# Patient Record
Sex: FEMALE | Race: BLACK OR AFRICAN AMERICAN | NOT HISPANIC OR LATINO | URBAN - METROPOLITAN AREA
[De-identification: names, ages, dates, MRNs, and addresses within clinical notes are randomized per-mention and may not be internally consistent; named-entity substitution may affect disease eponyms.]

---

## 2017-05-04 ENCOUNTER — INPATIENT (INPATIENT)
Facility: HOSPITAL | Age: 77
LOS: 1 days | Discharge: HOME | End: 2017-05-06
Attending: INTERNAL MEDICINE

## 2017-05-04 DIAGNOSIS — Z98.890 OTHER SPECIFIED POSTPROCEDURAL STATES: Chronic | ICD-10-CM

## 2017-06-28 DIAGNOSIS — E78.5 HYPERLIPIDEMIA, UNSPECIFIED: ICD-10-CM

## 2017-06-28 DIAGNOSIS — R07.89 OTHER CHEST PAIN: ICD-10-CM

## 2017-06-28 DIAGNOSIS — F32.9 MAJOR DEPRESSIVE DISORDER, SINGLE EPISODE, UNSPECIFIED: ICD-10-CM

## 2017-06-28 DIAGNOSIS — I10 ESSENTIAL (PRIMARY) HYPERTENSION: ICD-10-CM

## 2017-06-28 DIAGNOSIS — D64.9 ANEMIA, UNSPECIFIED: ICD-10-CM

## 2017-06-28 DIAGNOSIS — G47.00 INSOMNIA, UNSPECIFIED: ICD-10-CM

## 2017-06-28 DIAGNOSIS — I25.10 ATHEROSCLEROTIC HEART DISEASE OF NATIVE CORONARY ARTERY WITHOUT ANGINA PECTORIS: ICD-10-CM

## 2017-06-28 DIAGNOSIS — M17.0 BILATERAL PRIMARY OSTEOARTHRITIS OF KNEE: ICD-10-CM

## 2017-06-28 DIAGNOSIS — K21.9 GASTRO-ESOPHAGEAL REFLUX DISEASE WITHOUT ESOPHAGITIS: ICD-10-CM

## 2017-06-28 DIAGNOSIS — Z87.891 PERSONAL HISTORY OF NICOTINE DEPENDENCE: ICD-10-CM

## 2017-06-28 DIAGNOSIS — Z95.5 PRESENCE OF CORONARY ANGIOPLASTY IMPLANT AND GRAFT: ICD-10-CM

## 2017-06-28 DIAGNOSIS — R07.9 CHEST PAIN, UNSPECIFIED: ICD-10-CM

## 2017-06-28 DIAGNOSIS — M19.90 UNSPECIFIED OSTEOARTHRITIS, UNSPECIFIED SITE: ICD-10-CM

## 2018-08-27 PROBLEM — Z00.00 ENCOUNTER FOR PREVENTIVE HEALTH EXAMINATION: Status: ACTIVE | Noted: 2018-08-27

## 2018-10-19 ENCOUNTER — APPOINTMENT (OUTPATIENT)
Dept: PULMONOLOGY | Facility: CLINIC | Age: 78
End: 2018-10-19

## 2019-01-10 ENCOUNTER — RECORD ABSTRACTING (OUTPATIENT)
Age: 79
End: 2019-01-10

## 2019-01-10 DIAGNOSIS — M25.561 PAIN IN RIGHT KNEE: ICD-10-CM

## 2019-01-10 DIAGNOSIS — Z87.898 PERSONAL HISTORY OF OTHER SPECIFIED CONDITIONS: ICD-10-CM

## 2019-01-10 DIAGNOSIS — M25.476 EFFUSION, UNSPECIFIED ANKLE: ICD-10-CM

## 2019-01-10 DIAGNOSIS — Z82.61 FAMILY HISTORY OF ARTHRITIS: ICD-10-CM

## 2019-01-10 DIAGNOSIS — M25.562 PAIN IN RIGHT KNEE: ICD-10-CM

## 2019-01-10 DIAGNOSIS — I25.10 ATHEROSCLEROTIC HEART DISEASE OF NATIVE CORONARY ARTERY W/OUT ANGINA PECTORIS: ICD-10-CM

## 2019-01-10 DIAGNOSIS — Z82.49 FAMILY HISTORY OF ISCHEMIC HEART DISEASE AND OTHER DISEASES OF THE CIRCULATORY SYSTEM: ICD-10-CM

## 2019-01-10 DIAGNOSIS — F17.200 NICOTINE DEPENDENCE, UNSPECIFIED, UNCOMPLICATED: ICD-10-CM

## 2019-01-10 DIAGNOSIS — R20.2 ANESTHESIA OF SKIN: ICD-10-CM

## 2019-01-10 DIAGNOSIS — M17.10 UNILATERAL PRIMARY OSTEOARTHRITIS, UNSPECIFIED KNEE: ICD-10-CM

## 2019-01-10 DIAGNOSIS — M19.90 UNSPECIFIED OSTEOARTHRITIS, UNSPECIFIED SITE: ICD-10-CM

## 2019-01-10 DIAGNOSIS — I10 ESSENTIAL (PRIMARY) HYPERTENSION: ICD-10-CM

## 2019-01-10 DIAGNOSIS — R20.0 ANESTHESIA OF SKIN: ICD-10-CM

## 2019-01-10 DIAGNOSIS — M25.473 EFFUSION, UNSPECIFIED ANKLE: ICD-10-CM

## 2019-01-10 RX ORDER — PANTOPRAZOLE 40 MG/1
TABLET, DELAYED RELEASE ORAL
Refills: 0 | Status: ACTIVE | COMMUNITY

## 2019-01-10 RX ORDER — ATORVASTATIN CALCIUM 80 MG/1
TABLET, FILM COATED ORAL
Refills: 0 | Status: ACTIVE | COMMUNITY

## 2019-01-10 RX ORDER — ASPIRIN 81 MG
81 TABLET, DELAYED RELEASE (ENTERIC COATED) ORAL
Refills: 0 | Status: ACTIVE | COMMUNITY

## 2019-03-08 ENCOUNTER — APPOINTMENT (OUTPATIENT)
Dept: ORTHOPEDIC SURGERY | Facility: CLINIC | Age: 79
End: 2019-03-08

## 2019-08-22 ENCOUNTER — INPATIENT (INPATIENT)
Facility: HOSPITAL | Age: 79
LOS: 13 days | Discharge: HOME | End: 2019-09-05
Attending: INTERNAL MEDICINE | Admitting: INTERNAL MEDICINE
Payer: MEDICARE

## 2019-08-22 VITALS
TEMPERATURE: 98 F | SYSTOLIC BLOOD PRESSURE: 178 MMHG | DIASTOLIC BLOOD PRESSURE: 89 MMHG | RESPIRATION RATE: 18 BRPM | OXYGEN SATURATION: 97 % | HEART RATE: 65 BPM | WEIGHT: 188.94 LBS

## 2019-08-22 DIAGNOSIS — Z98.890 OTHER SPECIFIED POSTPROCEDURAL STATES: Chronic | ICD-10-CM

## 2019-08-22 LAB
ALBUMIN SERPL ELPH-MCNC: 4 G/DL — SIGNIFICANT CHANGE UP (ref 3.5–5.2)
ALP SERPL-CCNC: 124 U/L — HIGH (ref 30–115)
ALT FLD-CCNC: 10 U/L — SIGNIFICANT CHANGE UP (ref 0–41)
ANION GAP SERPL CALC-SCNC: 11 MMOL/L — SIGNIFICANT CHANGE UP (ref 7–14)
AST SERPL-CCNC: 17 U/L — SIGNIFICANT CHANGE UP (ref 0–41)
BASOPHILS # BLD AUTO: 0.02 K/UL — SIGNIFICANT CHANGE UP (ref 0–0.2)
BASOPHILS NFR BLD AUTO: 0.3 % — SIGNIFICANT CHANGE UP (ref 0–1)
BILIRUB SERPL-MCNC: 0.3 MG/DL — SIGNIFICANT CHANGE UP (ref 0.2–1.2)
BUN SERPL-MCNC: 9 MG/DL — LOW (ref 10–20)
CALCIUM SERPL-MCNC: 9.4 MG/DL — SIGNIFICANT CHANGE UP (ref 8.5–10.1)
CHLORIDE SERPL-SCNC: 104 MMOL/L — SIGNIFICANT CHANGE UP (ref 98–110)
CO2 SERPL-SCNC: 27 MMOL/L — SIGNIFICANT CHANGE UP (ref 17–32)
CREAT SERPL-MCNC: 0.7 MG/DL — SIGNIFICANT CHANGE UP (ref 0.7–1.5)
EOSINOPHIL # BLD AUTO: 0.18 K/UL — SIGNIFICANT CHANGE UP (ref 0–0.7)
EOSINOPHIL NFR BLD AUTO: 2.8 % — SIGNIFICANT CHANGE UP (ref 0–8)
GLUCOSE SERPL-MCNC: 95 MG/DL — SIGNIFICANT CHANGE UP (ref 70–99)
HCT VFR BLD CALC: 36.6 % — LOW (ref 37–47)
HGB BLD-MCNC: 12.4 G/DL — SIGNIFICANT CHANGE UP (ref 12–16)
IMM GRANULOCYTES NFR BLD AUTO: 0.2 % — SIGNIFICANT CHANGE UP (ref 0.1–0.3)
LYMPHOCYTES # BLD AUTO: 1.71 K/UL — SIGNIFICANT CHANGE UP (ref 1.2–3.4)
LYMPHOCYTES # BLD AUTO: 26.7 % — SIGNIFICANT CHANGE UP (ref 20.5–51.1)
MCHC RBC-ENTMCNC: 31.4 PG — HIGH (ref 27–31)
MCHC RBC-ENTMCNC: 33.9 G/DL — SIGNIFICANT CHANGE UP (ref 32–37)
MCV RBC AUTO: 92.7 FL — SIGNIFICANT CHANGE UP (ref 81–99)
MONOCYTES # BLD AUTO: 0.6 K/UL — SIGNIFICANT CHANGE UP (ref 0.1–0.6)
MONOCYTES NFR BLD AUTO: 9.4 % — HIGH (ref 1.7–9.3)
NEUTROPHILS # BLD AUTO: 3.89 K/UL — SIGNIFICANT CHANGE UP (ref 1.4–6.5)
NEUTROPHILS NFR BLD AUTO: 60.6 % — SIGNIFICANT CHANGE UP (ref 42.2–75.2)
NRBC # BLD: 0 /100 WBCS — SIGNIFICANT CHANGE UP (ref 0–0)
PLATELET # BLD AUTO: 276 K/UL — SIGNIFICANT CHANGE UP (ref 130–400)
POTASSIUM SERPL-MCNC: 4.3 MMOL/L — SIGNIFICANT CHANGE UP (ref 3.5–5)
POTASSIUM SERPL-SCNC: 4.3 MMOL/L — SIGNIFICANT CHANGE UP (ref 3.5–5)
PROT SERPL-MCNC: 7.5 G/DL — SIGNIFICANT CHANGE UP (ref 6–8)
RBC # BLD: 3.95 M/UL — LOW (ref 4.2–5.4)
RBC # FLD: 13.3 % — SIGNIFICANT CHANGE UP (ref 11.5–14.5)
SODIUM SERPL-SCNC: 142 MMOL/L — SIGNIFICANT CHANGE UP (ref 135–146)
TROPONIN T SERPL-MCNC: <0.01 NG/ML — SIGNIFICANT CHANGE UP
WBC # BLD: 6.41 K/UL — SIGNIFICANT CHANGE UP (ref 4.8–10.8)
WBC # FLD AUTO: 6.41 K/UL — SIGNIFICANT CHANGE UP (ref 4.8–10.8)

## 2019-08-22 PROCEDURE — 70450 CT HEAD/BRAIN W/O DYE: CPT | Mod: 26

## 2019-08-22 PROCEDURE — 99285 EMERGENCY DEPT VISIT HI MDM: CPT

## 2019-08-22 PROCEDURE — 93010 ELECTROCARDIOGRAM REPORT: CPT

## 2019-08-22 RX ORDER — MECLIZINE HCL 12.5 MG
25 TABLET ORAL ONCE
Refills: 0 | Status: COMPLETED | OUTPATIENT
Start: 2019-08-22 | End: 2019-08-22

## 2019-08-22 RX ORDER — SODIUM CHLORIDE 9 MG/ML
1000 INJECTION INTRAMUSCULAR; INTRAVENOUS; SUBCUTANEOUS
Refills: 0 | Status: DISCONTINUED | OUTPATIENT
Start: 2019-08-22 | End: 2019-08-23

## 2019-08-22 RX ADMIN — Medication 25 MILLIGRAM(S): at 20:02

## 2019-08-22 NOTE — ED ADULT NURSE NOTE - OBJECTIVE STATEMENT
Pt is a 77 y/o female complaining of dizziness, intermittent headaches, and feeling "nervousness and shaky" x2 days. Pt denies pain/discomfort at this time. Pt denies recent fall/head injury. Pt denies SOB/difficulty breathing. Pt denies n/v/d.

## 2019-08-22 NOTE — ED PROVIDER NOTE - PSH
H/O hernia repair    H/O ovarian cystectomy    History of cholecystectomy    History of surgery  cardiac stents x2  History of tonsillectomy

## 2019-08-22 NOTE — ED PROVIDER NOTE - CLINICAL SUMMARY MEDICAL DECISION MAKING FREE TEXT BOX
77 yo female with dizziness and ataxia, persistently symptomatic after meclizine, CT head negative, seen by neurology, admit for stroke work up.

## 2019-08-22 NOTE — ED PROVIDER NOTE - ATTENDING CONTRIBUTION TO CARE
79 yo female PMH CAD/stent, GERD, HTN, elevated cholesterol c/o intermittent dizziness for past week, severe since this morning upon waking up.  Worse with movement and ambulation, patient describe spinning sensation and feeling off balance, mild headache.  Denies visual disturbances, neck pain, tinnitus, Cp, SOB, palpitations, focal weakness or paresthesias; no recent illness or trauma.  Well-appearing, elderly female, NAD, PERRL, cannot assess well for nystagmus, patient not cooperating well, nml work of breathing, RRR, distal pulses intact, no gross motor deficits, no past pointing , +ataxia.  Will give trail of Meclizine, check labs, ECG, CT head, reassess.

## 2019-08-22 NOTE — ED ADULT NURSE NOTE - NSIMPLEMENTINTERV_GEN_ALL_ED
Implemented All Fall Risk Interventions:  [place] to call system. Call bell, personal items and telephone within reach. Instruct patient to call for assistance. Room bathroom lighting operational. Non-slip footwear when patient is off stretcher. Physically safe environment: no spills, clutter or unnecessary equipment. Stretcher in lowest position, wheels locked, appropriate side rails in place. Provide visual cue, wrist band, yellow gown, etc. Monitor gait and stability. Monitor for mental status changes and reorient to person, place, and time. Review medications for side effects contributing to fall risk. Reinforce activity limits and safety measures with patient and family.

## 2019-08-22 NOTE — ED PROVIDER NOTE - PROGRESS NOTE DETAILS
attempted to walk patient but patient still walk with a right sided gait. feeling dizzy on ambulation. will admit for further evaluation Neurology MARY vail of consultation and will evaluate patient.

## 2019-08-22 NOTE — ED PROVIDER NOTE - NS ED ROS FT
Constitutional: no fever, chills, no recent weight loss, change in appetite or malaise  Eyes: no redness/discharge/pain/vision changes  ENT: no rhinorrhea/ear pain/sore throat  Cardiac: No chest pain, SOB or edema.  Respiratory: No cough or respiratory distress  GI: No nausea, vomiting, diarrhea or abdominal pain.  : No dysuria, frequency, urgency or hematuria  MS: no pain to back or extremities, no loss of ROM, no weakness  Neuro: see HPI  Skin: No skin rash.  Endocrine: No history of thyroid disease or diabetes.

## 2019-08-22 NOTE — ED PROVIDER NOTE - PHYSICAL EXAMINATION
CONSTITUTIONAL: Well-appearing; well-nourished; in no apparent distress.   EYES: PERRL; EOM intact.   CARDIOVASCULAR: Normal S1, S2; no murmurs, rubs, or gallops.   RESPIRATORY: Normal chest excursion with respiration; breath sounds clear and equal bilaterally; no wheezes, rhonchi, or rales.  GI/: Normal bowel sounds; non-distended; non-tender; no palpable organomegaly.   MS: No evidence of trauma or deformity. Non-tender to palpation. No scoliosis. No CVA tenderness. Normal ROM in all four extremities; non-tender to palpation; distal pulses are normal.   SKIN: Normal for age and race; warm; dry; good turgor; no apparent lesions or exudate.   NEURO/PSYCH: A & O x 4; CN II-XII grossly unremarkable. no drifting. strength equal to b/l upper and lower extremities. speaking coherently. nml finger to nose. ambulate with mild ataxic gait ( right sided)

## 2019-08-22 NOTE — ED PROVIDER NOTE - FAMILY HISTORY
Mother  Still living? Unknown  FH: Alzheimers disease, Age at diagnosis: Age Unknown  FH: hypertension, Age at diagnosis: Age Unknown

## 2019-08-22 NOTE — ED PROVIDER NOTE - OBJECTIVE STATEMENT
77 yo female hx of HTN/ CAD s/p stent/ smoker present c/o dizziness off/on x 1 week. dizziness became more constant today so she came to ED evaluation. reported she was dizzy that she couldn't get out of her bed. Dizziness is described as spinning sensation. dizziness assoc with mild headache. denies head injury and fall. Denies facial numbness/slur speech/extremities numbness and weakness. Denies Fever/chill/recent illness/coughing/chest pain/sob/abd pain/n/v/d/extremities pain/urinary sxs.

## 2019-08-22 NOTE — ED ADULT NURSE NOTE - CHPI ED NUR SYMPTOMS NEG
no fever/no nausea/no numbness/no weakness/no blurred vision/no vomiting/no loss of consciousness/no confusion/no change in level of consciousness

## 2019-08-23 DIAGNOSIS — Z98.890 OTHER SPECIFIED POSTPROCEDURAL STATES: Chronic | ICD-10-CM

## 2019-08-23 DIAGNOSIS — Z90.49 ACQUIRED ABSENCE OF OTHER SPECIFIED PARTS OF DIGESTIVE TRACT: Chronic | ICD-10-CM

## 2019-08-23 DIAGNOSIS — Z90.89 ACQUIRED ABSENCE OF OTHER ORGANS: Chronic | ICD-10-CM

## 2019-08-23 LAB
ALBUMIN SERPL ELPH-MCNC: 3.4 G/DL — LOW (ref 3.5–5.2)
ALP SERPL-CCNC: 102 U/L — SIGNIFICANT CHANGE UP (ref 30–115)
ALT FLD-CCNC: 7 U/L — SIGNIFICANT CHANGE UP (ref 0–41)
ANION GAP SERPL CALC-SCNC: 8 MMOL/L — SIGNIFICANT CHANGE UP (ref 7–14)
AST SERPL-CCNC: 13 U/L — SIGNIFICANT CHANGE UP (ref 0–41)
BASOPHILS # BLD AUTO: 0.03 K/UL — SIGNIFICANT CHANGE UP (ref 0–0.2)
BASOPHILS NFR BLD AUTO: 0.4 % — SIGNIFICANT CHANGE UP (ref 0–1)
BILIRUB SERPL-MCNC: 0.3 MG/DL — SIGNIFICANT CHANGE UP (ref 0.2–1.2)
BUN SERPL-MCNC: 10 MG/DL — SIGNIFICANT CHANGE UP (ref 10–20)
CALCIUM SERPL-MCNC: 8.6 MG/DL — SIGNIFICANT CHANGE UP (ref 8.5–10.1)
CHLORIDE SERPL-SCNC: 108 MMOL/L — SIGNIFICANT CHANGE UP (ref 98–110)
CO2 SERPL-SCNC: 27 MMOL/L — SIGNIFICANT CHANGE UP (ref 17–32)
CREAT SERPL-MCNC: 0.7 MG/DL — SIGNIFICANT CHANGE UP (ref 0.7–1.5)
EOSINOPHIL # BLD AUTO: 0.29 K/UL — SIGNIFICANT CHANGE UP (ref 0–0.7)
EOSINOPHIL NFR BLD AUTO: 4.2 % — SIGNIFICANT CHANGE UP (ref 0–8)
FOLATE SERPL-MCNC: 4.6 NG/ML — LOW
GGT SERPL-CCNC: 13 U/L — SIGNIFICANT CHANGE UP (ref 1–40)
GLUCOSE SERPL-MCNC: 93 MG/DL — SIGNIFICANT CHANGE UP (ref 70–99)
HCT VFR BLD CALC: 32.5 % — LOW (ref 37–47)
HGB BLD-MCNC: 11 G/DL — LOW (ref 12–16)
IMM GRANULOCYTES NFR BLD AUTO: 0.3 % — SIGNIFICANT CHANGE UP (ref 0.1–0.3)
LYMPHOCYTES # BLD AUTO: 2.43 K/UL — SIGNIFICANT CHANGE UP (ref 1.2–3.4)
LYMPHOCYTES # BLD AUTO: 35.2 % — SIGNIFICANT CHANGE UP (ref 20.5–51.1)
MCHC RBC-ENTMCNC: 31.3 PG — HIGH (ref 27–31)
MCHC RBC-ENTMCNC: 33.8 G/DL — SIGNIFICANT CHANGE UP (ref 32–37)
MCV RBC AUTO: 92.6 FL — SIGNIFICANT CHANGE UP (ref 81–99)
MONOCYTES # BLD AUTO: 0.77 K/UL — HIGH (ref 0.1–0.6)
MONOCYTES NFR BLD AUTO: 11.1 % — HIGH (ref 1.7–9.3)
NEUTROPHILS # BLD AUTO: 3.37 K/UL — SIGNIFICANT CHANGE UP (ref 1.4–6.5)
NEUTROPHILS NFR BLD AUTO: 48.8 % — SIGNIFICANT CHANGE UP (ref 42.2–75.2)
NRBC # BLD: 0 /100 WBCS — SIGNIFICANT CHANGE UP (ref 0–0)
PLATELET # BLD AUTO: 230 K/UL — SIGNIFICANT CHANGE UP (ref 130–400)
POTASSIUM SERPL-MCNC: 4 MMOL/L — SIGNIFICANT CHANGE UP (ref 3.5–5)
POTASSIUM SERPL-SCNC: 4 MMOL/L — SIGNIFICANT CHANGE UP (ref 3.5–5)
PROT SERPL-MCNC: 6.3 G/DL — SIGNIFICANT CHANGE UP (ref 6–8)
RBC # BLD: 3.51 M/UL — LOW (ref 4.2–5.4)
RBC # FLD: 13.3 % — SIGNIFICANT CHANGE UP (ref 11.5–14.5)
SODIUM SERPL-SCNC: 143 MMOL/L — SIGNIFICANT CHANGE UP (ref 135–146)
TSH SERPL-MCNC: 0.01 UIU/ML — LOW (ref 0.27–4.2)
VIT B12 SERPL-MCNC: 429 PG/ML — SIGNIFICANT CHANGE UP (ref 232–1245)
WBC # BLD: 6.91 K/UL — SIGNIFICANT CHANGE UP (ref 4.8–10.8)
WBC # FLD AUTO: 6.91 K/UL — SIGNIFICANT CHANGE UP (ref 4.8–10.8)

## 2019-08-23 PROCEDURE — 70548 MR ANGIOGRAPHY NECK W/DYE: CPT | Mod: 26

## 2019-08-23 PROCEDURE — 70544 MR ANGIOGRAPHY HEAD W/O DYE: CPT | Mod: 26,59

## 2019-08-23 PROCEDURE — 70551 MRI BRAIN STEM W/O DYE: CPT | Mod: 26

## 2019-08-23 PROCEDURE — 99223 1ST HOSP IP/OBS HIGH 75: CPT | Mod: AI

## 2019-08-23 PROCEDURE — 99222 1ST HOSP IP/OBS MODERATE 55: CPT

## 2019-08-23 RX ORDER — ACETAMINOPHEN 500 MG
650 TABLET ORAL EVERY 6 HOURS
Refills: 0 | Status: DISCONTINUED | OUTPATIENT
Start: 2019-08-23 | End: 2019-08-29

## 2019-08-23 RX ORDER — MECLIZINE HCL 12.5 MG
25 TABLET ORAL EVERY 8 HOURS
Refills: 0 | Status: DISCONTINUED | OUTPATIENT
Start: 2019-08-23 | End: 2019-08-29

## 2019-08-23 RX ORDER — MECLIZINE HCL 12.5 MG
12.5 TABLET ORAL THREE TIMES A DAY
Refills: 0 | Status: DISCONTINUED | OUTPATIENT
Start: 2019-08-23 | End: 2019-08-23

## 2019-08-23 RX ORDER — LISINOPRIL 2.5 MG/1
20 TABLET ORAL DAILY
Refills: 0 | Status: DISCONTINUED | OUTPATIENT
Start: 2019-08-23 | End: 2019-08-29

## 2019-08-23 RX ORDER — METOPROLOL TARTRATE 50 MG
25 TABLET ORAL
Refills: 0 | Status: DISCONTINUED | OUTPATIENT
Start: 2019-08-23 | End: 2019-08-29

## 2019-08-23 RX ORDER — CLOPIDOGREL BISULFATE 75 MG/1
75 TABLET, FILM COATED ORAL DAILY
Refills: 0 | Status: DISCONTINUED | OUTPATIENT
Start: 2019-08-23 | End: 2019-08-29

## 2019-08-23 RX ORDER — ENOXAPARIN SODIUM 100 MG/ML
40 INJECTION SUBCUTANEOUS DAILY
Refills: 0 | Status: DISCONTINUED | OUTPATIENT
Start: 2019-08-23 | End: 2019-08-28

## 2019-08-23 RX ORDER — ASPIRIN/CALCIUM CARB/MAGNESIUM 324 MG
81 TABLET ORAL DAILY
Refills: 0 | Status: DISCONTINUED | OUTPATIENT
Start: 2019-08-23 | End: 2019-08-26

## 2019-08-23 RX ORDER — IBUPROFEN 200 MG
400 TABLET ORAL ONCE
Refills: 0 | Status: COMPLETED | OUTPATIENT
Start: 2019-08-23 | End: 2019-08-23

## 2019-08-23 RX ORDER — ATORVASTATIN CALCIUM 80 MG/1
20 TABLET, FILM COATED ORAL AT BEDTIME
Refills: 0 | Status: DISCONTINUED | OUTPATIENT
Start: 2019-08-23 | End: 2019-08-26

## 2019-08-23 RX ADMIN — Medication 325 MILLIGRAM(S): at 10:13

## 2019-08-23 RX ADMIN — Medication 12.5 MILLIGRAM(S): at 06:29

## 2019-08-23 RX ADMIN — Medication 81 MILLIGRAM(S): at 12:18

## 2019-08-23 RX ADMIN — Medication 25 MILLIGRAM(S): at 17:04

## 2019-08-23 RX ADMIN — SODIUM CHLORIDE 150 MILLILITER(S): 9 INJECTION INTRAMUSCULAR; INTRAVENOUS; SUBCUTANEOUS at 10:15

## 2019-08-23 RX ADMIN — Medication 400 MILLIGRAM(S): at 01:50

## 2019-08-23 RX ADMIN — Medication 25 MILLIGRAM(S): at 06:29

## 2019-08-23 RX ADMIN — Medication 400 MILLIGRAM(S): at 13:19

## 2019-08-23 RX ADMIN — Medication 650 MILLIGRAM(S): at 02:11

## 2019-08-23 RX ADMIN — ENOXAPARIN SODIUM 40 MILLIGRAM(S): 100 INJECTION SUBCUTANEOUS at 12:18

## 2019-08-23 RX ADMIN — Medication 25 MILLIGRAM(S): at 20:45

## 2019-08-23 RX ADMIN — LISINOPRIL 20 MILLIGRAM(S): 2.5 TABLET ORAL at 06:29

## 2019-08-23 RX ADMIN — Medication 650 MILLIGRAM(S): at 03:56

## 2019-08-23 RX ADMIN — Medication 650 MILLIGRAM(S): at 12:16

## 2019-08-23 RX ADMIN — CLOPIDOGREL BISULFATE 75 MILLIGRAM(S): 75 TABLET, FILM COATED ORAL at 12:18

## 2019-08-23 RX ADMIN — ATORVASTATIN CALCIUM 20 MILLIGRAM(S): 80 TABLET, FILM COATED ORAL at 20:46

## 2019-08-23 NOTE — H&P ADULT - NSHPSOCIALHISTORY_GEN_ALL_CORE
Lives with daughter, daughter was adopted, in good health.   passed away few years ago.  Pt herself was the only daughter, has no siblings.  Actively smokes cigarettes 7/day since age 17  does not use illicit drugs or consume Etoh

## 2019-08-23 NOTE — ED ADULT NURSE REASSESSMENT NOTE - NS ED NURSE REASSESS COMMENT FT1
MARY Finn notified unable to insert IV access, as per PA IV not necessary at this time and will dc order for IV insertion and IVF.
Pt A&Ox4, sitting up in bed with daughter at bedside. Pt denies dizziness/headache at this time. PO fluids offered. Plan of care explained. Pt admitted to tele for ataxia, waiting for bed.

## 2019-08-23 NOTE — CONSULT NOTE ADULT - ASSESSMENT
Assessment:  This is a 78y Female with h/o htn, CAD, HLD presents with 2days of now resolving dizziness and ataxic gait     Plan: Stroke work up         MRI of the Brain with MRA Head and Neck - please complete safety sheet to ensure theres no CI to procedure         if unable to obtain/tolerate MRI - please obtain CTA of head/neck         keep -160        Echo with bubble study        TSH,electrolyte psnel,hga1c, troponins       PT/OT/Speech  - Assessment:  This is a 78y Female with h/o htn, CAD, HLD presents with 2days of now resolving dizziness and ataxic gait     Plan:   - MRI of the Brain with MRA Head and Neck - please complete safety sheet to ensure theres no CI to procedure  - if unable to obtain/tolerate MRI - please obtain CTA of head/neck  - keep -160  - Echo with bubble study  - TSH,electrolyte psnel,hga1c, troponins  - PT/OT/Speech  - continue cardiac telemetry  - ibuprofen for HA  - if w/u (-), may d/c with outpt ENT followup in 2 - 4 wks  - meclizine 25 mg TID PRN  - continue secondary stroke prevention

## 2019-08-23 NOTE — H&P ADULT - NSICDXPASTSURGICALHX_GEN_ALL_CORE_FT
PAST SURGICAL HISTORY:  H/O hernia repair     H/O ovarian cystectomy     History of cholecystectomy     History of surgery cardiac stents x2    History of tonsillectomy

## 2019-08-23 NOTE — PATIENT PROFILE ADULT - PATIENT REPRESENTATIVE: ( YOU CAN CHOOSE ANY PERSON THAT CAN ASSIST YOU WITH YOUR HEALTH CARE PREFERENCES, DOES NOT HAVE TO BE A SPOUSE, IMMEDIATE FAMILY OR SIGNIFICANT OTHER/PARTNER)
Relevant Diagnosis: thyroid goiter  Teaching Topic: thyroidectomy    Person(s) involved in teaching:  Patient and friend     Teaching Concerns Addressed:  Pre op teaching included the need for an H&P, NPO status pre op, hospital routines, expected recovery, activity  restrictions, antimicrobial scrub, s/s of infection, pain control methods and the importance of follow up appointments.  The patient voiced an understanding of all instructions and will call with questions.     Motivation Level:  Asks Questions:   Yes  Eager to Learn:   Yes  Cooperative:   Yes  Receptive (willing/able to accept information):   Yes     Patient  demonstrates understanding of the following:  Reason for the appointment, diagnosis and treatment plan:   Yes  Knowledge of proper use of medications and conditions for which they are ordered (with special attention to potential side effects or drug interactions):   Yes  Which situations necessitate calling provider and whom to contact:   Yes        Proper use and care of  (medical equip, care aids, etc.):   NA  Nutritional needs and diet plan:   Yes  Pain management techniques:   Yes  Patient instructed on hand hygiene:  Yes  How and/when to access community resources:   NA     Infection Prevention:  Patient   demonstrates understanding of the following:  Surgical procedure site care taught   Signs and symptoms of infection taught Yes  Wound care taught Yes     Instructional Materials Used/Given: Pre op booklet.        declines

## 2019-08-23 NOTE — H&P ADULT - NSICDXPASTMEDICALHX_GEN_ALL_CORE_FT
PAST MEDICAL HISTORY:  CAD (coronary artery disease)     Cataract     HLD (hyperlipidemia)     HTN (hypertension)

## 2019-08-23 NOTE — OCCUPATIONAL THERAPY INITIAL EVALUATION ADULT - SPECIFY REASON(S)
attempted to see pt for OT eval however pt currently without OOB orders, contacted resident at [phone] to update orders will follow up when OOB orders updated

## 2019-08-23 NOTE — SWALLOW BEDSIDE ASSESSMENT ADULT - COMMENTS
+toleration w/o overt s/s of penetration/aspiration w/ puree +toleration w/o overt s/s of penetration/aspiration w/ soft and reg solids

## 2019-08-23 NOTE — CONSULT NOTE ADULT - SUBJECTIVE AND OBJECTIVE BOX
Neurology Consult    Patient is a 78y old  Female who presents with a chief complaint of dizziness     HPI: This is a 77 y/o F with hx of HTN, HLD, CAD s/p 2 cardiac stents currently on plavix presents to the ER with 2 days of dizziness described as the room spinning. Pt given IV fluids and meclizine-dizziness resolved however still has gait disturbance requiring assistance due to ataxic type gait. pt being admitted for ataxia. At baseline pt walks completely independant      PAST MEDICAL & SURGICAL HISTORY:  CAD (coronary artery disease)  HLD (hyperlipidemia)  HTN (hypertension)  History of surgery: cardiac stents x2      FAMILY HISTORY:      Social History: (-) x 3    Allergies    No Known Allergies    Intolerances        MEDICATIONS  (STANDING):  sodium chloride 0.9%. 1000 milliLiter(s) (150 mL/Hr) IV Continuous <Continuous>    MEDICATIONS  (PRN):      Review of systems:    Constitutional: No fever, weight loss or fatigue    Eyes: No eye pain or discharge  ENMT:  No difficulty hearing; No sinus or throat pain  Neck: No pain or stiffness  Respiratory: No cough, wheezing, chills or hemoptysis  Cardiovascular: No chest pain, palpitations, shortness of breath, dyspnea on exertion  Gastrointestinal: No abdominal pain, nausea, vomiting or hematemesis; No diarrhea or constipation.   Genitourinary: No dysuria, frequency, hematuria or incontinence  Neurological: As per HPI  Skin: No rashes or lesions   Endocrine: No heat or cold intolerance; No hair loss  Musculoskeletal: No joint pain or swelling  Psychiatric: No depression, anxiety, mood swings  Heme/Lymph: No easy bruising or bleeding gums    Vital Signs Last 24 Hrs  T(C): 36.6 (22 Aug 2019 23:15), Max: 36.8 (22 Aug 2019 18:42)  T(F): 97.8 (22 Aug 2019 23:15), Max: 98.3 (22 Aug 2019 18:42)  HR: 66 (22 Aug 2019 23:15) (65 - 66)  BP: 150/70 (22 Aug 2019 23:15) (150/70 - 178/89)  BP(mean): --  RR: 18 (22 Aug 2019 23:15) (18 - 18)  SpO2: 97% (22 Aug 2019 23:15) (97% - 97%)    Neurologic Examination:  General:  Appearance is consistent with chronologic age.  No abnormal facies.   General: The patient is oriented to person, place, time and date.    Cranial nerves: i  Motor examination:  Formal Muscle Strength Testing:   Sensory examination:   Intact to light touch and pinprick, pain, temperature and proprioception and vibration in all extremities.  Cerebellum:   FTN/HKS .  No dysmetria or dysdiadokinesia.  Gait      NIHSS  LOC:     QUES:     COMM:     VF:     GAZE:     RUE:     RLE:     LUE:     LLE:     SENS:     LANG:     SPEECH:     ATAXIA:     NEGLECT:     FACE:    NIHSS on admission:          NIHSS yesterday:          NIHSS today:      PT/OT/Speech/Rehab/other:    Current Functional status:          NIHSS:          m-RS:      Pertinent Medical History/Social History/Family History/other:     Social History: []  Drug Use: []   Alcohol Use: []   Tobacco Use:  [] Other:      Cerebrovascular Risk Factors:[] prior ischemic stroke  [] Afib  [x]CAD  [x]HTN  []xDLD  []DM []PVD      Stroke Workup:    Cardiac Rhythm(Tele/Holter) & Duration:                   Events:none    Cardiac Structure:(TTE/CHITRA +/-):      Stroke Topography:  CT Head No Cont:   EXAM:  CT BRAIN            PROCEDURE DATE:  2019            INTERPRETATION:  CLINICAL HISTORY/REASON FOR EXAM: Dizziness.    TECHNIQUE: Multiple contiguous axial CT images of the head were obtained   from the base of the skull to the vertex without administration of   intravenous contrast. Sagittal, coronal and axial reformatted images were   also obtained.    COMPARISON: None.    FINDINGS:    The ventricular, basal cisternal and sulcal patterns are appropriate for   patient's stated age    No evidence of acute intracranial hemorrhage, territorial infarct, or   significant space-occupying lesion.    Gray-white differentiation is maintained. Beam hardening artifact is   noted overlying the brain stem and posterior fossa which is inherent to   CT in this location.    There is confluent hypoattenuation in the cerebral hemispheric white   matter without mass effect consistent with chronic microvascular change.    No depressed calvarial fracture. The visualized portions of the paranasal   sinuses are unremarkable.      IMPRESSION:      No CT evidence of acute intracranial pathology.     Chronic microvascular ischemic changes.              EHSAN DONIS M.D., RESIDENT RADIOLOGIST  This document has been electronically signed.  ADAIR FERREIRA M.D., ATTENDING RADIOLOGIST  This document has been electronically signed. Aug 22 2019 10:03PM             (19 @ 21:39)        Home Medications:  aspirin 81 mg oral delayed release tablet: 1 tab(s) orally once a day (22 Aug 2019 21:37)  Lipitor:  (22 Aug 2019 21:37)  metoprolol:  (22 Aug 2019 21:37)  Plavix:  (22 Aug 2019 21:37)      MEDICATIONS  (STANDING):  sodium chloride 0.9%. 1000 milliLiter(s) (150 mL/Hr) IV Continuous <Continuous>      Last 24 hour events:none      Labs:   CBC Full  -  ( 22 Aug 2019 20:24 )  WBC Count : 6.41 K/uL  RBC Count : 3.95 M/uL  Hemoglobin : 12.4 g/dL  Hematocrit : 36.6 %  Platelet Count - Automated : 276 K/uL  Mean Cell Volume : 92.7 fL  Mean Cell Hemoglobin : 31.4 pg  Mean Cell Hemoglobin Concentration : 33.9 g/dL  Auto Neutrophil # : 3.89 K/uL  Auto Lymphocyte # : 1.71 K/uL  Auto Monocyte # : 0.60 K/uL  Auto Eosinophil # : 0.18 K/uL  Auto Basophil # : 0.02 K/uL  Auto Neutrophil % : 60.6 %  Auto Lymphocyte % : 26.7 %  Auto Monocyte % : 9.4 %  Auto Eosinophil % : 2.8 %  Auto Basophil % : 0.3 %        142  |  104  |  9<L>  ----------------------------<  95  4.3   |  27  |  0.7    Ca    9.4      22 Aug 2019 20:24    TPro  7.5  /  Alb  4.0  /  TBili  0.3  /  DBili  x   /  AST  17  /  ALT  10  /  AlkPhos  124<H>      LIVER FUNCTIONS - ( 22 Aug 2019 20:24 )  Alb: 4.0 g/dL / Pro: 7.5 g/dL / ALK PHOS: 124 U/L / ALT: 10 U/L / AST: 17 U/L / GGT: x                   CT Angiography/Perfusion:  MRI Brain NC:  MRA Head/Neck:  EE-23-19 @ 00:22 Neurology Consult    Patient is a 78y old  Female who presents with a chief complaint of dizziness     HPI: This is a 77 y/o F with hx of HTN, HLD, CAD s/p 2 cardiac stents currently on plavix presents to the ER with 2 days of dizziness described as the room spinning. Pt given IV fluids and meclizine-dizziness resolved however still has gait disturbance requiring assistance due to ataxic type gait. pt being admitted for ataxia. At baseline pt walks completely independant    PAST MEDICAL & SURGICAL HISTORY:  CAD (coronary artery disease)  HLD (hyperlipidemia)  HTN (hypertension)  History of surgery: cardiac stents x2      FAMILY HISTORY:      Social History: (-) x 3    Allergies    No Known Allergies    Intolerances        MEDICATIONS  (STANDING):  sodium chloride 0.9%. 1000 milliLiter(s) (150 mL/Hr) IV Continuous <Continuous>    MEDICATIONS  (PRN):      Review of systems:    Constitutional: No fever, weight loss or fatigue    Eyes: No eye pain or discharge  ENMT:  No difficulty hearing; No sinus or throat pain  Neck: No pain or stiffness  Respiratory: No cough, wheezing, chills or hemoptysis  Cardiovascular: No chest pain, palpitations, shortness of breath, dyspnea on exertion  Gastrointestinal: No abdominal pain, nausea, vomiting or hematemesis; No diarrhea or constipation.   Genitourinary: No dysuria, frequency, hematuria or incontinence  Neurological: As per HPI  Skin: No rashes or lesions   Endocrine: No heat or cold intolerance; No hair loss  Musculoskeletal: No joint pain or swelling  Psychiatric: No depression, anxiety, mood swings  Heme/Lymph: No easy bruising or bleeding gums    Vital Signs Last 24 Hrs  T(C): 36.6 (22 Aug 2019 23:15), Max: 36.8 (22 Aug 2019 18:42)  T(F): 97.8 (22 Aug 2019 23:15), Max: 98.3 (22 Aug 2019 18:42)  HR: 66 (22 Aug 2019 23:15) (65 - 66)  BP: 150/70 (22 Aug 2019 23:15) (150/70 - 178/89)  BP(mean): --  RR: 18 (22 Aug 2019 23:15) (18 - 18)  SpO2: 97% (22 Aug 2019 23:15) (97% - 97%)    Neurologic Examination:  General:  Appearance is consistent with chronologic age.  No abnormal facies.   General: The patient is oriented to person, place, time and date.    Cranial nerves: i  Motor examination:  Formal Muscle Strength Testing:   Sensory examination:   Intact to light touch and pinprick, pain, temperature and proprioception and vibration in all extremities.  Cerebellum:   FTN/HKS .  No dysmetria or dysdiadokinesia.  Gait      NIHSS  LOC:     QUES:     COMM:     VF:     GAZE:     RUE:     RLE:     LUE:     LLE:     SENS:     LANG:     SPEECH:     ATAXIA:     NEGLECT:     FACE:    NIHSS on admission:          NIHSS yesterday:          NIHSS today:      PT/OT/Speech/Rehab/other:    Current Functional status:          NIHSS:          m-RS:      Pertinent Medical History/Social History/Family History/other:     Social History: []  Drug Use: []   Alcohol Use: []   Tobacco Use:  [] Other:      Cerebrovascular Risk Factors:[] prior ischemic stroke  [] Afib  [x]CAD  [x]HTN  []xDLD  []DM []PVD      Stroke Workup:    Cardiac Rhythm(Tele/Holter) & Duration:                   Events:none    Cardiac Structure:(TTE/CHITRA +/-):      Stroke Topography:  CT Head No Cont:   EXAM:  CT BRAIN            PROCEDURE DATE:  2019            INTERPRETATION:  CLINICAL HISTORY/REASON FOR EXAM: Dizziness.    TECHNIQUE: Multiple contiguous axial CT images of the head were obtained   from the base of the skull to the vertex without administration of   intravenous contrast. Sagittal, coronal and axial reformatted images were   also obtained.    COMPARISON: None.    FINDINGS:    The ventricular, basal cisternal and sulcal patterns are appropriate for   patient's stated age    No evidence of acute intracranial hemorrhage, territorial infarct, or   significant space-occupying lesion.    Gray-white differentiation is maintained. Beam hardening artifact is   noted overlying the brain stem and posterior fossa which is inherent to   CT in this location.    There is confluent hypoattenuation in the cerebral hemispheric white   matter without mass effect consistent with chronic microvascular change.    No depressed calvarial fracture. The visualized portions of the paranasal   sinuses are unremarkable.      IMPRESSION:      No CT evidence of acute intracranial pathology.     Chronic microvascular ischemic changes.              EHSAN DONIS M.D., RESIDENT RADIOLOGIST  This document has been electronically signed.  DAAIR FERREIRA M.D., ATTENDING RADIOLOGIST  This document has been electronically signed. Aug 22 2019 10:03PM             (19 @ 21:39)        Home Medications:  aspirin 81 mg oral delayed release tablet: 1 tab(s) orally once a day (22 Aug 2019 21:37)  Lipitor:  (22 Aug 2019 21:37)  metoprolol:  (22 Aug 2019 21:37)  Plavix:  (22 Aug 2019 21:37)      MEDICATIONS  (STANDING):  sodium chloride 0.9%. 1000 milliLiter(s) (150 mL/Hr) IV Continuous <Continuous>      Last 24 hour events:none      Labs:   CBC Full  -  ( 22 Aug 2019 20:24 )  WBC Count : 6.41 K/uL  RBC Count : 3.95 M/uL  Hemoglobin : 12.4 g/dL  Hematocrit : 36.6 %  Platelet Count - Automated : 276 K/uL  Mean Cell Volume : 92.7 fL  Mean Cell Hemoglobin : 31.4 pg  Mean Cell Hemoglobin Concentration : 33.9 g/dL  Auto Neutrophil # : 3.89 K/uL  Auto Lymphocyte # : 1.71 K/uL  Auto Monocyte # : 0.60 K/uL  Auto Eosinophil # : 0.18 K/uL  Auto Basophil # : 0.02 K/uL  Auto Neutrophil % : 60.6 %  Auto Lymphocyte % : 26.7 %  Auto Monocyte % : 9.4 %  Auto Eosinophil % : 2.8 %  Auto Basophil % : 0.3 %        142  |  104  |  9<L>  ----------------------------<  95  4.3   |  27  |  0.7    Ca    9.4      22 Aug 2019 20:24    TPro  7.5  /  Alb  4.0  /  TBili  0.3  /  DBili  x   /  AST  17  /  ALT  10  /  AlkPhos  124<H>      LIVER FUNCTIONS - ( 22 Aug 2019 20:24 )  Alb: 4.0 g/dL / Pro: 7.5 g/dL / ALK PHOS: 124 U/L / ALT: 10 U/L / AST: 17 U/L / GGT: x                   CT Angiography/Perfusion:  MRI Brain NC:  MRA Head/Neck:  EE-23-19 @ 00:22

## 2019-08-23 NOTE — H&P ADULT - ATTENDING COMMENTS
Patient seen and examined independently. Agree with resident note/ history / physical exam and plan of care with following exceptions/additions/updates. Case discussed with house-staff, nursing and patient.   no new symptoms, her vertigo is better.   Vital Signs Last 24 Hrs  T(C): 36.1 (23 Aug 2019 09:49), Max: 36.9 (23 Aug 2019 06:30)  T(F): 96.9 (23 Aug 2019 09:49), Max: 98.5 (23 Aug 2019 06:30)  HR: 65 (23 Aug 2019 13:22) (62 - 70)  BP: 117/56 (23 Aug 2019 13:22) (100/54 - 178/89)  RR: 18 (23 Aug 2019 13:22) (18 - 18)  SpO2: 98% (23 Aug 2019 13:22) (97% - 99%)    Physical exam:   constitutional NAD, AAOX3, Respiratory  lungs CTA, CVS heart RRR, GI: abdomen Soft NT, ND, BS+, skin: intact  neuro exam non focal. no nystagmus.                           11.0   6.91  )-----------( 230      ( 23 Aug 2019 05:21 )             32.5   08-23    143  |  108  |  10  ----------------------------<  93  4.0   |  27  |  0.7    Ca    8.6      23 Aug 2019 05:21    TPro  6.3  /  Alb  3.4<L>  /  TBili  0.3  /  DBili  x   /  AST  13  /  ALT  7   /  AlkPhos  102  08-23    a/p  #vergio, poss BPV. improved. rehab eval .   PMhx: ( all stable, cont meds)  -Cataract  -CAD   -HLD   -HTN    #Progress Note Handoff  Pending (specify):  Consults_rehab_  Family discussion: anitha pt ,full code.   Disposition: Home

## 2019-08-23 NOTE — H&P ADULT - ASSESSMENT
78 years old female known to have HTN, DLD, CAD s/p 2 cardiac stents on Plavix and ASA, Cataract, s/p  cholecystectomy/tonsillectomy/hernia repair/ovarian cystectomy presented to ED for vertigo and ataxia x 1 day.    In the ED, Pt was given ivf and meclizine-dizziness improved however still has gait disturbance requiring assistance due to ataxic type gait. At baseline pt walks completely independent. Pt was sen by neurology, CTH did not show any acute pathology, MRI head/neck ordered      # Vertigo, ataxia  - No focal weakness, CTH did not show any acute pathology  - could be due to posterior circulation stroke, MRI head. MRA Head and neck ordered  - No nystagmus on examination  - Neuro fu  - Vitamin B12/folate ordered to rule out Vitamin B12 deficiency related neuropathy and ataxia  - Pt c/o tinnitus, could be BPPV vs Otolith vs Menier disease as sudden onset with headache  - cw meclizine 78 years old female known to have HTN, DLD, CAD s/p 2 cardiac stents on Plavix and ASA, Cataract, s/p  cholecystectomy/tonsillectomy/hernia repair/ovarian cystectomy presented to ED for vertigo and ataxia x 1 day.    In the ED, Pt was given ivf and meclizine-dizziness improved however still has gait disturbance requiring assistance due to ataxic type gait. At baseline pt walks completely independent. Pt was sen by neurology, CTH did not show any acute pathology, MRI head/neck ordered      # Vertigo, ataxia    - No focal weakness, CTH did not show any acute pathology  - could be due to posterior circulation stroke, MRI head. MRA Head and neck ordered  - No ear vesicles, bells palsy so [place] unlikely  - No recent URTI, labyrinthitis unlikely  - No hearing deficit, sudden vertigo with ataxia, acoustic neuroma unlikely but would follow MR head  - associated with Diffuse headache, could be migraine related, but no aura   - No nystagmus on examination, Pt actively having vertigo  - Neuro fu  - Vitamin B12/folate ordered to rule out Vitamin B12 deficiency related neuropathy and ataxia  - Pt c/o tinnitus, could be BPPV vs Otolith vs Menier disease as sudden onset with headache  - cw meclizine  - PT/OT eval  - fall precautions  - If CVA ruled out and no Vit B12 def, pt would likely need outpt ENT eval and vestibular rehab or REEG to rule out seizure  - Monitor on tele to rule out arrythmia      # HTN  - cw lisinopril, metoprolol (Pt did not remember the dose of metoprolol, started 25 BID, plz confirm with the pharmacy in the morning) Pt resting HR in 60s      # CAD s/p 2 stents  - cw ASA, statins, clopidogrel      # DLD  - cw statins      DVT PPx: Lovenox  GI PPx: not indicated  Diet: dash  Activity: fall precautions

## 2019-08-23 NOTE — SWALLOW BEDSIDE ASSESSMENT ADULT - SLP GENERAL OBSERVATIONS
Pt received in bed awake and alert on room air, speech is clear, denies any changes in speech or swallowing

## 2019-08-23 NOTE — H&P ADULT - HISTORY OF PRESENT ILLNESS
78 years old female known to have HTN, DLD, CAD s/p 2 cardiac stents on Plavix and ASA, Cataract, s/p  cholecystectomy/tonsillectomy/hernia repair/ovarian cystectomy presented to ED for vertigo and ataxia x 1 day.  As Per pt, yesterday morning when she tried getting out of the bed in the morning, she suddenly started experiencing sensation of everything spinning around. It did not resolve with rest. It lasted the whole day yesterday and pt could not ambulate due to continued vertigo. Also endorses diffuses headache of same duration, dull, constant. Pt states she has been having ringing sensation in her ears for past few years, not associated with gait instability or vertigo.  Denied Nausea, vomiting, blurring of vision, focal weakness, chest pain, palpitations, photophobia, aura, recent Abx use, recent URTI, syncopal episode, LOC etc.    In the ED, Pt was given ivf and meclizine-dizziness improved however still has gait disturbance requiring assistance due to ataxic type gait. At baseline pt walks completely independent. Pt was sen by neurology, CTH did not show any acute pathology, MRI head/neck ordered

## 2019-08-24 LAB
ANION GAP SERPL CALC-SCNC: 8 MMOL/L — SIGNIFICANT CHANGE UP (ref 7–14)
BUN SERPL-MCNC: 9 MG/DL — LOW (ref 10–20)
CALCIUM SERPL-MCNC: 8.8 MG/DL — SIGNIFICANT CHANGE UP (ref 8.5–10.1)
CHLORIDE SERPL-SCNC: 112 MMOL/L — HIGH (ref 98–110)
CHOLEST SERPL-MCNC: 126 MG/DL — SIGNIFICANT CHANGE UP (ref 100–200)
CK MB CFR SERPL CALC: <1 NG/ML — SIGNIFICANT CHANGE UP (ref 0.6–6.3)
CK SERPL-CCNC: 55 U/L — SIGNIFICANT CHANGE UP (ref 0–225)
CO2 SERPL-SCNC: 25 MMOL/L — SIGNIFICANT CHANGE UP (ref 17–32)
CREAT SERPL-MCNC: 0.7 MG/DL — SIGNIFICANT CHANGE UP (ref 0.7–1.5)
ESTIMATED AVERAGE GLUCOSE: 108 MG/DL — SIGNIFICANT CHANGE UP (ref 68–114)
GLUCOSE SERPL-MCNC: 100 MG/DL — HIGH (ref 70–99)
HBA1C BLD-MCNC: 5.4 % — SIGNIFICANT CHANGE UP (ref 4–5.6)
HDLC SERPL-MCNC: 46 MG/DL — LOW
LIPID PNL WITH DIRECT LDL SERPL: 74 MG/DL — SIGNIFICANT CHANGE UP (ref 4–129)
MAGNESIUM SERPL-MCNC: 2 MG/DL — SIGNIFICANT CHANGE UP (ref 1.8–2.4)
POTASSIUM SERPL-MCNC: 3.9 MMOL/L — SIGNIFICANT CHANGE UP (ref 3.5–5)
POTASSIUM SERPL-SCNC: 3.9 MMOL/L — SIGNIFICANT CHANGE UP (ref 3.5–5)
SODIUM SERPL-SCNC: 145 MMOL/L — SIGNIFICANT CHANGE UP (ref 135–146)
T4 AB SER-ACNC: 6.5 UG/DL — SIGNIFICANT CHANGE UP (ref 4.6–12)
TOTAL CHOLESTEROL/HDL RATIO MEASUREMENT: 2.7 RATIO — LOW (ref 4–5.5)
TRIGL SERPL-MCNC: 63 MG/DL — SIGNIFICANT CHANGE UP (ref 10–149)
TROPONIN T SERPL-MCNC: <0.01 NG/ML — SIGNIFICANT CHANGE UP

## 2019-08-24 PROCEDURE — 93306 TTE W/DOPPLER COMPLETE: CPT | Mod: 26

## 2019-08-24 RX ADMIN — Medication 25 MILLIGRAM(S): at 06:18

## 2019-08-24 RX ADMIN — ATORVASTATIN CALCIUM 20 MILLIGRAM(S): 80 TABLET, FILM COATED ORAL at 21:47

## 2019-08-24 RX ADMIN — Medication 25 MILLIGRAM(S): at 17:41

## 2019-08-24 RX ADMIN — CLOPIDOGREL BISULFATE 75 MILLIGRAM(S): 75 TABLET, FILM COATED ORAL at 15:08

## 2019-08-24 RX ADMIN — LISINOPRIL 20 MILLIGRAM(S): 2.5 TABLET ORAL at 06:18

## 2019-08-24 RX ADMIN — Medication 81 MILLIGRAM(S): at 15:08

## 2019-08-24 RX ADMIN — ENOXAPARIN SODIUM 40 MILLIGRAM(S): 100 INJECTION SUBCUTANEOUS at 15:07

## 2019-08-24 NOTE — PROGRESS NOTE ADULT - ASSESSMENT
78 years old female known to have HTN, DLD, CAD s/p 2 cardiac stents on Plavix and ASA, presents to ED for vertigo and ataxia x 1 day.  As Per pt, yesterday morning when she tried getting out of the bed in the morning, she suddenly started experiencing sensation of everything spinning around.    In the ED, Pt was given ivf and meclizine, but was still experiencing gait disturbance - which is new. CTH did not show any acute pathology, EKG NSR and she was seen by neurology and admitted for vertigo and ataxia.    # Vertigo, ataxia  -Pending MRI brain and MRA head/neck   - No focal weakness, CTH did not show any acute pathology  - No ear vesicles, bells palsy so [place] hunt unlikely  - No recent URTI, labyrinthitis unlikely  - No hearing deficit, sudden vertigo with ataxia, acoustic neuroma unlikely but would follow MR head  - associated with Diffuse headache, could be migraine related, but no aura   - No nystagmus on examination, Pt actively having vertigo  - Neuro following  - Vitamin B12/folate wnl (ordered to rule out Vitamin B12 deficiency related neuropathy and ataxia)  -TSH low   - Pt c/o tinnitus, could be BPPV vs Otolith vs Menier disease as sudden onset with headache  - cw meclizine  - PT/OT eval  - fall precautions  - If CVA ruled out and no Vit B12 def, pt would likely need outpt ENT eval and vestibular rehab or REEG to rule out seizure  - Monitor on tele to rule out arrythmia      # HTN  - cw lisinopril, metoprolol (Pt did not remember the dose of metoprolol, started 25 BID, plz confirm with the pharmacy in the morning) Pt resting HR in 60s      # CAD s/p 2 stents  - cw ASA, statins, clopidogrel      # DLD  - cw statins      DVT PPx: Lovenox  GI PPx: not indicated  Diet: dash  Activity: fall precautions 78 years old female known to have HTN, DLD, CAD s/p 2 cardiac stents on Plavix and ASA, presents to ED for vertigo and ataxia x 1 day.  As Per pt, yesterday morning when she tried getting out of the bed in the morning, she suddenly started experiencing sensation of everything spinning around.    In the ED, Pt was given ivf and meclizine, but was still experiencing gait disturbance - which is new. CTH did not show any acute pathology, EKG NSR and she was seen by neurology and admitted for vertigo and ataxia.    # Vertigo, ataxia  - cw meclizine  -MRI brain: no acute infarcts or intracranial hemorrhage  - Vitamin B12/folate wnl  - CTH neg for acute pathology, no focal weakness  -TSH low  -Pending T4,T3 lab results   - Pending MRA head/neck reading    -Pending Echo w bubble study reading  - Neuro following  - PT/OT eval  - Fall precautions  - No evidence of CVA and Vit B12 def, pt would likely need outpt ENT eval and vestibular rehab or REEG to rule out seizure  - Monitor on tele to rule out arrythmia    # HTN  - cw lisinopril, metoprolol (Pt did not remember the dose of metoprolol, started 25 BID, plz confirm with the pharmacy in the morning) Pt resting HR in 60s    # CAD s/p 2 stents  - cw ASA, statins, clopidogrel    # DLD  - cw statins    DVT PPx: Lovenox  GI PPx: not indicated  Diet: dash  Activity: fall precautions 78 years old female known to have HTN, DLD, CAD s/p 2 cardiac stents on Plavix and ASA, presents to ED for vertigo and ataxia x 1 day.  As Per pt, yesterday morning when she tried getting out of the bed in the morning, she suddenly started experiencing sensation of everything spinning around.    In the ED, Pt was given ivf and meclizine, but was still experiencing gait disturbance - which is new. CTH did not show any acute pathology, EKG NSR and she was seen by neurology and admitted for vertigo and ataxia.    # Vertigo, ataxia  - cw meclizine  -MRI brain: no acute infarcts or intracranial hemorrhage  - Vitamin B12/folate wnl  - CTH neg for acute pathology, no focal weakness  -TSH low  -Pending T4,T3 lab results - will likely need endocrine c/s  - Pending MRA head/neck reading    -Pending Echo w bubble study reading  - Neuro following  - PT/OT eval  - Fall precautions  - No evidence of CVA and Vit B12 def, pt would likely need outpt ENT eval and vestibular rehab or REEG to rule out seizure  - Monitor on tele to rule out arrythmia    # HTN  - cw lisinopril, metoprolol (Pt did not remember the dose of metoprolol, started 25 BID, plz confirm with the pharmacy in the morning) Pt resting HR in 60s    # CAD s/p 2 stents  - cw ASA, statins, clopidogrel    # DLD  - cw statins    DVT PPx: Lovenox  GI PPx: not indicated  Diet: dash  Activity: fall precautions

## 2019-08-24 NOTE — PROGRESS NOTE ADULT - SUBJECTIVE AND OBJECTIVE BOX
Patient was seen and examined. Spoke with RN. Chart reviewed.  No events overnight.  Vital Signs Last 24 Hrs  T(F): 97.5 (24 Aug 2019 05:26), Max: 98 (23 Aug 2019 16:40)  HR: 663 (24 Aug 2019 05:26) (65 - 663)  BP: 156/69 (24 Aug 2019 05:26) (117/56 - 156/69)  SpO2: 98% (23 Aug 2019 16:40) (98% - 98%)  MEDICATIONS  (STANDING):  aspirin enteric coated 81 milliGRAM(s) Oral daily  atorvastatin Oral Tab/Cap - Peds 20 milliGRAM(s) Oral at bedtime  clopidogrel Tablet 75 milliGRAM(s) Oral daily  enoxaparin Injectable 40 milliGRAM(s) SubCutaneous daily  lisinopril 20 milliGRAM(s) Oral daily  metoprolol tartrate 25 milliGRAM(s) Oral two times a day    MEDICATIONS  (PRN):  acetaminophen   Tablet .. 650 milliGRAM(s) Oral every 6 hours PRN Temp greater or equal to 38C (100.4F), Mild Pain (1 - 3)  meclizine 25 milliGRAM(s) Oral every 8 hours PRN Dizziness    Labs:                        11.0   6.91  )-----------( 230      ( 23 Aug 2019 05:21 )             32.5                         12.4   6.41  )-----------( 276      ( 22 Aug 2019 20:24 )             36.6     24 Aug 2019 06:07    145    |  112    |  9      ----------------------------<  100    3.9     |  25     |  0.7    23 Aug 2019 05:21    143    |  108    |  10     ----------------------------<  93     4.0     |  27     |  0.7      Ca    8.8        24 Aug 2019 06:07  Ca    8.6        23 Aug 2019 05:21  Mg     2.0       24 Aug 2019 06:07    TPro  6.3    /  Alb  3.4    /  TBili  0.3    /  DBili  x      /  AST  13     /  ALT  7      /  AlkPhos  102    23 Aug 2019 05:21  TPro  7.5    /  Alb  4.0    /  TBili  0.3    /  DBili  x      /  AST  17     /  ALT  10     /  AlkPhos  124    22 Aug 2019 20:24          General: comfortable, NAD  Neurology: A&Ox3, nonfocal  Head:  Normocephalic, atraumatic  ENT:  Mucosa moist, no ulcerations  Neck:  Supple, no JVD,   Skin: no breakdowns (as per RN)  Resp: CTA B/L  CV: RRR, S1S2,   GI: Soft, NT, bowel sounds  MS: No edema, + peripheral pulses, FROM all 4 extremity      A/P:  - vertigo, ataxia   - CAD   - HTN  - HLD    - MRI   - Neuro follow up  - continue meclizine   - PT   - monitor BP -160   - echo w/bubble study   - tele   - DVT prophylaxis  - Decubitus prevention- all measures as per RN protocol  - Please call or text me with any questions or updates Patient was seen and examined. Spoke with the house staff. Chart reviewed. Still reports dizziness and headache   No events overnight.  Vital Signs Last 24 Hrs  T(F): 97.5 (24 Aug 2019 05:26), Max: 98 (23 Aug 2019 16:40)  HR: 663 (24 Aug 2019 05:26) (65 - 663)  BP: 156/69 (24 Aug 2019 05:26) (117/56 - 156/69)  SpO2: 98% (23 Aug 2019 16:40) (98% - 98%)  MEDICATIONS  (STANDING):  aspirin enteric coated 81 milliGRAM(s) Oral daily  atorvastatin Oral Tab/Cap - Peds 20 milliGRAM(s) Oral at bedtime  clopidogrel Tablet 75 milliGRAM(s) Oral daily  enoxaparin Injectable 40 milliGRAM(s) SubCutaneous daily  lisinopril 20 milliGRAM(s) Oral daily  metoprolol tartrate 25 milliGRAM(s) Oral two times a day    MEDICATIONS  (PRN):  acetaminophen   Tablet .. 650 milliGRAM(s) Oral every 6 hours PRN Temp greater or equal to 38C (100.4F), Mild Pain (1 - 3)  meclizine 25 milliGRAM(s) Oral every 8 hours PRN Dizziness    Labs:                        11.0   6.91  )-----------( 230      ( 23 Aug 2019 05:21 )             32.5                         12.4   6.41  )-----------( 276      ( 22 Aug 2019 20:24 )             36.6     24 Aug 2019 06:07    145    |  112    |  9      ----------------------------<  100    3.9     |  25     |  0.7    23 Aug 2019 05:21    143    |  108    |  10     ----------------------------<  93     4.0     |  27     |  0.7      Ca    8.8        24 Aug 2019 06:07  Ca    8.6        23 Aug 2019 05:21  Mg     2.0       24 Aug 2019 06:07    TPro  6.3    /  Alb  3.4    /  TBili  0.3    /  DBili  x      /  AST  13     /  ALT  7      /  AlkPhos  102    23 Aug 2019 05:21  TPro  7.5    /  Alb  4.0    /  TBili  0.3    /  DBili  x      /  AST  17     /  ALT  10     /  AlkPhos  124    22 Aug 2019 20:24          General: comfortable, NAD  Neurology: A&Ox3, nonfocal  Head:  Normocephalic, atraumatic  ENT:  Mucosa moist, no ulcerations  Neck:  Supple, no JVD,   Skin: no breakdowns (as per RN)  Resp: CTA B/L  CV: RRR, S1S2,   GI: Soft, NT, bowel sounds  MS: No edema, + peripheral pulses, FROM all 4 extremity      A/P:  - vertigo, ataxia   - CAD   - HTN  - HLD  - hyperthyroidism     - MRI  - Neuro follow up  - endocrine eval suppressed TSH, obtain T4,T3  - continue meclizine   - PT   - monitor BP -160   - echo w/bubble study   - tele   - DVT prophylaxis  - Decubitus prevention- all measures as per RN protocol  - Please call or text me with any questions or updates  - d/w housestaff

## 2019-08-24 NOTE — PROGRESS NOTE ADULT - SUBJECTIVE AND OBJECTIVE BOX
Hospital Day:  1d    Subjective:    Patient is a 78y old  Female who presents with a chief complaint of vertigo, ataxia. No acute events overnight and in  no distress this am .       Past Medical Hx:   Cataract  CAD (coronary artery disease)  HLD (hyperlipidemia)  HTN (hypertension)    Past Sx:  H/O ovarian cystectomy  H/O hernia repair  History of tonsillectomy  History of cholecystectomy  History of surgery    Allergies:  No Known Allergies    Current Meds:   Standng Meds:  aspirin enteric coated 81 milliGRAM(s) Oral daily  atorvastatin Oral Tab/Cap - Peds 20 milliGRAM(s) Oral at bedtime  clopidogrel Tablet 75 milliGRAM(s) Oral daily  enoxaparin Injectable 40 milliGRAM(s) SubCutaneous daily  lisinopril 20 milliGRAM(s) Oral daily  metoprolol tartrate 25 milliGRAM(s) Oral two times a day    PRN Meds:  acetaminophen   Tablet .. 650 milliGRAM(s) Oral every 6 hours PRN Temp greater or equal to 38C (100.4F), Mild Pain (1 - 3)  meclizine 25 milliGRAM(s) Oral every 8 hours PRN Dizziness    HOME MEDICATIONS:  aspirin 81 mg oral delayed release tablet: 1 tab(s) orally once a day  Lipitor:   lisinopril 20 mg oral tablet: 1 tab(s) orally once a day  metoprolol:   Plavix:       Vital Signs:   T(F): 97.5 (08-24-19 @ 05:26), Max: 98 (08-23-19 @ 16:40)  HR: 663 (08-24-19 @ 05:26) (62 - 663)  BP: 156/69 (08-24-19 @ 05:26) (100/54 - 156/69)  RR: 18 (08-24-19 @ 05:26) (18 - 18)  SpO2: 98% (08-23-19 @ 16:40) (98% - 99%)        Physical Exam:   GENERAL: NAD  HEENT: NCAT  CHEST/LUNG: CTAB  HEART: Regular rate and rhythm; s1 s2 appreciated, No murmurs, rubs, or gallops  ABDOMEN: Soft, Nontender, Nondistended; Bowel sounds present  EXTREMITIES: No LE edema b/l  NERVOUS SYSTEM:  Alert & Oriented X3        Labs:                         11.0   6.91  )-----------( 230      ( 23 Aug 2019 05:21 )             32.5       24 Aug 2019 06:07    145    |  112    |  9      ----------------------------<  100    3.9     |  25     |  0.7      Ca    8.8        24 Aug 2019 06:07  Mg     2.0       24 Aug 2019 06:07    TPro  6.3    /  Alb  3.4    /  TBili  0.3    /  DBili  x      /  AST  13     /  ALT  7      /  AlkPhos  102    23 Aug 2019 05:21      Chol 126, LDL 74, HDL 46, VLDL --, TRG 63 08-24-19 @ 06:07          Troponin <0.01, CKMB <1.0, CK 55 08-24-19 @ 06:07  Troponin <0.01, CKMB --, CK -- 08-22-19 @ 20:24

## 2019-08-25 LAB
ANION GAP SERPL CALC-SCNC: 11 MMOL/L — SIGNIFICANT CHANGE UP (ref 7–14)
BUN SERPL-MCNC: 10 MG/DL — SIGNIFICANT CHANGE UP (ref 10–20)
CALCIUM SERPL-MCNC: 8.9 MG/DL — SIGNIFICANT CHANGE UP (ref 8.5–10.1)
CHLORIDE SERPL-SCNC: 109 MMOL/L — SIGNIFICANT CHANGE UP (ref 98–110)
CO2 SERPL-SCNC: 25 MMOL/L — SIGNIFICANT CHANGE UP (ref 17–32)
CREAT SERPL-MCNC: 0.7 MG/DL — SIGNIFICANT CHANGE UP (ref 0.7–1.5)
GLUCOSE SERPL-MCNC: 99 MG/DL — SIGNIFICANT CHANGE UP (ref 70–99)
HCT VFR BLD CALC: 32.5 % — LOW (ref 37–47)
HGB BLD-MCNC: 10.9 G/DL — LOW (ref 12–16)
MAGNESIUM SERPL-MCNC: 1.9 MG/DL — SIGNIFICANT CHANGE UP (ref 1.8–2.4)
MCHC RBC-ENTMCNC: 30.8 PG — SIGNIFICANT CHANGE UP (ref 27–31)
MCHC RBC-ENTMCNC: 33.5 G/DL — SIGNIFICANT CHANGE UP (ref 32–37)
MCV RBC AUTO: 91.8 FL — SIGNIFICANT CHANGE UP (ref 81–99)
NRBC # BLD: 0 /100 WBCS — SIGNIFICANT CHANGE UP (ref 0–0)
PLATELET # BLD AUTO: 235 K/UL — SIGNIFICANT CHANGE UP (ref 130–400)
POTASSIUM SERPL-MCNC: 3.8 MMOL/L — SIGNIFICANT CHANGE UP (ref 3.5–5)
POTASSIUM SERPL-SCNC: 3.8 MMOL/L — SIGNIFICANT CHANGE UP (ref 3.5–5)
RBC # BLD: 3.54 M/UL — LOW (ref 4.2–5.4)
RBC # FLD: 13.1 % — SIGNIFICANT CHANGE UP (ref 11.5–14.5)
SODIUM SERPL-SCNC: 145 MMOL/L — SIGNIFICANT CHANGE UP (ref 135–146)
WBC # BLD: 7 K/UL — SIGNIFICANT CHANGE UP (ref 4.8–10.8)
WBC # FLD AUTO: 7 K/UL — SIGNIFICANT CHANGE UP (ref 4.8–10.8)

## 2019-08-25 PROCEDURE — 99233 SBSQ HOSP IP/OBS HIGH 50: CPT

## 2019-08-25 RX ORDER — INDOMETHACIN 50 MG
25 CAPSULE ORAL
Refills: 0 | Status: DISCONTINUED | OUTPATIENT
Start: 2019-08-25 | End: 2019-08-26

## 2019-08-25 RX ADMIN — Medication 25 MILLIGRAM(S): at 19:04

## 2019-08-25 RX ADMIN — ENOXAPARIN SODIUM 40 MILLIGRAM(S): 100 INJECTION SUBCUTANEOUS at 12:43

## 2019-08-25 RX ADMIN — Medication 81 MILLIGRAM(S): at 12:43

## 2019-08-25 RX ADMIN — Medication 650 MILLIGRAM(S): at 13:02

## 2019-08-25 RX ADMIN — Medication 25 MILLIGRAM(S): at 05:26

## 2019-08-25 RX ADMIN — Medication 650 MILLIGRAM(S): at 12:59

## 2019-08-25 RX ADMIN — Medication 25 MILLIGRAM(S): at 19:03

## 2019-08-25 RX ADMIN — CLOPIDOGREL BISULFATE 75 MILLIGRAM(S): 75 TABLET, FILM COATED ORAL at 12:43

## 2019-08-25 RX ADMIN — ATORVASTATIN CALCIUM 20 MILLIGRAM(S): 80 TABLET, FILM COATED ORAL at 22:06

## 2019-08-25 RX ADMIN — Medication 25 MILLIGRAM(S): at 19:47

## 2019-08-25 RX ADMIN — LISINOPRIL 20 MILLIGRAM(S): 2.5 TABLET ORAL at 05:26

## 2019-08-25 NOTE — PROGRESS NOTE ADULT - ASSESSMENT
Assessment:  This is a 78y Female with h/o htn, CAD, HLD presents with 2days of now resolving dizziness and ataxic gait     Plan:   - no further inpt neurologic w/u  - may d/c with outpt ENT followup in 2 - 4 wks  - meclizine 25 mg TID PRN  - continue secondary stroke prevention Assessment:  This is a 78y Female with h/o htn, CAD, HLD presents w/ dizziness and headache found to have incidental L ICAS moderate stenosis with chronic lacunar infarcts in all vascular distributions.  No acute infarcts at this time.      Plan:   - meclizine 25 mg TID PRN  - continue secondary stroke prevention with plavix and lipitor 40 mg  - neurovascular evaluation for L ICAS ? symptomatic  - continue telemetry for now, will need event monitoring as outpt  - smoking cessation

## 2019-08-25 NOTE — PHYSICAL THERAPY INITIAL EVALUATION ADULT - SPECIFY REASON(S)
Pt encountered supine in bed. Informed she is being evaluated for PT services. Pt states she does not need PT services. States :"I am walking everywhere I don't need therapy". Pt informed of the

## 2019-08-25 NOTE — PROGRESS NOTE ADULT - SUBJECTIVE AND OBJECTIVE BOX
Neurology Progress Note    Interval History:      HPI:  78 years old female known to have HTN, DLD, CAD s/p 2 cardiac stents on Plavix and ASA, Cataract, s/p  cholecystectomy/tonsillectomy/hernia repair/ovarian cystectomy presented to ED for vertigo and ataxia x 1 day.  As Per pt, yesterday morning when she tried getting out of the bed in the morning, she suddenly started experiencing sensation of everything spinning around. It did not resolve with rest. It lasted the whole day yesterday and pt could not ambulate due to continued vertigo. Also endorses diffuses headache of same duration, dull, constant. Pt states she has been having ringing sensation in her ears for past few years, not associated with gait instability or vertigo.  Denied Nausea, vomiting, blurring of vision, focal weakness, chest pain, palpitations, photophobia, aura, recent Abx use, recent URTI, syncopal episode, LOC etc.    In the ED, Pt was given ivf and meclizine-dizziness improved however still has gait disturbance requiring assistance due to ataxic type gait. At baseline pt walks completely independent. Pt was sen by neurology, CTH did not show any acute pathology, MRI head/neck ordered (23 Aug 2019 03:58)      PAST MEDICAL & SURGICAL HISTORY:  Cataract  CAD (coronary artery disease)  HLD (hyperlipidemia)  HTN (hypertension)  H/O ovarian cystectomy  H/O hernia repair  History of tonsillectomy  History of cholecystectomy  History of surgery: cardiac stents x2          Medications:  acetaminophen   Tablet .. 650 milliGRAM(s) Oral every 6 hours PRN  aspirin enteric coated 81 milliGRAM(s) Oral daily  atorvastatin Oral Tab/Cap - Peds 20 milliGRAM(s) Oral at bedtime  clopidogrel Tablet 75 milliGRAM(s) Oral daily  enoxaparin Injectable 40 milliGRAM(s) SubCutaneous daily  lisinopril 20 milliGRAM(s) Oral daily  meclizine 25 milliGRAM(s) Oral every 8 hours PRN  metoprolol tartrate 25 milliGRAM(s) Oral two times a day      Vital Signs Last 24 Hrs  T(C): 35.6 (25 Aug 2019 06:15), Max: 37.2 (24 Aug 2019 21:02)  T(F): 96.1 (25 Aug 2019 06:15), Max: 98.9 (24 Aug 2019 21:02)  HR: 65 (25 Aug 2019 06:15) (65 - 70)  BP: 130/66 (25 Aug 2019 06:15) (130/66 - 155/72)  BP(mean): --  RR: 18 (25 Aug 2019 06:15) (17 - 18)  SpO2: 99% (24 Aug 2019 17:47) (99% - 99%)    Neurological Exam:   Mental status: Awake, alert and oriented x3.  Recent and remote memory intact.  Naming, repetition and comprehension intact.  Attention/concentration intact.  No dysarthria, no aphasia.  Fund of knowledge appropriate.    Cranial nerves: Pupils equally round and reactive to light, visual fields full, no nystagmus, extraocular muscles intact, V1 through V3 intact bilaterally and symmetric, face symmetric, hearing intact to finger rub, palate elevation symmetric, tongue was midline.  Motor:  MRC grading 5/5 b/l UE/LE.   strength 5/5.  Normal tone and bulk.  No abnormal movements.    Sensation: Intact to light touch, proprioception, and pinprick.   Coordination: No dysmetria on finger-to-nose and heel-to-shin.  No dysdiadokinesia.  Reflexes: 2+ in bilateral UE/LE, downgoing toes bilaterally. (-) Fuentes.  Gait: Narrow and steady. No ataxia.  Romberg negative    Labs:  CBC Full  -  ( 25 Aug 2019 06:17 )  WBC Count : 7.00 K/uL  RBC Count : 3.54 M/uL  Hemoglobin : 10.9 g/dL  Hematocrit : 32.5 %  Platelet Count - Automated : 235 K/uL  Mean Cell Volume : 91.8 fL  Mean Cell Hemoglobin : 30.8 pg  Mean Cell Hemoglobin Concentration : 33.5 g/dL    08-25    145  |  109  |  10  ----------------------------<  99  3.8   |  25  |  0.7    Ca    8.9      25 Aug 2019 06:17  Mg     1.9     08-25    < from: MR Head No Cont (08.23.19 @ 16:39) >  IMPRESSION:    1.  Periventricular and subcortical white matter chronic small vessel   ischemic changes and multiple old lacunar infarcts as described above.    2.  No acute infarcts or intracranial hemorrhage.    ANALILIA BERNSTEIN M.D., ATTENDING RADIOLOGIST  This document has been electronically signed. Aug 24 2019 10:34AM    < end of copied text >    < from: MR Angio Neck w/ IV Cont (08.23.19 @ 16:40) >  IMPRESSION:     1.  Short 0.5 cm segment of moderate (60-69%) stenosis of the proximal   left internal carotid artery.    2.  Calcified atheromatous plaque proximal right ICA with no significant   stenosis.    3.  Dominant left vertebral artery.    ANALILIA BERNSTEIN M.D., ATTENDING RADIOLOGIST  This document has been electronically signed. Aug 24 2019 11:08AM    < end of copied text >    < from: MR Angio Head No Cont (08.23.19 @ 16:39) >  IMPRESSION:     Unremarkable MRA of the brain without contrast.    ANALILIA BERNSTEIN M.D., ATTENDING RADIOLOGIST  This document has been electronically signed. Aug 24 2019 11:11AM    < end of copied text >    < from: Transthoracic Echocardiogram (08.24.19 @ 13:20) >  Summary:   1. Left ventricular ejection fraction, by visual estimation, is 60 to   65%.   2. Normal global left ventricular systolic function.   3. Sclerotic aortic valve with normal opening.   4. No evidence of patent foramen ovale.    < end of copied text > Neurology Progress Note    Interval History:  Pt stable but continues to have HA with dizziness.  No focal deficits.  smoked cigarettes since  age 17.  Denies any focal symptoms in the past.    PAST MEDICAL & SURGICAL HISTORY:  Cataract  CAD (coronary artery disease)  HLD (hyperlipidemia)  HTN (hypertension)  H/O ovarian cystectomy  H/O hernia repair  History of tonsillectomy  History of cholecystectomy  History of surgery: cardiac stents x2    Medications:  acetaminophen   Tablet .. 650 milliGRAM(s) Oral every 6 hours PRN  aspirin enteric coated 81 milliGRAM(s) Oral daily  atorvastatin Oral Tab/Cap - Peds 20 milliGRAM(s) Oral at bedtime  clopidogrel Tablet 75 milliGRAM(s) Oral daily  enoxaparin Injectable 40 milliGRAM(s) SubCutaneous daily  lisinopril 20 milliGRAM(s) Oral daily  meclizine 25 milliGRAM(s) Oral every 8 hours PRN  metoprolol tartrate 25 milliGRAM(s) Oral two times a day    Vital Signs Last 24 Hrs  T(C): 35.6 (25 Aug 2019 06:15), Max: 37.2 (24 Aug 2019 21:02)  T(F): 96.1 (25 Aug 2019 06:15), Max: 98.9 (24 Aug 2019 21:02)  HR: 65 (25 Aug 2019 06:15) (65 - 70)  BP: 130/66 (25 Aug 2019 06:15) (130/66 - 155/72)  BP(mean): --  RR: 18 (25 Aug 2019 06:15) (17 - 18)  SpO2: 99% (24 Aug 2019 17:47) (99% - 99%)    Neurological Exam:   Mental status: Awake, alert and oriented x3.  Recent and remote memory intact.  Naming, repetition and comprehension intact.  Attention/concentration intact.  No dysarthria, no aphasia.  Fund of knowledge appropriate.    Cranial nerves: Pupils equally round and reactive to light, visual fields full, no nystagmus, extraocular muscles intact, V1 through V3 intact bilaterally and symmetric, face symmetric, hearing intact to finger rub, palate elevation symmetric, tongue was midline.  Motor:  MRC grading 5/5 b/l UE/LE.   strength 5/5.  Normal tone and bulk.  No abnormal movements.    Sensation: Intact to light touch, proprioception, and pinprick.   Coordination: No dysmetria on finger-to-nose and heel-to-shin.  No dysdiadokinesia.  Reflexes: 2+ in bilateral UE/LE, downgoing toes bilaterally. (-) Fuentes.  Gait: Narrow and steady. No ataxia.  Romberg negative    Labs:  CBC Full  -  ( 25 Aug 2019 06:17 )  WBC Count : 7.00 K/uL  RBC Count : 3.54 M/uL  Hemoglobin : 10.9 g/dL  Hematocrit : 32.5 %  Platelet Count - Automated : 235 K/uL  Mean Cell Volume : 91.8 fL  Mean Cell Hemoglobin : 30.8 pg  Mean Cell Hemoglobin Concentration : 33.5 g/dL    08-25    145  |  109  |  10  ----------------------------<  99  3.8   |  25  |  0.7    Ca    8.9      25 Aug 2019 06:17  Mg     1.9     08-25    < from: MR Head No Cont (08.23.19 @ 16:39) >  IMPRESSION:    1.  Periventricular and subcortical white matter chronic small vessel   ischemic changes and multiple old lacunar infarcts as described above.    2.  No acute infarcts or intracranial hemorrhage.    ANALILIA BERNSTEIN M.D., ATTENDING RADIOLOGIST  This document has been electronically signed. Aug 24 2019 10:34AM    < end of copied text >    < from: MR Angio Neck w/ IV Cont (08.23.19 @ 16:40) >  IMPRESSION:     1.  Short 0.5 cm segment of moderate (60-69%) stenosis of the proximal   left internal carotid artery.    2.  Calcified atheromatous plaque proximal right ICA with no significant   stenosis.    3.  Dominant left vertebral artery.    ANALILIA BERNSTEIN M.D., ATTENDING RADIOLOGIST  This document has been electronically signed. Aug 24 2019 11:08AM    < end of copied text >    < from: MR Angio Head No Cont (08.23.19 @ 16:39) >  IMPRESSION:     Unremarkable MRA of the brain without contrast.    ANALILIA BERNSTEIN M.D., ATTENDING RADIOLOGIST  This document has been electronically signed. Aug 24 2019 11:11AM    < end of copied text >    < from: Transthoracic Echocardiogram (08.24.19 @ 13:20) >  Summary:   1. Left ventricular ejection fraction, by visual estimation, is 60 to   65%.   2. Normal global left ventricular systolic function.   3. Sclerotic aortic valve with normal opening.   4. No evidence of patent foramen ovale.    < end of copied text >

## 2019-08-25 NOTE — PROGRESS NOTE ADULT - SUBJECTIVE AND OBJECTIVE BOX
Patient was seen and examined. Spoke with RN. Chart reviewed.  No events overnight.  Vital Signs Last 24 Hrs  T(F): 96.1 (25 Aug 2019 06:15), Max: 98.9 (24 Aug 2019 21:02)  HR: 65 (25 Aug 2019 06:15) (65 - 70)  BP: 130/66 (25 Aug 2019 06:15) (130/66 - 155/72)  SpO2: 99% (24 Aug 2019 17:47) (99% - 99%)  MEDICATIONS  (STANDING):  aspirin enteric coated 81 milliGRAM(s) Oral daily  atorvastatin Oral Tab/Cap - Peds 20 milliGRAM(s) Oral at bedtime  clopidogrel Tablet 75 milliGRAM(s) Oral daily  enoxaparin Injectable 40 milliGRAM(s) SubCutaneous daily  lisinopril 20 milliGRAM(s) Oral daily  metoprolol tartrate 25 milliGRAM(s) Oral two times a day    MEDICATIONS  (PRN):  acetaminophen   Tablet .. 650 milliGRAM(s) Oral every 6 hours PRN Temp greater or equal to 38C (100.4F), Mild Pain (1 - 3)  meclizine 25 milliGRAM(s) Oral every 8 hours PRN Dizziness    Labs:                        10.9   7.00  )-----------( 235      ( 25 Aug 2019 06:17 )             32.5     25 Aug 2019 06:17    145    |  109    |  10     ----------------------------<  99     3.8     |  25     |  0.7    24 Aug 2019 06:07    145    |  112    |  9      ----------------------------<  100    3.9     |  25     |  0.7      Ca    8.9        25 Aug 2019 06:17  Ca    8.8        24 Aug 2019 06:07  Mg     1.9       25 Aug 2019 06:17  Mg     2.0       24 Aug 2019 06:07            General: comfortable, NAD  Neurology: A&Ox3, nonfocal  Head:  Normocephalic, atraumatic  ENT:  Mucosa moist, no ulcerations  Neck:  Supple, no JVD,   Skin: no breakdowns (as per RN)  Resp: CTA B/L  CV: RRR, S1S2,   GI: Soft, NT, bowel sounds  MS: No edema, + peripheral pulses, FROM all 4 extremity      A/P:  A/P:  - vertigo, ataxia   - CAD   - HTN  - HLD  - hyperthyroidism     - MRI  - Neuro follow up  - endocrine eval, suppressed TSH, obtain T4,T3  - continue meclizine   - PT   - monitor BP -160   - echo w/bubble study   - tele   - DVT prophylaxis  - Decubitus prevention- all measures as per RN protocol  - Please call or text me with any questions or updates  - d/w housestaff Patient was seen and examined. Spoke with RN. Chart reviewed. Patient still reports dizziness.  No events overnight.  Vital Signs Last 24 Hrs  T(F): 96.1 (25 Aug 2019 06:15), Max: 98.9 (24 Aug 2019 21:02)  HR: 65 (25 Aug 2019 06:15) (65 - 70)  BP: 130/66 (25 Aug 2019 06:15) (130/66 - 155/72)  SpO2: 99% (24 Aug 2019 17:47) (99% - 99%)  MEDICATIONS  (STANDING):  aspirin enteric coated 81 milliGRAM(s) Oral daily  atorvastatin Oral Tab/Cap - Peds 20 milliGRAM(s) Oral at bedtime  clopidogrel Tablet 75 milliGRAM(s) Oral daily  enoxaparin Injectable 40 milliGRAM(s) SubCutaneous daily  lisinopril 20 milliGRAM(s) Oral daily  metoprolol tartrate 25 milliGRAM(s) Oral two times a day    MEDICATIONS  (PRN):  acetaminophen   Tablet .. 650 milliGRAM(s) Oral every 6 hours PRN Temp greater or equal to 38C (100.4F), Mild Pain (1 - 3)  meclizine 25 milliGRAM(s) Oral every 8 hours PRN Dizziness    Labs:                        10.9   7.00  )-----------( 235      ( 25 Aug 2019 06:17 )             32.5     25 Aug 2019 06:17    145    |  109    |  10     ----------------------------<  99     3.8     |  25     |  0.7    24 Aug 2019 06:07    145    |  112    |  9      ----------------------------<  100    3.9     |  25     |  0.7      Ca    8.9        25 Aug 2019 06:17  Ca    8.8        24 Aug 2019 06:07  Mg     1.9       25 Aug 2019 06:17  Mg     2.0       24 Aug 2019 06:07            General: comfortable, NAD  Neurology: A&Ox3, nonfocal  Head:  Normocephalic, atraumatic  ENT:  Mucosa moist, no ulcerations  Neck:  Supple, no JVD,   Skin: no breakdowns (as per RN)  Resp: CTA B/L  CV: RRR, S1S2,   GI: Soft, NT, bowel sounds  MS: No edema, + peripheral pulses, FROM all 4 extremity      A/P:  A/P:  - vertigo, ataxia   - CAD   - HTN  - HLD  - hyperthyroidism   - left moderate ICA     - Neuro follow up  - endocrine eval, suppressed TSH, obtain T4,T3  - continue meclizine   - declined to take NRT, observed walking off unit to smoke   - patient requests to see Dr. Carmona her cardiologist  - PT   - monitor BP -160    - tele   - DVT prophylaxis  - Decubitus prevention- all measures as per RN protocol  - Please call or text me with any questions or updates  - d/w housestaff

## 2019-08-26 LAB
ANION GAP SERPL CALC-SCNC: 11 MMOL/L — SIGNIFICANT CHANGE UP (ref 7–14)
BASOPHILS # BLD AUTO: 0.02 K/UL — SIGNIFICANT CHANGE UP (ref 0–0.2)
BASOPHILS NFR BLD AUTO: 0.3 % — SIGNIFICANT CHANGE UP (ref 0–1)
BUN SERPL-MCNC: 10 MG/DL — SIGNIFICANT CHANGE UP (ref 10–20)
CALCIUM SERPL-MCNC: 9 MG/DL — SIGNIFICANT CHANGE UP (ref 8.5–10.1)
CHLORIDE SERPL-SCNC: 107 MMOL/L — SIGNIFICANT CHANGE UP (ref 98–110)
CO2 SERPL-SCNC: 24 MMOL/L — SIGNIFICANT CHANGE UP (ref 17–32)
CREAT SERPL-MCNC: 0.7 MG/DL — SIGNIFICANT CHANGE UP (ref 0.7–1.5)
EOSINOPHIL # BLD AUTO: 0.26 K/UL — SIGNIFICANT CHANGE UP (ref 0–0.7)
EOSINOPHIL NFR BLD AUTO: 4.3 % — SIGNIFICANT CHANGE UP (ref 0–8)
GLUCOSE SERPL-MCNC: 100 MG/DL — HIGH (ref 70–99)
HCT VFR BLD CALC: 32.8 % — LOW (ref 37–47)
HGB BLD-MCNC: 11.1 G/DL — LOW (ref 12–16)
IMM GRANULOCYTES NFR BLD AUTO: 0.2 % — SIGNIFICANT CHANGE UP (ref 0.1–0.3)
LYMPHOCYTES # BLD AUTO: 2.1 K/UL — SIGNIFICANT CHANGE UP (ref 1.2–3.4)
LYMPHOCYTES # BLD AUTO: 34.9 % — SIGNIFICANT CHANGE UP (ref 20.5–51.1)
MAGNESIUM SERPL-MCNC: 1.9 MG/DL — SIGNIFICANT CHANGE UP (ref 1.8–2.4)
MCHC RBC-ENTMCNC: 31.1 PG — HIGH (ref 27–31)
MCHC RBC-ENTMCNC: 33.8 G/DL — SIGNIFICANT CHANGE UP (ref 32–37)
MCV RBC AUTO: 91.9 FL — SIGNIFICANT CHANGE UP (ref 81–99)
MONOCYTES # BLD AUTO: 0.7 K/UL — HIGH (ref 0.1–0.6)
MONOCYTES NFR BLD AUTO: 11.6 % — HIGH (ref 1.7–9.3)
NEUTROPHILS # BLD AUTO: 2.92 K/UL — SIGNIFICANT CHANGE UP (ref 1.4–6.5)
NEUTROPHILS NFR BLD AUTO: 48.7 % — SIGNIFICANT CHANGE UP (ref 42.2–75.2)
NRBC # BLD: 0 /100 WBCS — SIGNIFICANT CHANGE UP (ref 0–0)
PLATELET # BLD AUTO: 236 K/UL — SIGNIFICANT CHANGE UP (ref 130–400)
POTASSIUM SERPL-MCNC: 3.8 MMOL/L — SIGNIFICANT CHANGE UP (ref 3.5–5)
POTASSIUM SERPL-SCNC: 3.8 MMOL/L — SIGNIFICANT CHANGE UP (ref 3.5–5)
RBC # BLD: 3.57 M/UL — LOW (ref 4.2–5.4)
RBC # FLD: 13 % — SIGNIFICANT CHANGE UP (ref 11.5–14.5)
SODIUM SERPL-SCNC: 142 MMOL/L — SIGNIFICANT CHANGE UP (ref 135–146)
WBC # BLD: 6.01 K/UL — SIGNIFICANT CHANGE UP (ref 4.8–10.8)
WBC # FLD AUTO: 6.01 K/UL — SIGNIFICANT CHANGE UP (ref 4.8–10.8)

## 2019-08-26 PROCEDURE — 93880 EXTRACRANIAL BILAT STUDY: CPT | Mod: 26

## 2019-08-26 PROCEDURE — 99222 1ST HOSP IP/OBS MODERATE 55: CPT

## 2019-08-26 RX ORDER — ASPIRIN/CALCIUM CARB/MAGNESIUM 324 MG
162 TABLET ORAL DAILY
Refills: 0 | Status: DISCONTINUED | OUTPATIENT
Start: 2019-08-26 | End: 2019-08-29

## 2019-08-26 RX ORDER — ATORVASTATIN CALCIUM 80 MG/1
40 TABLET, FILM COATED ORAL AT BEDTIME
Refills: 0 | Status: DISCONTINUED | OUTPATIENT
Start: 2019-08-26 | End: 2019-08-29

## 2019-08-26 RX ADMIN — Medication 162 MILLIGRAM(S): at 15:29

## 2019-08-26 RX ADMIN — Medication 25 MILLIGRAM(S): at 06:30

## 2019-08-26 RX ADMIN — LISINOPRIL 20 MILLIGRAM(S): 2.5 TABLET ORAL at 05:09

## 2019-08-26 RX ADMIN — ATORVASTATIN CALCIUM 40 MILLIGRAM(S): 80 TABLET, FILM COATED ORAL at 21:48

## 2019-08-26 RX ADMIN — Medication 25 MILLIGRAM(S): at 05:10

## 2019-08-26 RX ADMIN — Medication 25 MILLIGRAM(S): at 15:29

## 2019-08-26 RX ADMIN — CLOPIDOGREL BISULFATE 75 MILLIGRAM(S): 75 TABLET, FILM COATED ORAL at 12:35

## 2019-08-26 RX ADMIN — Medication 25 MILLIGRAM(S): at 05:09

## 2019-08-26 RX ADMIN — ENOXAPARIN SODIUM 40 MILLIGRAM(S): 100 INJECTION SUBCUTANEOUS at 12:35

## 2019-08-26 RX ADMIN — Medication 25 MILLIGRAM(S): at 17:53

## 2019-08-26 NOTE — PROGRESS NOTE ADULT - ASSESSMENT
78 years old female known to have HTN, DLD, CAD s/p 2 cardiac stents on Plavix and ASA, presents to ED for vertigo and ataxia x 1 day.  As Per pt, yesterday morning when she tried getting out of the bed in the morning, she suddenly started experiencing sensation of everything spinning around.    CTH did not show any acute pathology, EKG NSR and she was seen by neurology and admitted for vertigo and ataxia.    # Vertigo, ataxia  - May need ENT f/u outpatient  - Neuro consult appreciated. Continuing with meclizine 25mg TID   - Vascular consult appreciated. No intervention recommended  - Continuing secondary stroke prevention with  mg QD and Plavix   - MRI brain: no acute infarcts or intracranial hemorrhage  - Vitamin B12/folate wnl  - CTH neg for acute pathology, no focal weakness  - TSH low (0.01), T4 normal (6.5). Outpatient Endo?  - MRA neck significant for moderate stenosis (60-69%) of proximal LICA + calcified atheromatous plaque in HANANE w/o significant stenosis.  - Echo unremarkable, EF 60-65%  - Refused PT  - Fall precautions  - No evidence of CVA and Vit B12 def, pt would likely need outpt ENT eval and vestibular rehab or REEG to rule out seizure  - Discontinuing telemetry    # HTN  - c/w lisinopril 20mg QD  - c/w metoprolol 25mg BID    # CAD s/p 2 stents  - c/w Aspirin 162mg QD  - c/w Metoprolol 25mg BID  - c/w Plavix 75mg QD  - c/w Atorvastatin 20mg QD     # DLD  - c/w Atorvastatin 20mg QD    DVT PPx: Lovenox  GI PPx: not indicated  Diet: dash  Activity: fall precautions 78 years old female known to have HTN, DLD, CAD s/p 2 cardiac stents on Plavix and ASA, presents to ED for vertigo and ataxia x 1 day.  As Per pt, yesterday morning when she tried getting out of the bed in the morning, she suddenly started experiencing sensation of everything spinning around.    CTH did not show any acute pathology, EKG NSR and she was seen by neurology and admitted for vertigo and ataxia.    # Vertigo, ataxia  - May need ENT f/u outpatient  - Neuro consult appreciated. Continuing with meclizine 25mg TID and increasing Atorvastatin to 40mg QD  - Vascular consult appreciated. No intervention recommended  - Continuing secondary stroke prevention with  mg QD and Plavix   - MRI brain: no acute infarcts or intracranial hemorrhage  - Vitamin B12/folate wnl  - CTH neg for acute pathology, no focal weakness  - TSH low (0.01), T4 normal (6.5). Outpatient Endo?  - MRA neck significant for moderate stenosis (60-69%) of proximal LICA + calcified atheromatous plaque in HANA[place] w/o significant stenosis.  - Echo unremarkable, EF 60-65%  - Refused PT  - Fall precautions  - No evidence of CVA and Vit B12 def, pt would likely need outpt ENT eval and vestibular rehab or REEG to rule out seizure  - Discontinuing telemetry    # HTN  - c/w lisinopril 20mg QD  - c/w metoprolol 25mg BID    # CAD s/p 2 stents  - c/w Aspirin 162mg QD  - c/w Metoprolol 25mg BID  - c/w Plavix 75mg QD  - c/w Atorvastatin 40mg QD     # DLD  - c/w Atorvastatin 40mg QD    DVT PPx: Lovenox  GI PPx: not indicated  Diet: dash  Activity: fall precautions

## 2019-08-26 NOTE — CONSULT NOTE ADULT - SUBJECTIVE AND OBJECTIVE BOX
MARCO A LAGUNA [id number]  78y Female  3d    HPI:  78 years old female known to have HTN, DLD, CAD s/p 2 cardiac stents on Plavix and ASA, Cataract, s/p  cholecystectomy/tonsillectomy/hernia repair/ovarian cystectomy presented to ED for vertigo and ataxia x 1 day.  As Per pt, yesterday morning when she tried getting out of the bed in the morning, she suddenly started experiencing sensation of everything spinning around. It did not resolve with rest. It lasted the whole day yesterday and pt could not ambulate due to continued vertigo. Also endorses diffuses headache of same duration, dull, constant. Pt states she has been having ringing sensation in her ears for past few years, not associated with gait instability or vertigo.  Denied Nausea, vomiting, blurring of vision, focal weakness, chest pain, palpitations, photophobia, aura, recent Abx use, recent URTI, syncopal episode, LOC etc.    In the ED, Pt was given ivf and meclizine-dizziness improved however still has gait disturbance requiring assistance due to ataxic type gait. At baseline pt walks completely independent. Pt was sen by neurology, CTH did not show any acute pathology, MRI head/neck ordered (23 Aug 2019 03:58)      PAST MEDICAL & SURGICAL HISTORY:  Cataract  CAD (coronary artery disease)  HLD (hyperlipidemia)  HTN (hypertension)  H/O ovarian cystectomy  H/O hernia repair  History of tonsillectomy  History of cholecystectomy  History of surgery: cardiac stents x2        MEDICATIONS  (STANDING):  aspirin  chewable 162 milliGRAM(s) Oral daily  atorvastatin 40 milliGRAM(s) Oral at bedtime  clopidogrel Tablet 75 milliGRAM(s) Oral daily  enoxaparin Injectable 40 milliGRAM(s) SubCutaneous daily  lisinopril 20 milliGRAM(s) Oral daily  metoprolol tartrate 25 milliGRAM(s) Oral two times a day    MEDICATIONS  (PRN):  acetaminophen   Tablet .. 650 milliGRAM(s) Oral every 6 hours PRN Temp greater or equal to 38C (100.4F), Mild Pain (1 - 3)  meclizine 25 milliGRAM(s) Oral every 8 hours PRN Dizziness      Allergies    No Known Allergies    Intolerances        REVIEW OF SYSTEMS    [x] A ten-point review of systems was otherwise negative except as noted.  [ ] Due to altered mental status/intubation, subjective information were not able to be obtained from the patient. History was obtained, to the extent possible, from review of the chart and collateral sources of information.      Vital Signs Last 24 Hrs  T(C): 36.5 (26 Aug 2019 13:37), Max: 36.5 (26 Aug 2019 13:37)  T(F): 97.7 (26 Aug 2019 13:37), Max: 97.7 (26 Aug 2019 13:37)  HR: 64 (26 Aug 2019 13:37) (61 - 69)  BP: 109/54 (26 Aug 2019 13:37) (109/54 - 142/65)  BP(mean): --  RR: 18 (26 Aug 2019 13:37) (18 - 18)  SpO2: --    PHYSICAL EXAM:  GENERAL: NAD, well-appearing  CHEST/LUNG: Clear to auscultation bilaterally  HEART: Regular rate and rhythm  ABDOMEN: Soft, Nontender, Nondistended;   EXTREMITIES:  No clubbing, cyanosis, or edema  Gait: No ataxia.  Cranial nerves: CN 2-12 intact    LABS:  Labs:  CAPILLARY BLOOD GLUCOSE                              11.1   6.01  )-----------( 236      ( 26 Aug 2019 05:41 )             32.8       Auto Neutrophil %: 48.7 % (08-26-19 @ 05:41)  Auto Immature Granulocyte %: 0.2 % (08-26-19 @ 05:41)    08-26    142  |  107  |  10  ----------------------------<  100<H>  3.8   |  24  |  0.7      Calcium, Total Serum: 9.0 mg/dL (08-26-19 @ 05:41)      RADIOLOGY & ADDITIONAL STUDIES:    1.  Periventricular and subcortical white matter chronic small vessel   ischemic changes and multiple old lacunar infarcts as described above.    2.  No acute infarcts or intracranial hemorrhage.    ANALILIA BERNSTEIN M.D., ATTENDING RADIOLOGIST  This document has been electronically signed. Aug 24 2019 10:34AM    < end of copied text >    < from: MR Angio Neck w/ IV Cont (08.23.19 @ 16:40) >  IMPRESSION:     1.  Short 0.5 cm segment of moderate (60-69%) stenosis of the proximal   left internal carotid artery.    2.  Calcified atheromatous plaque proximal right ICA with no significant   stenosis.    3.  Dominant left vertebral artery.    ANALILIA BERNSTEIN M.D., ATTENDING RADIOLOGIST  This document has been electronically signed. Aug 24 2019 11:08AM    < end of copied text >    < from: MR Angio Head No Cont (08.23.19 @ 16:39) >  IMPRESSION:     Unremarkable MRA of the brain without contrast.    ANALILIA BERNSTEIN M.D., ATTENDING RADIOLOGIST  This document has been electronically signed. Aug 24 2019 11:11AM    < end of copied text >    < from: Transthoracic Echocardiogram (08.24.19 @ 13:20) >  Summary:   1. Left ventricular ejection fraction, by visual estimation, is 60 to   65%.   2. Normal global left ventricular systolic function.   3. Sclerotic aortic valve with normal opening.   4. No evidence of patent foramen ovale.    < end of copied text >

## 2019-08-26 NOTE — CONSULT NOTE ADULT - ASSESSMENT
78 years old female known to have HTN, DLD, CAD s/p 2 cardiac stents on Plavix and ASA, with vertigo and ataxia x 1 day. MR angio of neck shows 60% stenosis of the left ICA. MR head shows chronic lacunar infarcts with no acute pathologies.    Plan:  -continue ASA and Plavix  -Neurology on board, appreciate recommendations  -recommend cardiology workup to r/o cardiac etiologies 78 years old female known to have HTN, DLD, CAD s/p 2 cardiac stents on Plavix and ASA, present with symptomatic L ICA stenosis with vertigo and ataxia x 1 day. MR angio of neck shows 60% stenosis of the left ICA. MR head shows chronic lacunar infarcts with no acute pathologies. EKG shows normal sinus rhythm. Echocardiogram shows no patent foramen ovale.    Plan:  -No acute surgical intervention at the moment  -continue ASA and Plavix  -Neurology on board, appreciate recommendations  -recommend cardiology workup to r/o cardiac etiologies 78 years old female known to have HTN, DLD, CAD s/p 2 cardiac stents on Plavix and ASA, present with symptomatic L ICA stenosis with vertigo and ataxia x 1 day. MR angio of neck shows 60% stenosis of the left ICA. MR head shows chronic lacunar infarcts with no acute pathologies. EKG shows normal sinus rhythm. Echocardiogram shows no patent foramen ovale.    Plan:  -No acute surgical intervention at the moment  -continue ASA and Plavix  -Neurology on board, appreciate recommendations  -recommend cardiology workup to r/o cardiac etiologies         8/26/19  Cardiology Consultant Note:    Pt is seen history reviewed, spoke to the pt and her Daughter, I agree  above history    pt denies cp sob, heart palpitations,   past h/o cad stents noted    Cardiac exam revealed normal heart sounds no mur mur, lt carotid bruit heard, lungs clear, no motor deficit.    EKG Nsr normal ekg,     Assesment: vertigo, severe dizziness   HTN   Lt Carotid Bruit  HTN   CAD with stents     Recommendation: stable cardiac wise, cont present medical therapy, asa/plavix and her home meds

## 2019-08-26 NOTE — PROGRESS NOTE ADULT - SUBJECTIVE AND OBJECTIVE BOX
HPI  Patient is a 78y old Female who presents with a chief complaint of vertigo, ataxia (26 Aug 2019 09:42)    Currently admitted to medicine with the primary diagnosis of Ataxia     Today is hospital day 3d.     INTERVAL HPI / OVERNIGHT EVENTS:  Patient was examined and seen at bedside. This morning she is resting comfortably in bed and reports no new issues or overnight events. Still complaining of some dizziness and a headache. The patient otherwise slept well and has normal urinary/bowel habits. Patient refused PT.    ROS: Otherwise unremarkable     PAST MEDICAL & SURGICAL HISTORY  Cataract  CAD (coronary artery disease)  HLD (hyperlipidemia)  HTN (hypertension)  H/O ovarian cystectomy  H/O hernia repair  History of tonsillectomy  History of cholecystectomy  History of surgery: cardiac stents x2    ALLERGIES  No Known Allergies    MEDICATIONS  STANDING MEDICATIONS  aspirin enteric coated 81 milliGRAM(s) Oral daily  atorvastatin Oral Tab/Cap - Peds 20 milliGRAM(s) Oral at bedtime  clopidogrel Tablet 75 milliGRAM(s) Oral daily  enoxaparin Injectable 40 milliGRAM(s) SubCutaneous daily  indomethacin 25 milliGRAM(s) Oral two times a day  lisinopril 20 milliGRAM(s) Oral daily  metoprolol tartrate 25 milliGRAM(s) Oral two times a day    PRN MEDICATIONS  acetaminophen   Tablet .. 650 milliGRAM(s) Oral every 6 hours PRN  meclizine 25 milliGRAM(s) Oral every 8 hours PRN    VITALS:  T(F): 97.4  HR: 61  BP: 131/62  RR: 18  SpO2: --    PHYSICAL EXAM  GEN: NAD, Resting comfortably in bed  PULM: Clear to auscultation bilaterally, No wheezes  CVS: Regular rate and rhythm, S1-S2, no murmurs  ABD: Soft, non-tender, non-distended, no guarding  EXT: Mild LE swelling  NEURO: No focal deficits    LABS                        11.1   6.01  )-----------( 236      ( 26 Aug 2019 05:41 )             32.8     08-26    142  |  107  |  10  ----------------------------<  100<H>  3.8   |  24  |  0.7    Ca    9.0      26 Aug 2019 05:41  Mg     1.9     08-26    Troponin T, Serum (08.24.19 @ 06:07)    Troponin T, Serum: <0.01 ng/mL    Troponin T, Serum (08.22.19 @ 20:24)    Troponin T, Serum: <0.01: Hemolyzed. Interpret with caution ng/mL    Thyroid Stimulating Hormone, Serum: 0.01 uIU/mL (08.23.19 @ 05:21)    T4, Serum: 6.5 ug/dL (08.24.19 @ 06:07)        RADIOLOGY    < from: MR Angio Neck w/ IV Cont (08.23.19 @ 16:40) >  IMPRESSION:     1.  Short 0.5 cm segment of moderate (60-69%) stenosis of the proximal   left internal carotid artery.    2.  Calcified atheromatous plaque proximal right ICA with no significant   stenosis.    3.  Dominant left vertebral artery.    < end of copied text >    < from: MR Angio Head No Cont (08.23.19 @ 16:39) >  IMPRESSION:     Unremarkable MRA of the brain without contrast.    < end of copied text >    < from: Transthoracic Echocardiogram (08.24.19 @ 13:20) >  Summary:   1. Left ventricular ejection fraction, by visual estimation, is 60 to   65%.   2. Normal global left ventricular systolic function.   3. Sclerotic aortic valve with normal opening.   4. No evidence of patent foramen ovale.    < end of copied text >

## 2019-08-26 NOTE — CONSULT NOTE ADULT - SUBJECTIVE AND OBJECTIVE BOX
NEUROENDOVASCULAR CONSULT NOTE    Patient is a 78y old female who presents with a chief complaint of dizziness and headache    Consulted by Dr. Cm for neuroendovascular' s evaluation of left proximal ICA moderate stenosis     HPI: This is a 78 years old right handed  female with past medical history of HTN, DLD, CAD s/p cardiac stents x 2, active smoker who presents to ED for complaints of dizziness and headache. She reports of suddenly experiencing sensation of everything spinning around her when trying to get out of bed in the morning. The dizziness didn't resolve with rest and also had dull constant headache. Both symptoms lasted the whole day, patient was unable to ambulate. Pt states she has been having ringing sensation in her ears for past few years, but not on this admission. CT head negative for any acute intracranial pathology. But on her MR brain and MRA head/neck she is found to have incident left proximal ICA moderate stenosis and with chronic lacunar infarct. Neuroendovascular is consulted by Medicine team Dr. Cm for evaluation if her LICA stenosis is contributing to her symptoms.     PAST MEDICAL & SURGICAL HISTORY:  Cataract  CAD (coronary artery disease)  HLD (hyperlipidemia)  HTN (hypertension)  H/O ovarian cystectomy  H/O hernia repair  History of tonsillectomy  History of cholecystectomy  History of surgery: cardiac stents x2    FAMILY HISTORY:  FH: hypertension (Mother): mother  FH: Alzheimers disease (Mother): mother    SOCIAL HISTORY:  Smoking/ETOH/Drugs: + smoking; denies drugs and etoh    Allergies  No Known Allergies  Intolerances    REVIEW OF SYSTEMS  Constitutional: No fever, weight loss or fatigue  Eyes: No eye pain, visual disturbances, or discharge  ENMT:  No difficulty hearing, tinnitus, vertigo; No sinus or throat pain  Neck: No pain or stiffness  Respiratory: No cough, wheezing, chills or hemoptysis  Cardiovascular: No chest pain, palpitations, shortness of breath, dizziness or leg swelling  Gastrointestinal: No abdominal pain. No nausea, vomiting or hematemesis; No diarrhea or constipation  Genitourinary: No dysuria, frequency, hematuria or incontinence  Neurological: As per HPI  Skin: No itching, burning, rashes or lesions   Endocrine: No heat or cold intolerance; No hair loss  Musculoskeletal: No joint pain or swelling; No muscle, back or extremity pain  Psychiatric: No depression, anxiety, mood swings or difficulty sleeping  Heme/Lymph: No easy bruising or bleeding gums    PHYSICAL EXAM:    Vital Signs Last 24 Hrs  T(C): 36.3 (26 Aug 2019 05:19), Max: 36.6 (25 Aug 2019 14:47)  T(F): 97.4 (26 Aug 2019 05:19), Max: 97.8 (25 Aug 2019 14:47)  HR: 61 (26 Aug 2019 05:19) (61 - 69)  BP: 131/62 (26 Aug 2019 05:19) (131/62 - 142/65)  BP(mean): --  RR: 18 (26 Aug 2019 05:19) (18 - 18)  SpO2: --    Neurologic Exam:  Mental status: Awake, alert and oriented x3.  Recent and remote memory intact.  Naming, repetition and comprehension intact.  Attention/concentration intact.  No dysarthria, no aphasia.  Fund of knowledge appropriate.    Cranial nerves: Pupils equally round and reactive to light, visual fields full, no nystagmus, extraocular muscles intact, V1 through V3 intact bilaterally and symmetric, face symmetric, hearing intact to finger rub, palate elevation symmetric, tongue was midline, sternocleidomastoid/shoulder shrug strength bilaterally 5/5.    Motor:  Normal bulk and tone, strength 5/5 in bilateral upper and lower extremities.   strength 5/5.  Sensation: Intact to light touch.  No neglect.   Coordination: No dysmetria on finger-to-nose and heel-to-shin.  Reflexes: 2+ in upper and lower extremities, downgoing toes bilaterally  Gait: Deferred    Cardio: +S1S2 No M/R/G, No JVD  Pulm: CTA B/L no W/R/R  Skin: Warm, Dry, Capillary refill <2 seconds, good skin turgor  Abd: Soft, NTND  Ext: no c/c/e    NIH STROKE SCALE  Item	                                                        Score  1 a.	Level of Consciousness	               	0  1 b. LOC Questions	                                    0  1 c.	LOC Commands	                               	0  2.	Best Gaze	                                    0  3.	Visual	                                                0  4.	Facial Palsy	                                    0  5 a.	Motor Arm - Left	                        0  5 b.	Motor Arm - Right	                        0  6 a.	Motor Leg - Left	                         0  6 b.	Motor Leg - Right	                        0  7.	Limb Ataxia	                                    0  8.	Sensory	                                                0  9.	Language	                                    0  10.	Dysarthria	                                    0  11.	Extinction and Inattention  	        0  ______________________________________  TOTAL	                                                        0    mRS: 0 No symptoms at all    MEDICATIONS  (STANDING):  aspirin  chewable 162 milliGRAM(s) Oral daily  atorvastatin 40 milliGRAM(s) Oral at bedtime  clopidogrel Tablet 75 milliGRAM(s) Oral daily  enoxaparin Injectable 40 milliGRAM(s) SubCutaneous daily  lisinopril 20 milliGRAM(s) Oral daily  metoprolol tartrate 25 milliGRAM(s) Oral two times a day    LABS                          11.1   6.01  )-----------( 236      ( 26 Aug 2019 05:41 )             32.8     08-26    142  |  107  |  10  ----------------------------<  100<H>  3.8   |  24  |  0.7    Ca    9.0      26 Aug 2019 05:41  Mg     1.9     08-26    RADIOLOGY/EKG:    < from: CT Head No Cont (08.22.19 @ 21:39) >  IMPRESSION:  No CT evidence of acute intracranial pathology.   Chronic microvascular ischemic changes.    < from: MR Head No Cont (08.23.19 @ 16:39) >  IMPRESSION:    1.  Periventricular and subcortical white matter chronic small vessel ischemic changes and multiple old lacunar infarcts as described above.  2.  No acute infarcts or intracranial hemorrhage.    < from: MR Angio Neck w/ IV Cont (08.23.19 @ 16:40) >  IMPRESSION:   1.  Short 0.5 cm segment of moderate (60-69%) stenosis of the proximal left internal carotid artery.  2.  Calcified atheromatous plaque proximal right ICA with no significant stenosis.  3.  Dominant left vertebral artery.    < from: MR Angio Head No Cont (08.23.19 @ 16:39) >    IMPRESSION:   Unremarkable MRA of the brain without contrast.    QRS axis to [] ° and NSR at a rate of [] BPM. There was no atrial enlargement. There was no ventricular hypertrophy. There were no ST-T changes and all intervals were normal. NEUROENDOVASCULAR CONSULT NOTE    Patient is a 78y old female who presents with a chief complaint of dizziness and headache    Consulted by Dr. Cm for neuroendovascular' s evaluation of left proximal ICA moderate stenosis     HPI: This is a 78 years old right handed  woman with past medical history of HTN, DLD, CAD s/p cardiac stents x 2, active smoker who presents to ED for complaints of dizziness and headache. She reports of suddenly experiencing sensation of everything spinning around her when trying to get out of bed in the morning. The dizziness didn't resolve with rest and also had dull constant headache. Both symptoms lasted the whole day, patient was unable to ambulate. Pt states she has been having ringing sensation in her ears for past few years, but not on this admission. CT head negative for any acute intracranial pathology. But on her MR brain and MRA head/neck she is found to have incident left proximal ICA moderate stenosis and with chronic lacunar infarct. Neuroendovascular is consulted by Medicine team Dr. Cm for evaluation if her LICA stenosis is contributing to her symptoms.     PAST MEDICAL & SURGICAL HISTORY:  Cataract  CAD (coronary artery disease)  HLD (hyperlipidemia)  HTN (hypertension)  H/O ovarian cystectomy  H/O hernia repair  History of tonsillectomy  History of cholecystectomy  History of surgery: cardiac stents x2    FAMILY HISTORY:  FH: hypertension (Mother): mother  FH: Alzheimers disease (Mother): mother    SOCIAL HISTORY:  Smoking/ETOH/Drugs: + smoking; denies drugs and etoh    Allergies  No Known Allergies  Intolerances    REVIEW OF SYSTEMS  Constitutional: No fever, weight loss or fatigue  Eyes: No eye pain, visual disturbances, or discharge  ENMT:  No difficulty hearing, tinnitus, vertigo; No sinus or throat pain  Neck: No pain or stiffness  Respiratory: No cough, wheezing, chills or hemoptysis  Cardiovascular: No chest pain, palpitations, shortness of breath, dizziness or leg swelling  Gastrointestinal: No abdominal pain. No nausea, vomiting or hematemesis; No diarrhea or constipation  Genitourinary: No dysuria, frequency, hematuria or incontinence  Neurological: As per HPI  Skin: No itching, burning, rashes or lesions   Endocrine: No heat or cold intolerance; No hair loss  Musculoskeletal: No joint pain or swelling; No muscle, back or extremity pain  Psychiatric: No depression, anxiety, mood swings or difficulty sleeping  Heme/Lymph: No easy bruising or bleeding gums    PHYSICAL EXAM:    Vital Signs Last 24 Hrs  T(C): 36.3 (26 Aug 2019 05:19), Max: 36.6 (25 Aug 2019 14:47)  T(F): 97.4 (26 Aug 2019 05:19), Max: 97.8 (25 Aug 2019 14:47)  HR: 61 (26 Aug 2019 05:19) (61 - 69)  BP: 131/62 (26 Aug 2019 05:19) (131/62 - 142/65)  BP(mean): --  RR: 18 (26 Aug 2019 05:19) (18 - 18)  SpO2: --    Neurologic Exam:  Mental status: Awake, alert and oriented x3.  Recent and remote memory intact.  Naming, repetition and comprehension intact.  Attention/concentration intact.  No dysarthria, no aphasia.  Fund of knowledge appropriate.    Cranial nerves: Pupils equally round and reactive to light, visual fields full, no nystagmus, extraocular muscles intact, V1 through V3 intact bilaterally and symmetric, face symmetric, hearing intact to finger rub, palate elevation symmetric, tongue was midline, sternocleidomastoid/shoulder shrug strength bilaterally 5/5.    Motor:  Normal bulk and tone, strength 5/5 in bilateral upper and lower extremities.   strength 5/5.  Sensation: Intact to light touch.  No neglect.   Coordination: No dysmetria on finger-to-nose and heel-to-shin.  Reflexes: 2+ in upper and lower extremities, downgoing toes bilaterally  Gait: Deferred    Cardio: +S1S2 No M/R/G, No JVD  Pulm: CTA B/L no W/R/R  Skin: Warm, Dry, Capillary refill <2 seconds, good skin turgor  Abd: Soft, NTND  Ext: no c/c/e    NIH STROKE SCALE  Item	                                                        Score  1 a.	Level of Consciousness	               	0  1 b. LOC Questions	                                    0  1 c.	LOC Commands	                               	0  2.	Best Gaze	                                    0  3.	Visual	                                                0  4.	Facial Palsy	                                    0  5 a.	Motor Arm - Left	                        0  5 b.	Motor Arm - Right	                        0  6 a.	Motor Leg - Left	                         0  6 b.	Motor Leg - Right	                        0  7.	Limb Ataxia	                                    0  8.	Sensory	                                                0  9.	Language	                                    0  10.	Dysarthria	                                    0  11.	Extinction and Inattention  	        0  ______________________________________  TOTAL	                                                        0    mRS: 0 No symptoms at all    MEDICATIONS  (STANDING):  aspirin  chewable 162 milliGRAM(s) Oral daily  atorvastatin 40 milliGRAM(s) Oral at bedtime  clopidogrel Tablet 75 milliGRAM(s) Oral daily  enoxaparin Injectable 40 milliGRAM(s) SubCutaneous daily  lisinopril 20 milliGRAM(s) Oral daily  metoprolol tartrate 25 milliGRAM(s) Oral two times a day    LABS                          11.1   6.01  )-----------( 236      ( 26 Aug 2019 05:41 )             32.8     08-26    142  |  107  |  10  ----------------------------<  100<H>  3.8   |  24  |  0.7    Ca    9.0      26 Aug 2019 05:41  Mg     1.9     08-26    RADIOLOGY/EKG:    < from: CT Head No Cont (08.22.19 @ 21:39) >  IMPRESSION:  No CT evidence of acute intracranial pathology.   Chronic microvascular ischemic changes.    < from: MR Head No Cont (08.23.19 @ 16:39) >  IMPRESSION:    1.  Periventricular and subcortical white matter chronic small vessel ischemic changes and multiple old lacunar infarcts as described above.  2.  No acute infarcts or intracranial hemorrhage.    < from: MR Angio Neck w/ IV Cont (08.23.19 @ 16:40) >  IMPRESSION:   1.  Short 0.5 cm segment of moderate (60-69%) stenosis of the proximal left internal carotid artery.  2.  Calcified atheromatous plaque proximal right ICA with no significant stenosis.  3.  Dominant left vertebral artery.    < from: MR Angio Head No Cont (08.23.19 @ 16:39) >    IMPRESSION:   Unremarkable MRA of the brain without contrast.    QRS axis to [] ° and NSR at a rate of [] BPM. There was no atrial enlargement. There was no ventricular hypertrophy. There were no ST-T changes and all intervals were normal.

## 2019-08-26 NOTE — OCCUPATIONAL THERAPY INITIAL EVALUATION ADULT - GROSSLY INTACT, SENSORY
Grossly Intact/Pt states she experiences numbness in digits of b/l hands "most of the time" >1 year,  no known cause

## 2019-08-26 NOTE — OCCUPATIONAL THERAPY INITIAL EVALUATION ADULT - PERTINENT HX OF CURRENT PROBLEM, REHAB EVAL
Pt admitted 8/22/19 secondary to c/o dizziness at home. Pt states she was unable to walk and felt "the room spinning" around her.

## 2019-08-26 NOTE — CONSULT NOTE ADULT - ASSESSMENT
A 78 years old right handed  female presents with dizziness and headache. CT head negative for any acute intracranial pathology. But on her MR brain and MRA head/neck she is found to have incident left proximal ICA moderate stenosis and with chronic lacunar infarct. Neuroendovascular is consulted by Medicine team Dr. Cm for evaluation if her LICA stenosis is contributing to her symptoms. She is with cardiovascular risk factors including, HTN, DLD, CAD s/p cardiac stents x 2, active smoker.     Suggestions:  - Obtain b/l carotid duplex and recommend vascular team consult for now. Once results obtained, vascular team and neuroendovascular team to have a discussion in regards to plan of care  - Continue current management per primary team     Thank you for consult, will continue to follow.   Case discussed with attending physician.   Signed out to PGY-3 Dr. Cinthya Turner, NP  [phone] A 78 years old right handed  woman presents with dizziness and headache. CT head negative for any acute intracranial pathology. But on her MR brain and MRA head/neck she is found to have incident left proximal ICA moderate stenosis and with chronic lacunar infarct. Neuroendovascular is consulted by Medicine team Dr. Cm for evaluation if her LICA stenosis is contributing to her symptoms. She is with cardiovascular risk factors including, HTN, DLD, CAD s/p cardiac stents x 2, active smoker.     Suggestions:  - Obtain b/l carotid duplex and recommend vascular team consult for now. Once results obtained, vascular team and neuroendovascular team to have a discussion in regards to plan of care  - Continue current management per primary team     Thank you for consult, will continue to follow.   Case discussed with attending physician.   Signed out to PGY-3 Dr. Cinthya Turner, NP  [phone]

## 2019-08-26 NOTE — PROGRESS NOTE ADULT - SUBJECTIVE AND OBJECTIVE BOX
SUBJECTIVE:    Patient is a 78y old Female who presents with a chief complaint of vertigo, ataxia (26 Aug 2019 11:15)    Currently admitted to medicine with the primary diagnosis of Ataxia     Today is hospital day 3d.  feeling well    PAST MEDICAL & SURGICAL HISTORY  Cataract  CAD (coronary artery disease)  HLD (hyperlipidemia)  HTN (hypertension)  H/O ovarian cystectomy  H/O hernia repair  History of tonsillectomy  History of cholecystectomy  History of surgery: cardiac stents x2    SOCIAL HISTORY:  Negative for smoking/alcohol/drug use.     ALLERGIES:  No Known Allergies    MEDICATIONS:  STANDING MEDICATIONS  aspirin  chewable 162 milliGRAM(s) Oral daily  atorvastatin 40 milliGRAM(s) Oral at bedtime  clopidogrel Tablet 75 milliGRAM(s) Oral daily  enoxaparin Injectable 40 milliGRAM(s) SubCutaneous daily  lisinopril 20 milliGRAM(s) Oral daily  metoprolol tartrate 25 milliGRAM(s) Oral two times a day    PRN MEDICATIONS  acetaminophen   Tablet .. 650 milliGRAM(s) Oral every 6 hours PRN  meclizine 25 milliGRAM(s) Oral every 8 hours PRN    VITALS:   T(F): 97.4  HR: 61  BP: 131/62  RR: 18  SpO2: --    LABS:                        11.1   6.01  )-----------( 236      ( 26 Aug 2019 05:41 )             32.8     08-26    142  |  107  |  10  ----------------------------<  100<H>  3.8   |  24  |  0.7    Ca    9.0      26 Aug 2019 05:41  Mg     1.9     08-26                    RADIOLOGY:    PHYSICAL EXAM:  GEN: No acute distress  LUNGS: Clear to auscultation bilaterally   HEART: Regular  ABD: Soft, non-tender, non-distended.  EXT: NC/NC/NE/2+PP/PEREZ/Skin Intact.   NEURO: AAOX3    Intravenous access:   NG tube:   Gaines Catheter:

## 2019-08-26 NOTE — PROGRESS NOTE ADULT - ASSESSMENT
>>> MRA neck significant for moderate stenosis (60-69%) of proximal LICA + calcified atheromatous plaque in HANANE w/o significant stenosis.      no intervention  discharge today   d/w daughter   anticoagulant  d/w resident

## 2019-08-26 NOTE — CONSULT NOTE ADULT - SUBJECTIVE AND OBJECTIVE BOX
MARCO A LAGUNA [id number]  78y Female  3d    HPI:  78 years old female known to have HTN, DLD, CAD s/p 2 cardiac stents on Plavix and ASA, Cataract, s/p  cholecystectomy/tonsillectomy/hernia repair/ovarian cystectomy presented to ED for vertigo and ataxia x 1 day.  As Per pt, yesterday morning when she tried getting out of the bed in the morning, she suddenly started experiencing sensation of everything spinning around. It did not resolve with rest. It lasted the whole day yesterday and pt could not ambulate due to continued vertigo. Also endorses diffuses headache of same duration, dull, constant. Pt states she has been having ringing sensation in her ears for past few years, not associated with gait instability or vertigo.  Denied Nausea, vomiting, blurring of vision, focal weakness, chest pain, palpitations, photophobia, aura, recent Abx use, recent URTI, syncopal episode, LOC.    In the ED, Pt was given ivf and meclizine-dizziness improved however still has gait disturbance requiring assistance due to ataxic type gait. At baseline pt walks completely independent. Pt was sen by neurology, CTH did not show any acute pathology, MRI head/neck ordered (23 Aug 2019 03:58)    Patient was seen and examined bed site. Currently patient has no acute pathologies. Denies any dizziness currenlt      PAST MEDICAL & SURGICAL HISTORY:  Cataract  CAD (coronary artery disease)  HLD (hyperlipidemia)  HTN (hypertension)  H/O ovarian cystectomy  H/O hernia repair  History of tonsillectomy  History of cholecystectomy  History of surgery: cardiac stents x2        MEDICATIONS  (STANDING):  aspirin enteric coated 81 milliGRAM(s) Oral daily  atorvastatin Oral Tab/Cap - Peds 20 milliGRAM(s) Oral at bedtime  clopidogrel Tablet 75 milliGRAM(s) Oral daily  enoxaparin Injectable 40 milliGRAM(s) SubCutaneous daily  indomethacin 25 milliGRAM(s) Oral two times a day  lisinopril 20 milliGRAM(s) Oral daily  metoprolol tartrate 25 milliGRAM(s) Oral two times a day    MEDICATIONS  (PRN):  acetaminophen   Tablet .. 650 milliGRAM(s) Oral every 6 hours PRN Temp greater or equal to 38C (100.4F), Mild Pain (1 - 3)  meclizine 25 milliGRAM(s) Oral every 8 hours PRN Dizziness      Allergies    No Known Allergies    Intolerances        REVIEW OF SYSTEMS    [x] A ten-point review of systems was otherwise negative except as noted.  [ ] Due to altered mental status/intubation, subjective information were not able to be obtained from the patient. History was obtained, to the extent possible, from review of the chart and collateral sources of information.      Vital Signs Last 24 Hrs  T(C): 36.3 (26 Aug 2019 05:19), Max: 36.6 (25 Aug 2019 14:47)  T(F): 97.4 (26 Aug 2019 05:19), Max: 97.8 (25 Aug 2019 14:47)  HR: 61 (26 Aug 2019 05:19) (61 - 69)  BP: 131/62 (26 Aug 2019 05:19) (131/62 - 142/65)  BP(mean): --  RR: 18 (26 Aug 2019 05:19) (18 - 18)  SpO2: --    PHYSICAL EXAM:  GENERAL: NAD, well-appearing  CHEST/LUNG: Clear to auscultation bilaterally  HEART: Regular rate and rhythm  ABDOMEN: Soft, Nontender, Nondistended;   EXTREMITIES:  No clubbing, cyanosis, or edema  GAIT:  No ataxia.   EXTREMITIES:  2+ Peripheral Pulses, brisk capillary refill. No clubbing, cyanosis, or edema  NERVOUS SYSTEM:  Alert & Oriented X3, speech clear. No deficits. Cranial Nerves Exam I-VI intact  MSK: FROM all 4 extremities, full and equal strength      LABS:  Labs:  CAPILLARY BLOOD GLUCOSE                              11.1   6.01  )-----------( 236      ( 26 Aug 2019 05:41 )             32.8       Auto Neutrophil %: 48.7 % (08-26-19 @ 05:41)  Auto Immature Granulocyte %: 0.2 % (08-26-19 @ 05:41)    08-26    142  |  107  |  10  ----------------------------<  100<H>  3.8   |  24  |  0.7      Calcium, Total Serum: 9.0 mg/dL (08-26-19 @ 05:41)    RADIOLOGY & ADDITIONAL STUDIES:  < from: MR Head No Cont (08.23.19 @ 16:39) >  IMPRESSION:    1.  Periventricular and subcortical white matter chronic small vessel   ischemic changes and multiple old lacunar infarcts as described above.    2.  No acute infarcts or intracranial hemorrhage.    ANALILIA BERNSTEIN M.D., ATTENDING RADIOLOGIST  This document has been electronically signed. Aug 24 2019 10:34AM    < end of copied text >    < from: MR Angio Neck w/ IV Cont (08.23.19 @ 16:40) >  IMPRESSION:     1.  Short 0.5 cm segment of moderate (60-69%) stenosis of the proximal   left internal carotid artery.    2.  Calcified atheromatous plaque proximal right ICA with no significant   stenosis.    3.  Dominant left vertebral artery.    ANALILIA BERNSTEIN M.D., ATTENDING RADIOLOGIST  This document has been electronically signed. Aug 24 2019 11:08AM    < end of copied text >    < from: MR Angio Head No Cont (08.23.19 @ 16:39) >  IMPRESSION:     Unremarkable MRA of the brain without contrast.    ANALILIA BERNSTEIN M.D., ATTENDING RADIOLOGIST  This document has been electronically signed. Aug 24 2019 11:11AM    < end of copied text >    < from: Transthoracic Echocardiogram (08.24.19 @ 13:20) >  Summary:   1. Left ventricular ejection fraction, by visual estimation, is 60 to   65%.   2. Normal global left ventricular systolic function.   3. Sclerotic aortic valve with normal opening.   4. No evidence of patent foramen ovale.    < end of copied text > MARCO A LAGUNA [id number]  78y Female  3d    HPI:  78 years old female known to have HTN, DLD, CAD s/p 2 cardiac stents on Plavix and ASA, Cataract, s/p  cholecystectomy/tonsillectomy/hernia repair/ovarian cystectomy presented to ED for vertigo and ataxia x 1 day.  As Per pt, yesterday morning when she tried getting out of the bed in the morning, she suddenly started experiencing sensation of everything spinning around. It did not resolve with rest. It lasted the whole day yesterday and pt could not ambulate due to continued vertigo. Also endorses diffuses headache of same duration, dull, constant. Pt states she has been having ringing sensation in her ears for past few years, not associated with gait instability or vertigo.  Denied Nausea, vomiting, blurring of vision, focal weakness, chest pain, palpitations, photophobia, aura, recent Abx use, recent URTI, syncopal episode, LOC.    In the ED, Pt was given ivf and meclizine-dizziness improved however still has gait disturbance requiring assistance due to ataxic type gait. At baseline pt walks completely independent. Pt was sen by neurology, CTH did not show any acute pathology, MRI head/neck ordered (23 Aug 2019 03:58)    Patient was seen and examined bed site. Currently patient has no acute pathologies. Denies any dizziness or headache currently. No acute complaints.      PAST MEDICAL & SURGICAL HISTORY:  Cataract  CAD (coronary artery disease)  HLD (hyperlipidemia)  HTN (hypertension)  H/O ovarian cystectomy  H/O hernia repair  History of tonsillectomy  History of cholecystectomy  History of surgery: cardiac stents x2        MEDICATIONS  (STANDING):  aspirin enteric coated 81 milliGRAM(s) Oral daily  atorvastatin Oral Tab/Cap - Peds 20 milliGRAM(s) Oral at bedtime  clopidogrel Tablet 75 milliGRAM(s) Oral daily  enoxaparin Injectable 40 milliGRAM(s) SubCutaneous daily  indomethacin 25 milliGRAM(s) Oral two times a day  lisinopril 20 milliGRAM(s) Oral daily  metoprolol tartrate 25 milliGRAM(s) Oral two times a day    MEDICATIONS  (PRN):  acetaminophen   Tablet .. 650 milliGRAM(s) Oral every 6 hours PRN Temp greater or equal to 38C (100.4F), Mild Pain (1 - 3)  meclizine 25 milliGRAM(s) Oral every 8 hours PRN Dizziness      Allergies    No Known Allergies    Intolerances        REVIEW OF SYSTEMS    [x] A ten-point review of systems was otherwise negative except as noted.  [ ] Due to altered mental status/intubation, subjective information were not able to be obtained from the patient. History was obtained, to the extent possible, from review of the chart and collateral sources of information.      Vital Signs Last 24 Hrs  T(C): 36.3 (26 Aug 2019 05:19), Max: 36.6 (25 Aug 2019 14:47)  T(F): 97.4 (26 Aug 2019 05:19), Max: 97.8 (25 Aug 2019 14:47)  HR: 61 (26 Aug 2019 05:19) (61 - 69)  BP: 131/62 (26 Aug 2019 05:19) (131/62 - 142/65)  BP(mean): --  RR: 18 (26 Aug 2019 05:19) (18 - 18)  SpO2: --    PHYSICAL EXAM:  GENERAL: NAD, well-appearing  CHEST/LUNG: Clear to auscultation bilaterally  HEART: Regular rate and rhythm  ABDOMEN: Soft, Nontender, Nondistended;   EXTREMITIES:  No clubbing, cyanosis, or edema  GAIT:  No ataxia.   EXTREMITIES:  2+ Peripheral Pulses, brisk capillary refill. No clubbing, cyanosis, or edema  NERVOUS SYSTEM:  Alert & Oriented X3, speech clear. No deficits. Cranial Nerves Exam I-VI intact  MSK: FROM all 4 extremities, full and equal strength      LABS:  Labs:  CAPILLARY BLOOD GLUCOSE                              11.1   6.01  )-----------( 236      ( 26 Aug 2019 05:41 )             32.8       Auto Neutrophil %: 48.7 % (08-26-19 @ 05:41)  Auto Immature Granulocyte %: 0.2 % (08-26-19 @ 05:41)    08-26    142  |  107  |  10  ----------------------------<  100<H>  3.8   |  24  |  0.7      Calcium, Total Serum: 9.0 mg/dL (08-26-19 @ 05:41)    RADIOLOGY & ADDITIONAL STUDIES:  < from: MR Head No Cont (08.23.19 @ 16:39) >  IMPRESSION:    1.  Periventricular and subcortical white matter chronic small vessel   ischemic changes and multiple old lacunar infarcts as described above.    2.  No acute infarcts or intracranial hemorrhage.    ANALILIA BERNSTEIN M.D., ATTENDING RADIOLOGIST  This document has been electronically signed. Aug 24 2019 10:34AM    < end of copied text >    < from: MR Angio Neck w/ IV Cont (08.23.19 @ 16:40) >  IMPRESSION:     1.  Short 0.5 cm segment of moderate (60-69%) stenosis of the proximal   left internal carotid artery.    2.  Calcified atheromatous plaque proximal right ICA with no significant   stenosis.    3.  Dominant left vertebral artery.    ANALILIA BERNSTEIN M.D., ATTENDING RADIOLOGIST  This document has been electronically signed. Aug 24 2019 11:08AM    < end of copied text >    < from: MR Angio Head No Cont (08.23.19 @ 16:39) >  IMPRESSION:     Unremarkable MRA of the brain without contrast.    ANALILIA BERNSTEIN M.D., ATTENDING RADIOLOGIST  This document has been electronically signed. Aug 24 2019 11:11AM    < end of copied text >    < from: Transthoracic Echocardiogram (08.24.19 @ 13:20) >  Summary:   1. Left ventricular ejection fraction, by visual estimation, is 60 to   65%.   2. Normal global left ventricular systolic function.   3. Sclerotic aortic valve with normal opening.   4. No evidence of patent foramen ovale.    < end of copied text >

## 2019-08-26 NOTE — CONSULT NOTE ADULT - ASSESSMENT
This is a 78y Female with h/o htn, CAD, HLD presents w/ dizziness and ataxia found to have L ICA moderate stenosis (60-69%) with chronic lacunar infarcts on MRI    Plan  -Continue ASA and plavix  -Neurology consult This is a 78y Female with h/o htn, CAD, HLD presents w/ dizziness and ataxia found to have L ICA moderate stenosis (60-69%) with chronic lacunar infarcts on MRI    Plan  -Continue ASA and plavix  -Neurology Pre Op risk stratification   - Cardiology Pre Op risk stratification   -f/u duplex  - possible OR planning

## 2019-08-26 NOTE — OCCUPATIONAL THERAPY INITIAL EVALUATION ADULT - PATIENT PROFILE REVIEW, REHAB EVAL
yes/Pt's chart thoroughly reviewed. Pt seen for OT IE 10:50-11am, 11:10-11:30. OT IE interrupted 2* cardiologist visit.

## 2019-08-27 LAB
ANION GAP SERPL CALC-SCNC: 11 MMOL/L — SIGNIFICANT CHANGE UP (ref 7–14)
BLD GP AB SCN SERPL QL: SIGNIFICANT CHANGE UP
BUN SERPL-MCNC: 10 MG/DL — SIGNIFICANT CHANGE UP (ref 10–20)
CALCIUM SERPL-MCNC: 8.8 MG/DL — SIGNIFICANT CHANGE UP (ref 8.5–10.1)
CHLORIDE SERPL-SCNC: 108 MMOL/L — SIGNIFICANT CHANGE UP (ref 98–110)
CO2 SERPL-SCNC: 25 MMOL/L — SIGNIFICANT CHANGE UP (ref 17–32)
CREAT SERPL-MCNC: 0.7 MG/DL — SIGNIFICANT CHANGE UP (ref 0.7–1.5)
GLUCOSE SERPL-MCNC: 101 MG/DL — HIGH (ref 70–99)
HCT VFR BLD CALC: 33.6 % — LOW (ref 37–47)
HGB BLD-MCNC: 11.2 G/DL — LOW (ref 12–16)
MAGNESIUM SERPL-MCNC: 2 MG/DL — SIGNIFICANT CHANGE UP (ref 1.8–2.4)
MCHC RBC-ENTMCNC: 30.6 PG — SIGNIFICANT CHANGE UP (ref 27–31)
MCHC RBC-ENTMCNC: 33.3 G/DL — SIGNIFICANT CHANGE UP (ref 32–37)
MCV RBC AUTO: 91.8 FL — SIGNIFICANT CHANGE UP (ref 81–99)
NRBC # BLD: 0 /100 WBCS — SIGNIFICANT CHANGE UP (ref 0–0)
PLATELET # BLD AUTO: 221 K/UL — SIGNIFICANT CHANGE UP (ref 130–400)
POTASSIUM SERPL-MCNC: 4 MMOL/L — SIGNIFICANT CHANGE UP (ref 3.5–5)
POTASSIUM SERPL-SCNC: 4 MMOL/L — SIGNIFICANT CHANGE UP (ref 3.5–5)
RBC # BLD: 3.66 M/UL — LOW (ref 4.2–5.4)
RBC # FLD: 13.2 % — SIGNIFICANT CHANGE UP (ref 11.5–14.5)
SODIUM SERPL-SCNC: 144 MMOL/L — SIGNIFICANT CHANGE UP (ref 135–146)
WBC # BLD: 6.99 K/UL — SIGNIFICANT CHANGE UP (ref 4.8–10.8)
WBC # FLD AUTO: 6.99 K/UL — SIGNIFICANT CHANGE UP (ref 4.8–10.8)

## 2019-08-27 PROCEDURE — 99233 SBSQ HOSP IP/OBS HIGH 50: CPT

## 2019-08-27 RX ADMIN — ENOXAPARIN SODIUM 40 MILLIGRAM(S): 100 INJECTION SUBCUTANEOUS at 11:39

## 2019-08-27 RX ADMIN — Medication 162 MILLIGRAM(S): at 11:39

## 2019-08-27 RX ADMIN — ATORVASTATIN CALCIUM 40 MILLIGRAM(S): 80 TABLET, FILM COATED ORAL at 21:41

## 2019-08-27 RX ADMIN — LISINOPRIL 20 MILLIGRAM(S): 2.5 TABLET ORAL at 05:55

## 2019-08-27 RX ADMIN — Medication 25 MILLIGRAM(S): at 05:55

## 2019-08-27 RX ADMIN — CLOPIDOGREL BISULFATE 75 MILLIGRAM(S): 75 TABLET, FILM COATED ORAL at 11:39

## 2019-08-27 RX ADMIN — Medication 25 MILLIGRAM(S): at 12:47

## 2019-08-27 RX ADMIN — Medication 25 MILLIGRAM(S): at 18:10

## 2019-08-27 NOTE — PROGRESS NOTE ADULT - ASSESSMENT
A 78 years old right handed  woman presents with dizziness and headache. CT head negative for any acute intracranial pathology. But on her MR brain and MRA head/neck she is found to have incident left proximal ICA moderate stenosis and with chronic lacunar infarct. Neuroendovascular is consulted by Medicine team Dr. Cm for evaluation if her LICA stenosis is contributing to her symptoms. She is with cardiovascular risk factors including, HTN, DLD, CAD s/p cardiac stents x 2, active smoker. She obtained her b/l carotid duplex scan yesterday. Discussed her case with vascular team, Dr. Olivarez and vascular to plan for CEA.     Suggestions:  - Continue current management per primary team and vascular team     Will sign off. Case to be discussed with attending physician.   Signed out to PGY-3 Dr. Cinthya Truner, NP  [phone]

## 2019-08-27 NOTE — PROGRESS NOTE ADULT - SUBJECTIVE AND OBJECTIVE BOX
GENERAL SURGERY PROGRESS NOTE     MARCO A LAGUNA  [address] [place] [address] day :8d  POD:  Procedure: Left carotid endarterectomy    Surgical Attending: Erasmo Olivarez  Overnight events: POD 1 CEA, complaining of head ache and sore throat, patient states she regularly gets headaches, neurologicaly intact.     T(F): 97.1 (08-31-19 @ 12:45), Max: 98.5 (08-31-19 @ 05:27)  HR: 91 (08-31-19 @ 12:45) (68 - 91)  BP: 121/62 (08-31-19 @ 12:45) (115/59 - 146/67)  ABP: --  ABP(mean): --  RR: 18 (08-31-19 @ 12:45) (18 - 18)  SpO2: 93% (08-31-19 @ 07:45) (93% - 94%)      08-30-19 @ 07:01  -  08-31-19 @ 07:00  --------------------------------------------------------  IN:    Lactated Ringers IV Bolus: 250 mL    Oral Fluid: 720 mL    sodium chloride 0.9%: 200 mL    sodium chloride 0.9%: 100 mL  Total IN: 1270 mL    OUT:    Voided: 350 mL  Total OUT: 350 mL    Total NET: 920 mL      08-31-19 @ 07:01  -  08-31-19 @ 19:58  --------------------------------------------------------  IN:    Oral Fluid: 120 mL  Total IN: 120 mL    OUT:  Total OUT: 0 mL    Total NET: 120 mL      GI proph:  pantoprazole    Tablet 40 milliGRAM(s) Oral before breakfast    AC/ proph: aspirin enteric coated 81 milliGRAM(s) Oral daily  heparin  Injectable 5000 Unit(s) SubCutaneous every 8 hours    ABx:     PHYSICAL EXAM:  GENERAL: NAD, well-appearing  HEENT: Neck dressing CDI, mild swelling of the Left incision areaa, no signs of hematoma formation  CHEST/LUNG: Clear to auscultation bilaterally  HEART: Regular rate and rhythm  ABDOMEN: Soft, Nontender, Nondistended;   EXTREMITIES:  No clubbing, cyanosis, or edema      LABS  Labs:  CAPILLARY BLOOD GLUCOSE                              10.4   8.17  )-----------( 209      ( 31 Aug 2019 08:50 )             31.5         08-31    141  |  106  |  8<L>  ----------------------------<  110<H>  3.8   |  26  |  0.6<L>      Calcium, Total Serum: 8.4 mg/dL (08-31-19 @ 08:50)      LFTs:         Coags:     13.10  ----< 1.14    ( 30 Aug 2019 04:45 )     27.2        CARDIAC MARKERS ( 31 Aug 2019 08:50 )  x     / <0.01 ng/mL / 116 U/L / x     / 2.1 ng/mL  CARDIAC MARKERS ( 30 Aug 2019 04:45 )  x     / <0.01 ng/mL / 83 U/L / x     / 1.4 ng/mL  CARDIAC MARKERS ( 29 Aug 2019 23:30 )  x     / <0.01 ng/mL / 82 U/L / x     / 1.2 ng/mL

## 2019-08-27 NOTE — PROGRESS NOTE ADULT - ASSESSMENT
A/P:  MARCO A LAGUNA is a 78yFemale HD/POD1 from Left carotid endarterectomy  Plan   Care as per SICU   Poss DG today   regular diet   OOB   Ambulating

## 2019-08-27 NOTE — PROGRESS NOTE ADULT - SUBJECTIVE AND OBJECTIVE BOX
NEUROENDOVASCULAR PROGRESS NOTE    HPI: This is a 78 years old right handed  woman with past medical history of HTN, DLD, CAD s/p cardiac stents x 2, active smoker who presents to ED for complaints of dizziness and headache. She reports of suddenly experiencing sensation of everything spinning around her when trying to get out of bed in the morning. The dizziness didn't resolve with rest and also had dull constant headache. Both symptoms lasted the whole day, patient was unable to ambulate. Pt states she has been having ringing sensation in her ears for past few years, but not on this admission. CT head negative for any acute intracranial pathology. But on her MR brain and MRA head/neck she is found to have incident left proximal ICA moderate stenosis and with chronic lacunar infarct. Neuroendovascular is consulted by Medicine team Dr. Cm for evaluation if her LICA stenosis is contributing to her symptoms.     Interval History:   8/27/19: Patient seen at bedside, still complains of "aching headache all around my head," intermittently. She had her b/l carotid duplex scan yesterday.     PAST MEDICAL & SURGICAL HISTORY:  Cataract  CAD (coronary artery disease)  HLD (hyperlipidemia)  HTN (hypertension)  H/O ovarian cystectomy  H/O hernia repair  History of tonsillectomy  History of cholecystectomy  History of surgery: cardiac stents x2    FAMILY HISTORY:  FH: hypertension (Mother): mother  FH: Alzheimers disease (Mother): mother    SOCIAL HISTORY:  Smoking/ETOH/Drugs: + smoking; denies drugs and etoh    Allergies  No Known Allergies  Intolerances    REVIEW OF SYSTEMS  Constitutional: No fever, weight loss or fatigue  Eyes: No eye pain, visual disturbances, or discharge  ENMT:  No difficulty hearing, tinnitus, vertigo; No sinus or throat pain  Neck: No pain or stiffness  Respiratory: No cough, wheezing, chills or hemoptysis  Cardiovascular: No chest pain, palpitations, shortness of breath, dizziness or leg swelling  Gastrointestinal: No abdominal pain. No nausea, vomiting or hematemesis; No diarrhea or constipation  Genitourinary: No dysuria, frequency, hematuria or incontinence  Neurological: As per HPI  Skin: No itching, burning, rashes or lesions   Endocrine: No heat or cold intolerance; No hair loss  Musculoskeletal: No joint pain or swelling; No muscle, back or extremity pain  Psychiatric: No depression, anxiety, mood swings or difficulty sleeping  Heme/Lymph: No easy bruising or bleeding gums    PHYSICAL EXAM:    Vital Signs Last 24 Hrs  T(C): 36 (08-27-19 @ 05:50), Max: 36.5 (08-26-19 @ 13:37)  T(F): 96.8 (08-27-19 @ 05:50), Max: 97.7 (08-26-19 @ 13:37)  HR: 63 (08-27-19 @ 05:50) (63 - 66)  BP: 107/48 (08-27-19 @ 05:50) (107/48 - 139/58)  BP(mean): --  RR: 18 (08-27-19 @ 05:50) (18 - 18)  SpO2: --    Neurologic Exam:  Mental status: Awake, alert and oriented x3.  Recent and remote memory intact.  Naming, repetition and comprehension intact.  Attention/concentration intact.  No dysarthria, no aphasia.  Fund of knowledge appropriate.    Cranial nerves: Pupils equally round and reactive to light, visual fields full, no nystagmus, extraocular muscles intact, V1 through V3 intact bilaterally and symmetric, face symmetric, hearing intact to finger rub, palate elevation symmetric, tongue was midline, sternocleidomastoid/shoulder shrug strength bilaterally 5/5.    Motor:  Normal bulk and tone, strength 5/5 in bilateral upper and lower extremities.   strength 5/5.  Sensation: Intact to light touch.  No neglect.   Coordination: No dysmetria on finger-to-nose and heel-to-shin.  Reflexes: 2+ in upper and lower extremities, downgoing toes bilaterally  Gait: Deferred    Cardio: +S1S2 No M/R/G, No JVD  Pulm: CTA B/L no W/R/R  Skin: Warm, Dry, Capillary refill <2 seconds, good skin turgor  Abd: Soft, NTND  Ext: no c/c/e    NIH STROKE SCALE  Item	                                                        Score  1 a.	Level of Consciousness	               	0  1 b. LOC Questions	                                    0  1 c.	LOC Commands	                               	0  2.	Best Gaze	                                    0  3.	Visual	                                                0  4.	Facial Palsy	                                    0  5 a.	Motor Arm - Left	                        0  5 b.	Motor Arm - Right	                        0  6 a.	Motor Leg - Left	                         0  6 b.	Motor Leg - Right	                        0  7.	Limb Ataxia	                                    0  8.	Sensory	                                                0  9.	Language	                                    0  10.	Dysarthria	                                    0  11.	Extinction and Inattention  	        0  ______________________________________  TOTAL	                                                        0    mRS: 0 No symptoms at all    MEDICATIONS  (STANDING):  aspirin  chewable 162 milliGRAM(s) Oral daily  atorvastatin 40 milliGRAM(s) Oral at bedtime  clopidogrel Tablet 75 milliGRAM(s) Oral daily  enoxaparin Injectable 40 milliGRAM(s) SubCutaneous daily  lisinopril 20 milliGRAM(s) Oral daily  metoprolol tartrate 25 milliGRAM(s) Oral two times a day      LABS                          11.2   6.99  )-----------( 221      ( 27 Aug 2019 06:01 )             33.6       08-27    144  |  108  |  10  ----------------------------<  101<H>  4.0   |  25  |  0.7    Ca    8.8      27 Aug 2019 06:01  Mg     2.0     08-27                        11.1   6.01  )-----------( 236      ( 26 Aug 2019 05:41 )             32.8     08-26    142  |  107  |  10  ----------------------------<  100<H>  3.8   |  24  |  0.7    Ca    9.0      26 Aug 2019 05:41  Mg     1.9     08-26    RADIOLOGY/EKG:    < from: CT Head No Cont (08.22.19 @ 21:39) >  IMPRESSION:  No CT evidence of acute intracranial pathology.   Chronic microvascular ischemic changes.    < from: MR Head No Cont (08.23.19 @ 16:39) >  IMPRESSION:    1.  Periventricular and subcortical white matter chronic small vessel ischemic changes and multiple old lacunar infarcts as described above.  2.  No acute infarcts or intracranial hemorrhage.    < from: MR Angio Neck w/ IV Cont (08.23.19 @ 16:40) >  IMPRESSION:   1.  Short 0.5 cm segment of moderate (60-69%) stenosis of the proximal left internal carotid artery.  2.  Calcified atheromatous plaque proximal right ICA with no significant stenosis.  3.  Dominant left vertebral artery.    < from: MR Angio Head No Cont (08.23.19 @ 16:39) >    IMPRESSION:   Unremarkable MRA of the brain without contrast.    QRS axis to [] ° and NSR at a rate of [] BPM. There was no atrial enlargement. There was no ventricular hypertrophy. There were no ST-T changes and all intervals were normal.

## 2019-08-27 NOTE — PROGRESS NOTE ADULT - SUBJECTIVE AND OBJECTIVE BOX
HPI  Patient is a 78y old Female who presents with a chief complaint of vertigo, ataxia (27 Aug 2019 13:05)    Currently admitted to medicine with the primary diagnosis of Ataxia     Today is hospital day 4d.     INTERVAL HPI / OVERNIGHT EVENTS:  Patient was examined and seen at bedside. This morning she is resting comfortably in bed and reports no new issues or overnight events. Still complaining of dizziness except when is laying in bed. No changes in bowel or urinary habits. Denies CP and SOB.      ROS: Otherwise unremarkable     PAST MEDICAL & SURGICAL HISTORY  Cataract  CAD (coronary artery disease)  HLD (hyperlipidemia)  HTN (hypertension)  H/O ovarian cystectomy  H/O hernia repair  History of tonsillectomy  History of cholecystectomy  History of surgery: cardiac stents x2    ALLERGIES  No Known Allergies    MEDICATIONS  STANDING MEDICATIONS  aspirin  chewable 162 milliGRAM(s) Oral daily  atorvastatin 40 milliGRAM(s) Oral at bedtime  clopidogrel Tablet 75 milliGRAM(s) Oral daily  enoxaparin Injectable 40 milliGRAM(s) SubCutaneous daily  lisinopril 20 milliGRAM(s) Oral daily  metoprolol tartrate 25 milliGRAM(s) Oral two times a day    PRN MEDICATIONS  acetaminophen   Tablet .. 650 milliGRAM(s) Oral every 6 hours PRN  meclizine 25 milliGRAM(s) Oral every 8 hours PRN    VITALS:  T(F): 98  HR: 75  BP: 115/61  RR: 18  SpO2: 96%    PHYSICAL EXAM  GEN: NAD, Resting comfortably in bed  PULM: Clear to auscultation bilaterally, No wheezes  CVS: Regular rate and rhythm, S1-S2, no murmurs  ABD: Soft, non-tender, non-distended, no guarding  EXT: No edema  NEURO: AAOx3, no focal deficits    LABS                        11.2   6.99  )-----------( 221      ( 27 Aug 2019 06:01 )             33.6     08-27    144  |  108  |  10  ----------------------------<  101<H>  4.0   |  25  |  0.7    Ca    8.8      27 Aug 2019 06:01  Mg     2.0     08-27    RADIOLOGY HPI  Patient is a 78y old Female who presents with a chief complaint of vertigo, ataxia (27 Aug 2019 13:05)    Currently admitted to medicine with the primary diagnosis of Ataxia     Today is hospital day 4d.     INTERVAL HPI / OVERNIGHT EVENTS:  Patient was examined and seen at bedside. This morning she is resting comfortably in bed and reports no new issues or overnight events. Still complaining of dizziness except when is laying in bed. No changes in bowel or urinary habits. Denies CP and SOB.      ROS: Otherwise unremarkable     PAST MEDICAL & SURGICAL HISTORY  Cataract  CAD (coronary artery disease)  HLD (hyperlipidemia)  HTN (hypertension)  H/O ovarian cystectomy  H/O hernia repair  History of tonsillectomy  History of cholecystectomy  History of surgery: cardiac stents x2    ALLERGIES  No Known Allergies    MEDICATIONS  STANDING MEDICATIONS  aspirin  chewable 162 milliGRAM(s) Oral daily  atorvastatin 40 milliGRAM(s) Oral at bedtime  clopidogrel Tablet 75 milliGRAM(s) Oral daily  enoxaparin Injectable 40 milliGRAM(s) SubCutaneous daily  lisinopril 20 milliGRAM(s) Oral daily  metoprolol tartrate 25 milliGRAM(s) Oral two times a day    PRN MEDICATIONS  acetaminophen   Tablet .. 650 milliGRAM(s) Oral every 6 hours PRN  meclizine 25 milliGRAM(s) Oral every 8 hours PRN    VITALS:  T(F): 98  HR: 75  BP: 115/61  RR: 18  SpO2: 96%    PHYSICAL EXAM  GEN: NAD, Resting comfortably in bed  PULM: Clear to auscultation bilaterally, No wheezes  CVS: Regular rate and rhythm, S1-S2, no murmurs  ABD: Soft, non-tender, non-distended, no guarding  EXT: No edema  NEURO: AAOx3, no focal deficits    LABS                        11.2   6.99  )-----------( 221      ( 27 Aug 2019 06:01 )             33.6     08-27    144  |  108  |  10  ----------------------------<  101<H>  4.0   |  25  |  0.7    Ca    8.8      27 Aug 2019 06:01  Mg     2.0     08-27    RADIOLOGY    < from: [facility] Duplex Carotid, Bilat (08.26.19 @ 16:52) >    Impression:    20-39% stenosis of the right internal carotid artery.  >80% stenosis of the left internal carotid artery.    ICD-10: R42    < end of copied text >    < from: MR Angio Neck w/ IV Cont (08.23.19 @ 16:40) >  IMPRESSION:     1.  Short 0.5 cm segment of moderate (60-69%) stenosis of the proximal   left internal carotid artery.    2.  Calcified atheromatous plaque proximal right ICA with no significant   stenosis.    3.  Dominant left vertebral artery.    < end of copied text >

## 2019-08-27 NOTE — PROGRESS NOTE ADULT - SUBJECTIVE AND OBJECTIVE BOX
Patient was seen and examined. Spoke with RN. Chart reviewed.  awaiting cea    No events overnight.  Vital Signs Last 24 Hrs  T(F): 96.8 (27 Aug 2019 05:50), Max: 97.7 (26 Aug 2019 13:37)  HR: 63 (27 Aug 2019 05:50) (63 - 66)  BP: 107/48 (27 Aug 2019 05:50) (107/48 - 139/58)  SpO2: 96% (27 Aug 2019 10:06) (96% - 96%)  MEDICATIONS  (STANDING):  aspirin  chewable 162 milliGRAM(s) Oral daily  atorvastatin 40 milliGRAM(s) Oral at bedtime  clopidogrel Tablet 75 milliGRAM(s) Oral daily  enoxaparin Injectable 40 milliGRAM(s) SubCutaneous daily  lisinopril 20 milliGRAM(s) Oral daily  metoprolol tartrate 25 milliGRAM(s) Oral two times a day    MEDICATIONS  (PRN):  acetaminophen   Tablet .. 650 milliGRAM(s) Oral every 6 hours PRN Temp greater or equal to 38C (100.4F), Mild Pain (1 - 3)  meclizine 25 milliGRAM(s) Oral every 8 hours PRN Dizziness    Labs:                        11.2   6.99  )-----------( 221      ( 27 Aug 2019 06:01 )             33.6                         11.1   6.01  )-----------( 236      ( 26 Aug 2019 05:41 )             32.8     27 Aug 2019 06:01    144    |  108    |  10     ----------------------------<  101    4.0     |  25     |  0.7    26 Aug 2019 05:41    142    |  107    |  10     ----------------------------<  100    3.8     |  24     |  0.7      Ca    8.8        27 Aug 2019 06:01  Ca    9.0        26 Aug 2019 05:41  Mg     2.0       27 Aug 2019 06:01  Mg     1.9       26 Aug 2019 05:41              Radiology:    General: comfortable, NAD  Neurology: A&Ox3, nonfocal  Head:  Normocephalic, atraumatic  ENT:  Mucosa moist, no ulcerations  Neck:  Supple, no JVD,   Skin: no breakdowns (as per RN)  Resp: CTA B/L  CV: RRR, S1S2,   GI: Soft, NT, bowel sounds  MS: No edema, + peripheral pulses, FROM all 4 extremity

## 2019-08-28 LAB
ALBUMIN SERPL ELPH-MCNC: 3.5 G/DL — SIGNIFICANT CHANGE UP (ref 3.5–5.2)
ALP SERPL-CCNC: 100 U/L — SIGNIFICANT CHANGE UP (ref 30–115)
ALT FLD-CCNC: 23 U/L — SIGNIFICANT CHANGE UP (ref 0–41)
ANION GAP SERPL CALC-SCNC: 10 MMOL/L — SIGNIFICANT CHANGE UP (ref 7–14)
APTT BLD: 29.8 SEC — SIGNIFICANT CHANGE UP (ref 27–39.2)
AST SERPL-CCNC: 34 U/L — SIGNIFICANT CHANGE UP (ref 0–41)
BILIRUB SERPL-MCNC: 0.2 MG/DL — SIGNIFICANT CHANGE UP (ref 0.2–1.2)
BUN SERPL-MCNC: 9 MG/DL — LOW (ref 10–20)
CALCIUM SERPL-MCNC: 8.8 MG/DL — SIGNIFICANT CHANGE UP (ref 8.5–10.1)
CHLORIDE SERPL-SCNC: 109 MMOL/L — SIGNIFICANT CHANGE UP (ref 98–110)
CO2 SERPL-SCNC: 24 MMOL/L — SIGNIFICANT CHANGE UP (ref 17–32)
CREAT SERPL-MCNC: 0.7 MG/DL — SIGNIFICANT CHANGE UP (ref 0.7–1.5)
GLUCOSE SERPL-MCNC: 100 MG/DL — HIGH (ref 70–99)
HCT VFR BLD CALC: 33.9 % — LOW (ref 37–47)
HGB BLD-MCNC: 11.4 G/DL — LOW (ref 12–16)
INR BLD: 1.04 RATIO — SIGNIFICANT CHANGE UP (ref 0.65–1.3)
MAGNESIUM SERPL-MCNC: 2.1 MG/DL — SIGNIFICANT CHANGE UP (ref 1.8–2.4)
MCHC RBC-ENTMCNC: 31.1 PG — HIGH (ref 27–31)
MCHC RBC-ENTMCNC: 33.6 G/DL — SIGNIFICANT CHANGE UP (ref 32–37)
MCV RBC AUTO: 92.6 FL — SIGNIFICANT CHANGE UP (ref 81–99)
NRBC # BLD: 0 /100 WBCS — SIGNIFICANT CHANGE UP (ref 0–0)
PHOSPHATE SERPL-MCNC: 3.4 MG/DL — SIGNIFICANT CHANGE UP (ref 2.1–4.9)
PLATELET # BLD AUTO: 226 K/UL — SIGNIFICANT CHANGE UP (ref 130–400)
POTASSIUM SERPL-MCNC: 3.9 MMOL/L — SIGNIFICANT CHANGE UP (ref 3.5–5)
POTASSIUM SERPL-SCNC: 3.9 MMOL/L — SIGNIFICANT CHANGE UP (ref 3.5–5)
PROT SERPL-MCNC: 6.4 G/DL — SIGNIFICANT CHANGE UP (ref 6–8)
PROTHROM AB SERPL-ACNC: 12 SEC — SIGNIFICANT CHANGE UP (ref 9.95–12.87)
RBC # BLD: 3.66 M/UL — LOW (ref 4.2–5.4)
RBC # FLD: 13.4 % — SIGNIFICANT CHANGE UP (ref 11.5–14.5)
SODIUM SERPL-SCNC: 143 MMOL/L — SIGNIFICANT CHANGE UP (ref 135–146)
WBC # BLD: 6.76 K/UL — SIGNIFICANT CHANGE UP (ref 4.8–10.8)
WBC # FLD AUTO: 6.76 K/UL — SIGNIFICANT CHANGE UP (ref 4.8–10.8)

## 2019-08-28 PROCEDURE — 78452 HT MUSCLE IMAGE SPECT MULT: CPT | Mod: 26

## 2019-08-28 PROCEDURE — 93016 CV STRESS TEST SUPVJ ONLY: CPT

## 2019-08-28 PROCEDURE — 71045 X-RAY EXAM CHEST 1 VIEW: CPT | Mod: 26

## 2019-08-28 PROCEDURE — 93018 CV STRESS TEST I&R ONLY: CPT

## 2019-08-28 RX ORDER — ADENOSINE 3 MG/ML
60 INJECTION INTRAVENOUS ONCE
Refills: 0 | Status: COMPLETED | OUTPATIENT
Start: 2019-08-28 | End: 2019-08-28

## 2019-08-28 RX ORDER — SODIUM CHLORIDE 9 MG/ML
1000 INJECTION, SOLUTION INTRAVENOUS
Refills: 0 | Status: DISCONTINUED | OUTPATIENT
Start: 2019-08-28 | End: 2019-08-29

## 2019-08-28 RX ADMIN — SODIUM CHLORIDE 50 MILLILITER(S): 9 INJECTION, SOLUTION INTRAVENOUS at 20:12

## 2019-08-28 RX ADMIN — ATORVASTATIN CALCIUM 40 MILLIGRAM(S): 80 TABLET, FILM COATED ORAL at 21:49

## 2019-08-28 RX ADMIN — Medication 25 MILLIGRAM(S): at 05:53

## 2019-08-28 RX ADMIN — CLOPIDOGREL BISULFATE 75 MILLIGRAM(S): 75 TABLET, FILM COATED ORAL at 12:32

## 2019-08-28 RX ADMIN — Medication 162 MILLIGRAM(S): at 12:32

## 2019-08-28 RX ADMIN — LISINOPRIL 20 MILLIGRAM(S): 2.5 TABLET ORAL at 05:53

## 2019-08-28 RX ADMIN — SODIUM CHLORIDE 50 MILLILITER(S): 9 INJECTION, SOLUTION INTRAVENOUS at 19:00

## 2019-08-28 RX ADMIN — ADENOSINE 600 MILLIGRAM(S): 3 INJECTION INTRAVENOUS at 16:01

## 2019-08-28 RX ADMIN — Medication 25 MILLIGRAM(S): at 17:57

## 2019-08-28 NOTE — PROGRESS NOTE ADULT - ASSESSMENT
Left Carotid Stenosis,  htn  dizziness Virtigo      pt is stable for planned lt carotid surgery    Recommend   PERSANTINE NUC stress test to evaluate cad   cont other meds as listed, except ok to stop plavix and asa pre op;

## 2019-08-28 NOTE — PROGRESS NOTE ADULT - SUBJECTIVE AND OBJECTIVE BOX
Cardiology:  pt is seen and examined, remains asymptamatic, no cp no sob       Vital Signs Last 24 Hrs  T(C): 36.7 (28 Aug 2019 05:12), Max: 36.8 (27 Aug 2019 20:30)  T(F): 98.1 (28 Aug 2019 05:12), Max: 98.2 (27 Aug 2019 20:30)  HR: 67 (28 Aug 2019 05:12) (67 - 79)  BP: 134/63 (28 Aug 2019 05:12) (115/61 - 134/63)  BP(mean): --  RR: 18 (28 Aug 2019 05:12) (18 - 18)  SpO2: 96% (27 Aug 2019 10:06) (96% - 96%)    PHYSICAL EXAM  HEAD:  Atraumatic, Normocephalic  NECK: Supple, no thyromegaly.    CV: Nml S1/S2, no S3. JVP < 7  cm. No carotid bruits.   PULMONARY:   ABDOMEN: Soft nontender and positive bowel sounds   EXTREMITIES:    NEUROLOGICAL: no focal deficits   PSYCH:    MEDICATIONS  (STANDING):  aspirin  chewable 162 milliGRAM(s) Oral daily  atorvastatin 40 milliGRAM(s) Oral at bedtime  clopidogrel Tablet 75 milliGRAM(s) Oral daily  enoxaparin Injectable 40 milliGRAM(s) SubCutaneous daily  lisinopril 20 milliGRAM(s) Oral daily  metoprolol tartrate 25 milliGRAM(s) Oral two times a day    MEDICATIONS  (PRN):  acetaminophen   Tablet .. 650 milliGRAM(s) Oral every 6 hours PRN Temp greater or equal to 38C (100.4F), Mild Pain (1 - 3)  meclizine 25 milliGRAM(s) Oral every 8 hours PRN Dizziness      LABS                        11.4   6.76  )-----------( 226      ( 28 Aug 2019 06:25 )             33.9     08-28    143  |  109  |  9<L>  ----------------------------<  100<H>  3.9   |  24  |  0.7    Ca    8.8      28 Aug 2019 06:25  Phos  3.4     08-28  Mg     2.1     08-28    TPro  6.4  /  Alb  3.5  /  TBili  0.2  /  DBili  x   /  AST  34  /  ALT  23  /  AlkPhos  100  08-28    LIVER FUNCTIONS - ( 28 Aug 2019 06:25 )  Alb: 3.5 g/dL / Pro: 6.4 g/dL / ALK PHOS: 100 U/L / ALT: 23 U/L / AST: 34 U/L / GGT: x                 PT/INR - ( 28 Aug 2019 06:25 )   PT: 12.00 sec;   INR: 1.04 ratio         PTT - ( 28 Aug 2019 06:25 )  PTT:29.8 sec    TELEMETRY  nsr normal ekg  ECHOCARDIOGRAM  echo lvef 65% no a/stenosis

## 2019-08-28 NOTE — PROGRESS NOTE ADULT - SUBJECTIVE AND OBJECTIVE BOX
Patient was seen and examined. Spoke with RN. Chart reviewed.  awaiting adenosine nuclear stress testing  before cea,    No events overnight.  Vital Signs Last 24 Hrs  T(F): 98.1 (28 Aug 2019 05:12), Max: 98.2 (27 Aug 2019 20:30)  HR: 67 (28 Aug 2019 05:12) (67 - 79)  BP: 134/63 (28 Aug 2019 05:12) (115/61 - 134/63)  SpO2: 97% (28 Aug 2019 11:38) (97% - 97%)  MEDICATIONS  (STANDING):  adenosine Injectable (ADENOSCAN) 60 milliGRAM(s) IV Bolus once  aspirin  chewable 162 milliGRAM(s) Oral daily  atorvastatin 40 milliGRAM(s) Oral at bedtime  clopidogrel Tablet 75 milliGRAM(s) Oral daily  enoxaparin Injectable 40 milliGRAM(s) SubCutaneous daily  lisinopril 20 milliGRAM(s) Oral daily  metoprolol tartrate 25 milliGRAM(s) Oral two times a day    MEDICATIONS  (PRN):  acetaminophen   Tablet .. 650 milliGRAM(s) Oral every 6 hours PRN Temp greater or equal to 38C (100.4F), Mild Pain (1 - 3)  meclizine 25 milliGRAM(s) Oral every 8 hours PRN Dizziness    Labs:                        11.4   6.76  )-----------( 226      ( 28 Aug 2019 06:25 )             33.9                         11.2   6.99  )-----------( 221      ( 27 Aug 2019 06:01 )             33.6     28 Aug 2019 06:25    143    |  109    |  9      ----------------------------<  100    3.9     |  24     |  0.7    27 Aug 2019 06:01    144    |  108    |  10     ----------------------------<  101    4.0     |  25     |  0.7      Ca    8.8        28 Aug 2019 06:25  Ca    8.8        27 Aug 2019 06:01  Phos  3.4       28 Aug 2019 06:25  Mg     2.1       28 Aug 2019 06:25  Mg     2.0       27 Aug 2019 06:01    TPro  6.4    /  Alb  3.5    /  TBili  0.2    /  DBili  x      /  AST  34     /  ALT  23     /  AlkPhos  100    28 Aug 2019 06:25    PT/INR - ( 28 Aug 2019 06:25 )   PT: 12.00 sec;   INR: 1.04 ratio         PTT - ( 28 Aug 2019 06:25 )  PTT:29.8 sec        Radiology:    General: comfortable, NAD  Neurology: A&Ox3, nonfocal  Head:  Normocephalic, atraumatic  ENT:  Mucosa moist, no ulcerations  Neck:  Supple, no JVD,   Skin: no breakdowns (as per RN)  Resp: CTA B/L  CV: RRR, S1S2,   GI: Soft, NT, bowel sounds  MS: No edema, + peripheral pulses, FROM all 4 extremity

## 2019-08-28 NOTE — PROGRESS NOTE ADULT - SUBJECTIVE AND OBJECTIVE BOX
HPI  Patient is a 78y old Female who presents with a chief complaint of vertigo, ataxia  pre op clearance (28 Aug 2019 08:54)    Currently admitted to medicine with the primary diagnosis of Ataxia     Today is hospital day 5d.     INTERVAL HPI / OVERNIGHT EVENTS:  Patient was examined and seen at bedside. This morning she was walking around on room air and reports no new issues or overnight events. Still complaining of dizziness except when is laying in bed. Not sure when it comes on. The patient is aware of her scheduled procedure. No changes in bowel or urinary habits. Denies CP and SOB.     ROS: Otherwise unremarkable     PAST MEDICAL & SURGICAL HISTORY  Cataract  CAD (coronary artery disease)  HLD (hyperlipidemia)  HTN (hypertension)  H/O ovarian cystectomy  H/O hernia repair  History of tonsillectomy  History of cholecystectomy  History of surgery: cardiac stents x2    ALLERGIES  No Known Allergies    MEDICATIONS  STANDING MEDICATIONS  adenosine Injectable (ADENOSCAN) 60 milliGRAM(s) IV Bolus once  aspirin  chewable 162 milliGRAM(s) Oral daily  atorvastatin 40 milliGRAM(s) Oral at bedtime  clopidogrel Tablet 75 milliGRAM(s) Oral daily  enoxaparin Injectable 40 milliGRAM(s) SubCutaneous daily  lisinopril 20 milliGRAM(s) Oral daily  metoprolol tartrate 25 milliGRAM(s) Oral two times a day    PRN MEDICATIONS  acetaminophen   Tablet .. 650 milliGRAM(s) Oral every 6 hours PRN  meclizine 25 milliGRAM(s) Oral every 8 hours PRN    VITALS:  T(F): 98.1  HR: 67  BP: 134/63  RR: 18  SpO2: --    PHYSICAL EXAM  GEN: NAD, Resting comfortably in bed  PULM: Clear to auscultation bilaterally, No wheezes  CVS: Regular rate and rhythm, S1-S2, no murmurs  ABD: Soft, non-tender, non-distended, no guarding  EXT: No edema  NEURO: AAOx3, no focal deficits      LABS                        11.4   6.76  )-----------( 226      ( 28 Aug 2019 06:25 )             33.9     08-28    143  |  109  |  9<L>  ----------------------------<  100<H>  3.9   |  24  |  0.7    Ca    8.8      28 Aug 2019 06:25  Phos  3.4     08-28  Mg     2.1     08-28    TPro  6.4  /  Alb  3.5  /  TBili  0.2  /  DBili  x   /  AST  34  /  ALT  23  /  AlkPhos  100  08-28    PT/INR - ( 28 Aug 2019 06:25 )   PT: 12.00 sec;   INR: 1.04 ratio         PTT - ( 28 Aug 2019 06:25 )  PTT:29.8 sec    < from: [facility] Duplex Carotid, Bilat (08.26.19 @ 16:52) >    Impression:    20-39% stenosis of the right internal carotid artery.  >80% stenosis of the left internal carotid artery.    ICD-10: R42    < end of copied text >    < from: MR Angio Neck w/ IV Cont (08.23.19 @ 16:40) >  IMPRESSION:     1.  Short 0.5 cm segment of moderate (60-69%) stenosis of the proximal   left internal carotid artery.    2.  Calcified atheromatous plaque proximal right ICA with no significant   stenosis.    3.  Dominant left vertebral artery.

## 2019-08-28 NOTE — PROGRESS NOTE ADULT - ASSESSMENT
78 years old female known to have HTN, DLD, CAD s/p 2 cardiac stents on Plavix and ASA, presents to ED for vertigo and ataxia x 1 day.  As Per pt, yesterday morning when she tried getting out of the bed in the morning, she suddenly started experiencing sensation of everything spinning around.  CTH did not show any acute pathology, EKG NSR and she was seen by neurology and admitted for vertigo and ataxia.    # Vertigo, ataxia  - Cardiology consult appreciated - patient is scheduled for a nuclear stress test. Cannot do adenosine today as patient had caffeine, so will do part of it tomorrow.   - Will F/U w/ vascular as to when procedure will happen.  - May need ENT f/u outpatient  - Neuro consult appreciated. Continuing with meclizine 25mg TID and increasing Atorvastatin to 40mg QD. Cleared.  - Carotid duplex showed >80% stenosis in LICA and 20-39% stenosis in HANANE  - Vascular consult appreciated. Planning OR later this week.  - Continuing secondary stroke prevention with  mg QD and Plavix   - MRI brain: no acute infarcts or intracranial hemorrhage  - Vitamin B12/folate wnl  - CTH neg for acute pathology, no focal weakness  - TSH low (0.01), T4 normal (6.5). Outpatient Endo?  - MRA neck significant for moderate stenosis (60-69%) of proximal LICA + calcified atheromatous plaque in HANANE w/o significant stenosis.  - Echo unremarkable, EF 60-65%  - Refused PT  - Fall precautions  - No evidence of CVA and Vit B12 def, pt would likely need outpt ENT eval and vestibular rehab or REEG to rule out seizure  - Discontinuing telemetry    # HTN  - c/w lisinopril 20mg QD  - c/w metoprolol 25mg BID    # CAD s/p 2 stents  - c/w Aspirin 162mg QD - can hold before procedure   - c/w Metoprolol 25mg BID  - c/w Plavix 75mg QD - can hold before procedure   - c/w Atorvastatin 40mg QD     # DLD  - c/w Atorvastatin 40mg QD    DVT PPx: Lovenox  GI PPx: not indicated  Diet: dash  Activity: fall precautions

## 2019-08-29 ENCOUNTER — RESULT REVIEW (OUTPATIENT)
Age: 79
End: 2019-08-29

## 2019-08-29 LAB
ALBUMIN SERPL ELPH-MCNC: 3.7 G/DL — SIGNIFICANT CHANGE UP (ref 3.5–5.2)
ALP SERPL-CCNC: 110 U/L — SIGNIFICANT CHANGE UP (ref 30–115)
ALT FLD-CCNC: 31 U/L — SIGNIFICANT CHANGE UP (ref 0–41)
ANION GAP SERPL CALC-SCNC: 10 MMOL/L — SIGNIFICANT CHANGE UP (ref 7–14)
ANION GAP SERPL CALC-SCNC: 13 MMOL/L — SIGNIFICANT CHANGE UP (ref 7–14)
APTT BLD: 29.4 SEC — SIGNIFICANT CHANGE UP (ref 27–39.2)
AST SERPL-CCNC: 38 U/L — SIGNIFICANT CHANGE UP (ref 0–41)
BILIRUB SERPL-MCNC: <0.2 MG/DL — SIGNIFICANT CHANGE UP (ref 0.2–1.2)
BLD GP AB SCN SERPL QL: SIGNIFICANT CHANGE UP
BUN SERPL-MCNC: 11 MG/DL — SIGNIFICANT CHANGE UP (ref 10–20)
BUN SERPL-MCNC: 8 MG/DL — LOW (ref 10–20)
CALCIUM SERPL-MCNC: 8.5 MG/DL — SIGNIFICANT CHANGE UP (ref 8.5–10.1)
CALCIUM SERPL-MCNC: 8.9 MG/DL — SIGNIFICANT CHANGE UP (ref 8.5–10.1)
CHLORIDE SERPL-SCNC: 105 MMOL/L — SIGNIFICANT CHANGE UP (ref 98–110)
CHLORIDE SERPL-SCNC: 109 MMOL/L — SIGNIFICANT CHANGE UP (ref 98–110)
CK MB CFR SERPL CALC: <1 NG/ML — SIGNIFICANT CHANGE UP (ref 0.6–6.3)
CK SERPL-CCNC: 65 U/L — SIGNIFICANT CHANGE UP (ref 0–225)
CO2 SERPL-SCNC: 22 MMOL/L — SIGNIFICANT CHANGE UP (ref 17–32)
CO2 SERPL-SCNC: 24 MMOL/L — SIGNIFICANT CHANGE UP (ref 17–32)
CREAT SERPL-MCNC: 0.6 MG/DL — LOW (ref 0.7–1.5)
CREAT SERPL-MCNC: 0.6 MG/DL — LOW (ref 0.7–1.5)
GLUCOSE SERPL-MCNC: 103 MG/DL — HIGH (ref 70–99)
GLUCOSE SERPL-MCNC: 153 MG/DL — HIGH (ref 70–99)
HCT VFR BLD CALC: 33.1 % — LOW (ref 37–47)
HCT VFR BLD CALC: 34.5 % — LOW (ref 37–47)
HGB BLD-MCNC: 11.1 G/DL — LOW (ref 12–16)
HGB BLD-MCNC: 11.4 G/DL — LOW (ref 12–16)
INR BLD: 1.06 RATIO — SIGNIFICANT CHANGE UP (ref 0.65–1.3)
MAGNESIUM SERPL-MCNC: 1.7 MG/DL — LOW (ref 1.8–2.4)
MCHC RBC-ENTMCNC: 30.6 PG — SIGNIFICANT CHANGE UP (ref 27–31)
MCHC RBC-ENTMCNC: 31 PG — SIGNIFICANT CHANGE UP (ref 27–31)
MCHC RBC-ENTMCNC: 33 G/DL — SIGNIFICANT CHANGE UP (ref 32–37)
MCHC RBC-ENTMCNC: 33.5 G/DL — SIGNIFICANT CHANGE UP (ref 32–37)
MCV RBC AUTO: 92.5 FL — SIGNIFICANT CHANGE UP (ref 81–99)
MCV RBC AUTO: 92.7 FL — SIGNIFICANT CHANGE UP (ref 81–99)
NRBC # BLD: 0 /100 WBCS — SIGNIFICANT CHANGE UP (ref 0–0)
NRBC # BLD: 0 /100 WBCS — SIGNIFICANT CHANGE UP (ref 0–0)
PHOSPHATE SERPL-MCNC: 3 MG/DL — SIGNIFICANT CHANGE UP (ref 2.1–4.9)
PLATELET # BLD AUTO: 235 K/UL — SIGNIFICANT CHANGE UP (ref 130–400)
PLATELET # BLD AUTO: 243 K/UL — SIGNIFICANT CHANGE UP (ref 130–400)
POTASSIUM SERPL-MCNC: 3.4 MMOL/L — LOW (ref 3.5–5)
POTASSIUM SERPL-MCNC: 4.2 MMOL/L — SIGNIFICANT CHANGE UP (ref 3.5–5)
POTASSIUM SERPL-SCNC: 3.4 MMOL/L — LOW (ref 3.5–5)
POTASSIUM SERPL-SCNC: 4.2 MMOL/L — SIGNIFICANT CHANGE UP (ref 3.5–5)
PROT SERPL-MCNC: 6.5 G/DL — SIGNIFICANT CHANGE UP (ref 6–8)
PROTHROM AB SERPL-ACNC: 12.2 SEC — SIGNIFICANT CHANGE UP (ref 9.95–12.87)
RBC # BLD: 3.58 M/UL — LOW (ref 4.2–5.4)
RBC # BLD: 3.72 M/UL — LOW (ref 4.2–5.4)
RBC # FLD: 13.3 % — SIGNIFICANT CHANGE UP (ref 11.5–14.5)
RBC # FLD: 13.3 % — SIGNIFICANT CHANGE UP (ref 11.5–14.5)
SODIUM SERPL-SCNC: 140 MMOL/L — SIGNIFICANT CHANGE UP (ref 135–146)
SODIUM SERPL-SCNC: 143 MMOL/L — SIGNIFICANT CHANGE UP (ref 135–146)
TROPONIN T SERPL-MCNC: <0.01 NG/ML — SIGNIFICANT CHANGE UP
WBC # BLD: 7.41 K/UL — SIGNIFICANT CHANGE UP (ref 4.8–10.8)
WBC # BLD: 9.97 K/UL — SIGNIFICANT CHANGE UP (ref 4.8–10.8)
WBC # FLD AUTO: 7.41 K/UL — SIGNIFICANT CHANGE UP (ref 4.8–10.8)
WBC # FLD AUTO: 9.97 K/UL — SIGNIFICANT CHANGE UP (ref 4.8–10.8)

## 2019-08-29 PROCEDURE — 99291 CRITICAL CARE FIRST HOUR: CPT

## 2019-08-29 PROCEDURE — 88311 DECALCIFY TISSUE: CPT | Mod: 26

## 2019-08-29 PROCEDURE — 88305 TISSUE EXAM BY PATHOLOGIST: CPT | Mod: 26

## 2019-08-29 PROCEDURE — 88304 TISSUE EXAM BY PATHOLOGIST: CPT | Mod: 26

## 2019-08-29 PROCEDURE — 93010 ELECTROCARDIOGRAM REPORT: CPT

## 2019-08-29 PROCEDURE — 71045 X-RAY EXAM CHEST 1 VIEW: CPT | Mod: 26

## 2019-08-29 PROCEDURE — 35301 RECHANNELING OF ARTERY: CPT

## 2019-08-29 RX ORDER — METOPROLOL TARTRATE 50 MG
25 TABLET ORAL
Refills: 0 | Status: DISCONTINUED | OUTPATIENT
Start: 2019-08-29 | End: 2019-08-30

## 2019-08-29 RX ORDER — OXYCODONE AND ACETAMINOPHEN 5; 325 MG/1; MG/1
2 TABLET ORAL EVERY 6 HOURS
Refills: 0 | Status: DISCONTINUED | OUTPATIENT
Start: 2019-08-29 | End: 2019-08-31

## 2019-08-29 RX ORDER — HEPARIN SODIUM 5000 [USP'U]/ML
5000 INJECTION INTRAVENOUS; SUBCUTANEOUS EVERY 8 HOURS
Refills: 0 | Status: DISCONTINUED | OUTPATIENT
Start: 2019-08-30 | End: 2019-09-05

## 2019-08-29 RX ORDER — ATORVASTATIN CALCIUM 80 MG/1
40 TABLET, FILM COATED ORAL AT BEDTIME
Refills: 0 | Status: DISCONTINUED | OUTPATIENT
Start: 2019-08-29 | End: 2019-09-05

## 2019-08-29 RX ORDER — DOCUSATE SODIUM 100 MG
100 CAPSULE ORAL THREE TIMES A DAY
Refills: 0 | Status: DISCONTINUED | OUTPATIENT
Start: 2019-08-29 | End: 2019-09-05

## 2019-08-29 RX ORDER — CHLORHEXIDINE GLUCONATE 213 G/1000ML
1 SOLUTION TOPICAL
Refills: 0 | Status: DISCONTINUED | OUTPATIENT
Start: 2019-08-29 | End: 2019-08-29

## 2019-08-29 RX ORDER — BENZOCAINE AND MENTHOL 5; 1 G/100ML; G/100ML
1 LIQUID ORAL ONCE
Refills: 0 | Status: COMPLETED | OUTPATIENT
Start: 2019-08-29 | End: 2019-08-29

## 2019-08-29 RX ORDER — ONDANSETRON 8 MG/1
4 TABLET, FILM COATED ORAL
Refills: 0 | Status: DISCONTINUED | OUTPATIENT
Start: 2019-08-29 | End: 2019-08-29

## 2019-08-29 RX ORDER — ASPIRIN/CALCIUM CARB/MAGNESIUM 324 MG
81 TABLET ORAL DAILY
Refills: 0 | Status: DISCONTINUED | OUTPATIENT
Start: 2019-08-30 | End: 2019-09-05

## 2019-08-29 RX ORDER — ACETAMINOPHEN 500 MG
650 TABLET ORAL EVERY 6 HOURS
Refills: 0 | Status: DISCONTINUED | OUTPATIENT
Start: 2019-08-29 | End: 2019-09-05

## 2019-08-29 RX ORDER — CLOPIDOGREL BISULFATE 75 MG/1
75 TABLET, FILM COATED ORAL DAILY
Refills: 0 | Status: DISCONTINUED | OUTPATIENT
Start: 2019-08-30 | End: 2019-09-05

## 2019-08-29 RX ORDER — OXYCODONE AND ACETAMINOPHEN 5; 325 MG/1; MG/1
1 TABLET ORAL ONCE
Refills: 0 | Status: DISCONTINUED | OUTPATIENT
Start: 2019-08-29 | End: 2019-08-29

## 2019-08-29 RX ORDER — LISINOPRIL 2.5 MG/1
20 TABLET ORAL DAILY
Refills: 0 | Status: DISCONTINUED | OUTPATIENT
Start: 2019-08-29 | End: 2019-08-30

## 2019-08-29 RX ORDER — OXYCODONE AND ACETAMINOPHEN 5; 325 MG/1; MG/1
1 TABLET ORAL EVERY 4 HOURS
Refills: 0 | Status: DISCONTINUED | OUTPATIENT
Start: 2019-08-29 | End: 2019-09-01

## 2019-08-29 RX ORDER — METOPROLOL TARTRATE 50 MG
25 TABLET ORAL
Refills: 0 | Status: DISCONTINUED | OUTPATIENT
Start: 2019-08-29 | End: 2019-08-29

## 2019-08-29 RX ORDER — BENZOCAINE 10 %
1 GEL (GRAM) MUCOUS MEMBRANE THREE TIMES A DAY
Refills: 0 | Status: DISCONTINUED | OUTPATIENT
Start: 2019-08-29 | End: 2019-09-05

## 2019-08-29 RX ORDER — SODIUM CHLORIDE 9 MG/ML
1000 INJECTION INTRAMUSCULAR; INTRAVENOUS; SUBCUTANEOUS
Refills: 0 | Status: DISCONTINUED | OUTPATIENT
Start: 2019-08-29 | End: 2019-08-30

## 2019-08-29 RX ORDER — MAGNESIUM SULFATE 500 MG/ML
2 VIAL (ML) INJECTION ONCE
Refills: 0 | Status: COMPLETED | OUTPATIENT
Start: 2019-08-29 | End: 2019-08-29

## 2019-08-29 RX ORDER — CHLORHEXIDINE GLUCONATE 213 G/1000ML
1 SOLUTION TOPICAL
Refills: 0 | Status: DISCONTINUED | OUTPATIENT
Start: 2019-08-29 | End: 2019-09-05

## 2019-08-29 RX ORDER — PANTOPRAZOLE SODIUM 20 MG/1
40 TABLET, DELAYED RELEASE ORAL
Refills: 0 | Status: DISCONTINUED | OUTPATIENT
Start: 2019-08-29 | End: 2019-09-05

## 2019-08-29 RX ORDER — SODIUM CHLORIDE 9 MG/ML
1000 INJECTION, SOLUTION INTRAVENOUS
Refills: 0 | Status: DISCONTINUED | OUTPATIENT
Start: 2019-08-29 | End: 2019-08-29

## 2019-08-29 RX ORDER — ACETAMINOPHEN 500 MG
650 TABLET ORAL ONCE
Refills: 0 | Status: DISCONTINUED | OUTPATIENT
Start: 2019-08-29 | End: 2019-08-29

## 2019-08-29 RX ORDER — SENNA PLUS 8.6 MG/1
2 TABLET ORAL DAILY
Refills: 0 | Status: DISCONTINUED | OUTPATIENT
Start: 2019-08-29 | End: 2019-09-05

## 2019-08-29 RX ORDER — ONDANSETRON 8 MG/1
4 TABLET, FILM COATED ORAL EVERY 6 HOURS
Refills: 0 | Status: DISCONTINUED | OUTPATIENT
Start: 2019-08-29 | End: 2019-09-05

## 2019-08-29 RX ORDER — MORPHINE SULFATE 50 MG/1
4 CAPSULE, EXTENDED RELEASE ORAL
Refills: 0 | Status: DISCONTINUED | OUTPATIENT
Start: 2019-08-29 | End: 2019-08-29

## 2019-08-29 RX ORDER — MECLIZINE HCL 12.5 MG
25 TABLET ORAL EVERY 8 HOURS
Refills: 0 | Status: DISCONTINUED | OUTPATIENT
Start: 2019-08-29 | End: 2019-09-05

## 2019-08-29 RX ADMIN — OXYCODONE AND ACETAMINOPHEN 1 TABLET(S): 5; 325 TABLET ORAL at 22:48

## 2019-08-29 RX ADMIN — OXYCODONE AND ACETAMINOPHEN 1 TABLET(S): 5; 325 TABLET ORAL at 22:18

## 2019-08-29 RX ADMIN — Medication 25 GRAM(S): at 19:06

## 2019-08-29 RX ADMIN — SODIUM CHLORIDE 100 MILLILITER(S): 9 INJECTION INTRAMUSCULAR; INTRAVENOUS; SUBCUTANEOUS at 22:56

## 2019-08-29 RX ADMIN — SODIUM CHLORIDE 75 MILLILITER(S): 9 INJECTION INTRAMUSCULAR; INTRAVENOUS; SUBCUTANEOUS at 17:15

## 2019-08-29 RX ADMIN — MORPHINE SULFATE 4 MILLIGRAM(S): 50 CAPSULE, EXTENDED RELEASE ORAL at 17:24

## 2019-08-29 RX ADMIN — BENZOCAINE AND MENTHOL 1 LOZENGE: 5; 1 LIQUID ORAL at 18:30

## 2019-08-29 RX ADMIN — Medication 25 MILLIGRAM(S): at 05:12

## 2019-08-29 RX ADMIN — MORPHINE SULFATE 4 MILLIGRAM(S): 50 CAPSULE, EXTENDED RELEASE ORAL at 18:15

## 2019-08-29 RX ADMIN — ATORVASTATIN CALCIUM 40 MILLIGRAM(S): 80 TABLET, FILM COATED ORAL at 22:56

## 2019-08-29 RX ADMIN — LISINOPRIL 20 MILLIGRAM(S): 2.5 TABLET ORAL at 05:12

## 2019-08-29 RX ADMIN — Medication 1 SPRAY(S): at 19:49

## 2019-08-29 RX ADMIN — SODIUM CHLORIDE 100 MILLILITER(S): 9 INJECTION INTRAMUSCULAR; INTRAVENOUS; SUBCUTANEOUS at 19:06

## 2019-08-29 NOTE — CHART NOTE - NSCHARTNOTEFT_GEN_A_CORE
Patient's images are reviewed and shows no acute strokes on MRI. There are no current contraindications to undergo Left CEA from neurology stand point      Discussed with neuroattending Dr. Jared Pizano.

## 2019-08-29 NOTE — CONSULT NOTE ADULT - SUBJECTIVE AND OBJECTIVE BOX
SICU Consultation Note  ======================================================================================================  MARCO A LAGUNA  [id number]    78y Female  known pmh HTN, DLD, CAD s/p 2 stents, is currently s/p left cea for symptomatic carotid stenosis. Patient presented on     Presented on 8/22/19 with c/o dizziness for 2 days. W/u CTH negative, MR Head Angio showed incidental left ICA stenosis of 60-69%         MR Angio Neck w/ IV Cont (08.23.19 @ 16:40)   IMPRESSION:   1.  Short 0.5 cm segment of moderate (60-69%) stenosis of the proximal left internal carotid artery.  2.  Calcified atheromatous plaque proximal right ICA with no significant stenosis.  3.  Dominant left vertebral artery.    MR Angio Head No Cont (08.23.19 @ 16:39) >  IMPRESSION:   Unremarkable MRA of the brain without contrast.    CT Head No Cont (08.22.19 @ 21:39)   IMPRESSION:  No CT evidence of acute intracranial pathology. Chronic microvascular ischemic changes.    MR Head No Cont (08.23.19 @ 16:39) >  IMPRESSION:  1.  Periventricular and subcortical white matter chronic small vessel ischemic changes and multiple old lacunar infarcts as described above.  2.  No acute infarcts or intracranial hemorrhage.          Procedure: s/p left cea, symptomatic  OR Stats  OR Time: 2hrs 12 mins     EBL:  50cc         IV Fluids: 1500cc       Blood Products:   None             UOP:    None, no ambriz  Findings-carotid stenosis    PMH  PAST MEDICAL & SURGICAL HISTORY:  Cataract  CAD (coronary artery disease)  HLD (hyperlipidemia)  HTN (hypertension)  H/O ovarian cystectomy  H/O hernia repair  History of tonsillectomy  History of cholecystectomy  History of surgery: cardiac stents x2      Home Meds:  Home Medications:  aspirin 81 mg oral delayed release tablet: 1 tab(s) orally once a day (23 Aug 2019 04:49)  Lipitor:  (23 Aug 2019 04:49)  lisinopril 20 mg oral tablet: 1 tab(s) orally once a day (23 Aug 2019 04:49)  metoprolol:  (23 Aug 2019 04:49)  Plavix:  (23 Aug 2019 04:49)          Allergies   Allergies    No Known Allergies    Intolerances          Current Medications:   acetaminophen   Tablet .. 650 milliGRAM(s) Oral every 6 hours PRN  acetaminophen   Tablet .. 650 milliGRAM(s) Oral once PRN  atorvastatin 40 milliGRAM(s) Oral at bedtime  docusate sodium 100 milliGRAM(s) Oral three times a day  lactated ringers. 1000 milliLiter(s) (80 mL/Hr) IV Continuous <Continuous>  lisinopril 20 milliGRAM(s) Oral daily  meclizine 25 milliGRAM(s) Oral every 8 hours PRN  metoprolol tartrate 25 milliGRAM(s) Oral two times a day  morphine  - Injectable 4 milliGRAM(s) IV Push every 10 minutes PRN  ondansetron Injectable 4 milliGRAM(s) IV Push every 3 hours PRN  ondansetron Injectable 4 milliGRAM(s) IV Push every 6 hours PRN  oxyCODONE    5 mG/acetaminophen 325 mG 1 Tablet(s) Oral every 4 hours PRN  oxyCODONE    5 mG/acetaminophen 325 mG 1 Tablet(s) Oral once PRN  senna 2 Tablet(s) Oral daily PRN  sodium chloride 0.9%. 1000 milliLiter(s) (75 mL/Hr) IV Continuous <Continuous>      Advanced Directives: Presumed Full Code    VITAL SIGNS, INS/OUTS (Last 24hours):    ICU Vital Signs Last 24 Hrs  T(C): 37.5 (29 Aug 2019 16:45), Max: 37.5 (29 Aug 2019 16:45)  T(F): 99.5 (29 Aug 2019 16:45), Max: 99.5 (29 Aug 2019 16:45)  HR: 82 (29 Aug 2019 17:15) (61 - 90)  BP: 116/53 (29 Aug 2019 17:15) (107/56 - 129/59)  BP(mean): --  ABP: 128/53 (29 Aug 2019 17:15) (112/46 - 128/53)  ABP(mean): --  RR: 17 (29 Aug 2019 17:15) (14 - 18)  SpO2: 98% (29 Aug 2019 17:15) (96% - 98%)    I&O's Summary    28 Aug 2019 07:01  -  29 Aug 2019 07:00  --------------------------------------------------------  IN: 470 mL / OUT: 0 mL / NET: 470 mL        Height (cm): 160.02 (08-29-19)  Weight (kg): 95.3 (08-29-19)  BMI (kg/m2): 37.2 (08-29-19)  BSA (m2): 1.97 (08-29-19)    Physical Exam:   ---------------------------------------------------------------------------------------  GCS:      Exam: A&Ox3, no focal deficits    RESPIRATORY:  Lungs clear to auscultation b/l, Normal expansion/effort  Mechanical Ventilation:     CARDIOVASCULAR:   S1/S2.  RRR  No peripheral edema    GASTROINTESTINAL:  Abdomen soft, non-tender, non-distended      MUSCULOSKELETAL:  Extremities warm, pink, well-perfused.    DERM:  No skin breakdown     :   Exam: Ambriz catheter in place.       Tubes/Lines/Drains   ----------------------------------------------------------------------------------------------------------  [x] Peripheral IV  [X] Central Venous Line          IJ/Femoral             Date Placed:    [X] Arterial Line		      Radial/Femoral    Date Placed:   [X] PICC:         	  [X] Midline		                                  Date Placed:   [X] Urinary Catheter Ambriz                                             Date Placed:       LABS  --------------------------------------------------------------------------------------  LFTs  LIVER FUNCTIONS - ( 29 Aug 2019 06:03 )  Alb: 3.7 g/dL / Pro: 6.5 g/dL / ALK PHOS: 110 U/L / ALT: 31 U/L / AST: 38 U/L / GGT: x             Cardiac Markers        Coagulation  PT/INR - ( 29 Aug 2019 06:03 )   PT: 12.20 sec;   INR: 1.06 ratio         PTT - ( 29 Aug 2019 06:03 )  PTT:29.4 sec    Arterial Blood Gas      Blood Sugar  CAPILLARY BLOOD GLUCOSE          Urinalysis      Cultures            CT/XRAY/ECHO/TCD/EEG  ----------------------------------------------------------------------------------------------          --------------------------------------------------------------------------------------  Admit Diagnosis: ATAXIA     Critical Care Diagnoses: SICU Consultation Note  ======================================================================================================  MARCO A LAGUNA  [id number]    78y Female  known pmh HTN, DLD, CAD s/p 2 stents (plavix, asa), is currently s/p left cea for symptomatic carotid stenosis. Patient presented on 8/22/19 with c/o dizziness for 2 days. Patient states when she arose from bed she was dizzy. W/u CTH negative, MR Head Angio showed incidental left ICA stenosis of 60-69%. Patient was admitted to medicine and being optimized for todays procedure. Echo shows normal EF, stress tests negative for ischemia. Patient cleared by Dr. Carmona (cardiology) for CEA procedure.   Patient tolerated the procedure well, extubated and arielle to PACU. Upon intial observation patient in no acute stress, VSS.    SICU consulted for q1 neurovascular checks.       IMAGING  MR Angio Neck w/ IV Cont (08.23.19 @ 16:40)   IMPRESSION:   1.  Short 0.5 cm segment of moderate (60-69%) stenosis of the proximal left internal carotid artery.  2.  Calcified atheromatous plaque proximal right ICA with no significant stenosis.  3.  Dominant left vertebral artery.    MR Angio Head No Cont (08.23.19 @ 16:39) >  IMPRESSION:   Unremarkable MRA of the brain without contrast.    CT Head No Cont (08.22.19 @ 21:39)   IMPRESSION:  No CT evidence of acute intracranial pathology. Chronic microvascular ischemic changes.    MR Head No Cont (08.23.19 @ 16:39) >  IMPRESSION:  1.  Periventricular and subcortical white matter chronic small vessel ischemic changes and multiple old lacunar infarcts as described above.  2.  No acute infarcts or intracranial hemorrhage.      Transthoracic Echocardiogram (08.24.19 @ 13:20) >   1. Left ventricular ejection fraction, by visual estimation, is 60 to 65%.   2. Normal global left ventricular systolic function.   3. Sclerotic aortic valve with normal opening.   4. No evidence of patent foramen ovale.      NM Nuclear Stress Pharmacologic Multiple (08.28.19 @ 16:57) >  1. NORMAL ADENOSINE / REST MYOCARDIAL PERFUSION SPECT TOMOGRAPHY, WITH NO   EVIDENCE FOR ISCHEMIA DURING ADENOSINE INFUSION.   2. NORMAL RESTING LEFT VENTRICULARWALL MOTION AND WALL THICKENING.  3. LEFT VENTRICULAR EJECTION FRACTION OF  80 % WHICH IS WITHIN RANGE OF   NORMAL.             Procedure: s/p left cea, symptomatic  OR Stats  OR Time: 2hrs 12 mins     EBL:  50cc         IV Fluids: 1500cc       Blood Products:   None             UOP:    None, no ambriz  Findings-carotid stenosis    PMH  PAST MEDICAL & SURGICAL HISTORY:  Cataract  CAD (coronary artery disease)  HLD (hyperlipidemia)  HTN (hypertension)  H/O ovarian cystectomy  H/O hernia repair  History of tonsillectomy  History of cholecystectomy  History of surgery: cardiac stents x2      Home Meds:  Home Medications:  aspirin 81 mg oral delayed release tablet: 1 tab(s) orally once a day (23 Aug 2019 04:49)  Lipitor:  (23 Aug 2019 04:49)  lisinopril 20 mg oral tablet: 1 tab(s) orally once a day (23 Aug 2019 04:49)  metoprolol:  (23 Aug 2019 04:49)  Plavix:  (23 Aug 2019 04:49)          Allergies   Allergies    No Known Allergies    Intolerances          Current Medications:   acetaminophen   Tablet .. 650 milliGRAM(s) Oral every 6 hours PRN  acetaminophen   Tablet .. 650 milliGRAM(s) Oral once PRN  atorvastatin 40 milliGRAM(s) Oral at bedtime  docusate sodium 100 milliGRAM(s) Oral three times a day  lactated ringers. 1000 milliLiter(s) (80 mL/Hr) IV Continuous <Continuous>  lisinopril 20 milliGRAM(s) Oral daily  meclizine 25 milliGRAM(s) Oral every 8 hours PRN  metoprolol tartrate 25 milliGRAM(s) Oral two times a day  morphine  - Injectable 4 milliGRAM(s) IV Push every 10 minutes PRN  ondansetron Injectable 4 milliGRAM(s) IV Push every 3 hours PRN  ondansetron Injectable 4 milliGRAM(s) IV Push every 6 hours PRN  oxyCODONE    5 mG/acetaminophen 325 mG 1 Tablet(s) Oral every 4 hours PRN  oxyCODONE    5 mG/acetaminophen 325 mG 1 Tablet(s) Oral once PRN  senna 2 Tablet(s) Oral daily PRN  sodium chloride 0.9%. 1000 milliLiter(s) (75 mL/Hr) IV Continuous <Continuous>      Advanced Directives: Presumed Full Code    VITAL SIGNS, INS/OUTS (Last 24hours):    ICU Vital Signs Last 24 Hrs  T(C): 37.5 (29 Aug 2019 16:45), Max: 37.5 (29 Aug 2019 16:45)  T(F): 99.5 (29 Aug 2019 16:45), Max: 99.5 (29 Aug 2019 16:45)  HR: 82 (29 Aug 2019 17:15) (61 - 90)  BP: 116/53 (29 Aug 2019 17:15) (107/56 - 129/59)  BP(mean): --  ABP: 128/53 (29 Aug 2019 17:15) (112/46 - 128/53)  ABP(mean): --  RR: 17 (29 Aug 2019 17:15) (14 - 18)  SpO2: 98% (29 Aug 2019 17:15) (96% - 98%)    I&O's Summary    28 Aug 2019 07:01  -  29 Aug 2019 07:00  --------------------------------------------------------  IN: 470 mL / OUT: 0 mL / NET: 470 mL        Height (cm): 160.02 (08-29-19)  Weight (kg): 95.3 (08-29-19)  BMI (kg/m2): 37.2 (08-29-19)  BSA (m2): 1.97 (08-29-19)    Physical Exam:   ---------------------------------------------------------------------------------------  GCS:      Exam: A&Ox3, no focal deficits    RESPIRATORY:  Lungs clear to auscultation b/l, Normal expansion/effort  Mechanical Ventilation:     CARDIOVASCULAR:   S1/S2.  RRR  No peripheral edema    GASTROINTESTINAL:  Abdomen soft, non-tender, non-distended      MUSCULOSKELETAL:  Extremities warm, pink, well-perfused.    DERM:  No skin breakdown     :   Exam: Ambriz catheter in place.       Tubes/Lines/Drains   ----------------------------------------------------------------------------------------------------------  [x] Peripheral IV  [X] Central Venous Line          IJ/Femoral             Date Placed:    [X] Arterial Line		      Radial/Femoral    Date Placed:   [X] PICC:         	  [X] Midline		                                  Date Placed:   [X] Urinary Catheter Ambriz                                             Date Placed:       LABS  --------------------------------------------------------------------------------------  LFTs  LIVER FUNCTIONS - ( 29 Aug 2019 06:03 )  Alb: 3.7 g/dL / Pro: 6.5 g/dL / ALK PHOS: 110 U/L / ALT: 31 U/L / AST: 38 U/L / GGT: x             Cardiac Markers        Coagulation  PT/INR - ( 29 Aug 2019 06:03 )   PT: 12.20 sec;   INR: 1.06 ratio         PTT - ( 29 Aug 2019 06:03 )  PTT:29.4 sec    Arterial Blood Gas      Blood Sugar  CAPILLARY BLOOD GLUCOSE          Urinalysis      Cultures            CT/XRAY/ECHO/TCD/EEG  ----------------------------------------------------------------------------------------------          --------------------------------------------------------------------------------------  Admit Diagnosis: ATAXIA     Critical Care Diagnoses: SICU Consultation Note  ======================================================================================================  MARCO A LAGUNA  MRN-[id number]    78y Female  known pmh HTN, DLD, CAD s/p 2 stents (plavix, asa), is currently s/p left cea for symptomatic carotid stenosis. Patient presented on 8/22/19 with c/o dizziness for 2 days. Patient states when she arose from bed she was dizzy. W/u CTH negative, MR Head Angio showed incidental left ICA stenosis of 60-69%. Patient was admitted to medicine and being optimized for todays procedure. Echo shows normal EF, stress tests negative for ischemia. Patient cleared by Dr. Carmona (cardiology) for CEA procedure.   Patient tolerated the procedure well, extubated and arielle to PACU. Upon intial observation patient in no acute stress, VSS.    SICU consulted for q1 neurovascular checks.       IMAGING  MR Angio Neck w/ IV Cont (08.23.19 @ 16:40)   IMPRESSION:   1.  Short 0.5 cm segment of moderate (60-69%) stenosis of the proximal left internal carotid artery.  2.  Calcified atheromatous plaque proximal right ICA with no significant stenosis.  3.  Dominant left vertebral artery.    MR Angio Head No Cont (08.23.19 @ 16:39) >  IMPRESSION:   Unremarkable MRA of the brain without contrast.    CT Head No Cont (08.22.19 @ 21:39)   IMPRESSION:  No CT evidence of acute intracranial pathology. Chronic microvascular ischemic changes.    MR Head No Cont (08.23.19 @ 16:39) >  IMPRESSION:  1.  Periventricular and subcortical white matter chronic small vessel ischemic changes and multiple old lacunar infarcts as described above.  2.  No acute infarcts or intracranial hemorrhage.      Transthoracic Echocardiogram (08.24.19 @ 13:20) >   1. Left ventricular ejection fraction, by visual estimation, is 60 to 65%.   2. Normal global left ventricular systolic function.   3. Sclerotic aortic valve with normal opening.   4. No evidence of patent foramen ovale.      NM Nuclear Stress Pharmacologic Multiple (08.28.19 @ 16:57) >  1. NORMAL ADENOSINE / REST MYOCARDIAL PERFUSION SPECT TOMOGRAPHY, WITH NO   EVIDENCE FOR ISCHEMIA DURING ADENOSINE INFUSION.   2. NORMAL RESTING LEFT VENTRICULARWALL MOTION AND WALL THICKENING.  3. LEFT VENTRICULAR EJECTION FRACTION OF  80 % WHICH IS WITHIN RANGE OF   NORMAL.             Procedure: s/p left cea, symptomatic  OR Stats  OR Time: 2hrs 12 mins     EBL:  50cc         IV Fluids: 1500cc       Blood Products:   None             UOP:    None, no ambriz  Findings-carotid stenosis    PMH  PAST MEDICAL & SURGICAL HISTORY:  Cataract  CAD (coronary artery disease)  HLD (hyperlipidemia)  HTN (hypertension)  H/O ovarian cystectomy  H/O hernia repair  History of tonsillectomy  History of cholecystectomy  History of surgery: cardiac stents x2      Home Meds:  Home Medications:  aspirin 81 mg oral delayed release tablet: 1 tab(s) orally once a day (23 Aug 2019 04:49)  Lipitor:  (23 Aug 2019 04:49)  lisinopril 20 mg oral tablet: 1 tab(s) orally once a day (23 Aug 2019 04:49)  metoprolol:  (23 Aug 2019 04:49)  Plavix:  (23 Aug 2019 04:49)          Allergies   Allergies    No Known Allergies    Intolerances          Current Medications:   acetaminophen   Tablet .. 650 milliGRAM(s) Oral every 6 hours PRN  acetaminophen   Tablet .. 650 milliGRAM(s) Oral once PRN  atorvastatin 40 milliGRAM(s) Oral at bedtime  docusate sodium 100 milliGRAM(s) Oral three times a day  lactated ringers. 1000 milliLiter(s) (80 mL/Hr) IV Continuous <Continuous>  lisinopril 20 milliGRAM(s) Oral daily  meclizine 25 milliGRAM(s) Oral every 8 hours PRN  metoprolol tartrate 25 milliGRAM(s) Oral two times a day  morphine  - Injectable 4 milliGRAM(s) IV Push every 10 minutes PRN  ondansetron Injectable 4 milliGRAM(s) IV Push every 3 hours PRN  ondansetron Injectable 4 milliGRAM(s) IV Push every 6 hours PRN  oxyCODONE    5 mG/acetaminophen 325 mG 1 Tablet(s) Oral every 4 hours PRN  oxyCODONE    5 mG/acetaminophen 325 mG 1 Tablet(s) Oral once PRN  senna 2 Tablet(s) Oral daily PRN  sodium chloride 0.9%. 1000 milliLiter(s) (75 mL/Hr) IV Continuous <Continuous>      Advanced Directives: Presumed Full Code    VITAL SIGNS, INS/OUTS (Last 24hours):    ICU Vital Signs Last 24 Hrs  T(C): 37.5 (29 Aug 2019 16:45), Max: 37.5 (29 Aug 2019 16:45)  T(F): 99.5 (29 Aug 2019 16:45), Max: 99.5 (29 Aug 2019 16:45)  HR: 82 (29 Aug 2019 17:15) (61 - 90)  BP: 116/53 (29 Aug 2019 17:15) (107/56 - 129/59)  BP(mean): --  ABP: 128/53 (29 Aug 2019 17:15) (112/46 - 128/53)  ABP(mean): --  RR: 17 (29 Aug 2019 17:15) (14 - 18)  SpO2: 98% (29 Aug 2019 17:15) (96% - 98%)    I&O's Summary    28 Aug 2019 07:01  -  29 Aug 2019 07:00  --------------------------------------------------------  IN: 470 mL / OUT: 0 mL / NET: 470 mL        Height (cm): 160.02 (08-29-19)  Weight (kg): 95.3 (08-29-19)  BMI (kg/m2): 37.2 (08-29-19)  BSA (m2): 1.97 (08-29-19)    Physical Exam:   ---------------------------------------------------------------------------------------  GCS:    15  Exam: A&Ox3, no focal deficits    RESPIRATORY:  Lungs clear to auscultation b/l, Normal expansion/effort      CARDIOVASCULAR:   S1/S2.  RRR    GASTROINTESTINAL:  Abdomen soft, non-tender, non-distended    MUSCULOSKELETAL:  Extremities warm, pink, well-perfused.    DERM:  No skin breakdown     :   no ambriz    Tubes/Lines/Drains   ----------------------------------------------------------------------------------------------------------  [x] Peripheral IV  Arterial line    LABS  --------------------------------------------------------------------------------------  LFTs    Pending    LIVER FUNCTIONS - ( 29 Aug 2019 06:03 )  Alb: 3.7 g/dL / Pro: 6.5 g/dL / ALK PHOS: 110 U/L / ALT: 31 U/L / AST: 38 U/L / GGT: x             Cardiac Markers        Coagulation  PT/INR - ( 29 Aug 2019 06:03 )   PT: 12.20 sec;   INR: 1.06 ratio         PTT - ( 29 Aug 2019 06:03 )  PTT:29.4 sec    Arterial Blood Gas      Blood Sugar  CAPILLARY BLOOD GLUCOSE          Urinalysis      Cultures            CT/XRAY/ECHO/TCD/EEG  ----------------------------------------------------------------------------------------------          --------------------------------------------------------------------------------------  Admit Diagnosis: ATAXIA     Critical Care Diagnoses: SICU Consultation Note  ======================================================================================================  MARCO A LAGUNA  MRN-[id number]    78y Female  known pmh HTN, DLD, CAD s/p 2 stents (plavix, asa), is currently s/p left cea for symptomatic carotid stenosis. Patient presented on 8/22/19 with c/o dizziness for 2 days. Patient states when she arose from bed she was dizzy. W/u CTH negative, MR Head Angio showed incidental left ICA stenosis of 60-69%. Patient was admitted to medicine and being optimized for todays procedure. Echo shows normal EF, stress tests negative for ischemia. Patient cleared by Dr. Carmona (cardiology) for CEA procedure.   Patient tolerated the procedure well, extubated and arielle to PACU. Upon intial observation patient in no acute stress, VSS.    SICU consulted for q1 neurovascular checks.       IMAGING  MR Angio Neck w/ IV Cont (08.23.19 @ 16:40)   IMPRESSION:   1.  Short 0.5 cm segment of moderate (60-69%) stenosis of the proximal left internal carotid artery.  2.  Calcified atheromatous plaque proximal right ICA with no significant stenosis.  3.  Dominant left vertebral artery.    MR Angio Head No Cont (08.23.19 @ 16:39) >  IMPRESSION:   Unremarkable MRA of the brain without contrast.    CT Head No Cont (08.22.19 @ 21:39)   IMPRESSION:  No CT evidence of acute intracranial pathology. Chronic microvascular ischemic changes.    MR Head No Cont (08.23.19 @ 16:39) >  IMPRESSION:  1.  Periventricular and subcortical white matter chronic small vessel ischemic changes and multiple old lacunar infarcts as described above.  2.  No acute infarcts or intracranial hemorrhage.      Transthoracic Echocardiogram (08.24.19 @ 13:20) >   1. Left ventricular ejection fraction, by visual estimation, is 60 to 65%.   2. Normal global left ventricular systolic function.   3. Sclerotic aortic valve with normal opening.   4. No evidence of patent foramen ovale.      NM Nuclear Stress Pharmacologic Multiple (08.28.19 @ 16:57) >  1. NORMAL ADENOSINE / REST MYOCARDIAL PERFUSION SPECT TOMOGRAPHY, WITH NO   EVIDENCE FOR ISCHEMIA DURING ADENOSINE INFUSION.   2. NORMAL RESTING LEFT VENTRICULARWALL MOTION AND WALL THICKENING.  3. LEFT VENTRICULAR EJECTION FRACTION OF  80 % WHICH IS WITHIN RANGE OF   NORMAL.             Procedure: s/p left cea, symptomatic  OR Stats  OR Time: 2hrs 12 mins     EBL:  50cc         IV Fluids: 1500cc       Blood Products:   None             UOP:    None, no ambriz  Findings-carotid stenosis    PMH  PAST MEDICAL & SURGICAL HISTORY:  Cataract  CAD (coronary artery disease)  HLD (hyperlipidemia)  HTN (hypertension)  H/O ovarian cystectomy  H/O hernia repair  History of tonsillectomy  History of cholecystectomy  History of surgery: cardiac stents x2      Home Meds:  Home Medications:  aspirin 81 mg oral delayed release tablet: 1 tab(s) orally once a day (23 Aug 2019 04:49)  Lipitor:  (23 Aug 2019 04:49)  lisinopril 20 mg oral tablet: 1 tab(s) orally once a day (23 Aug 2019 04:49)  metoprolol:  (23 Aug 2019 04:49)  Plavix:  (23 Aug 2019 04:49)          Allergies   Allergies    No Known Allergies    Intolerances          Current Medications:   acetaminophen   Tablet .. 650 milliGRAM(s) Oral every 6 hours PRN  acetaminophen   Tablet .. 650 milliGRAM(s) Oral once PRN  atorvastatin 40 milliGRAM(s) Oral at bedtime  docusate sodium 100 milliGRAM(s) Oral three times a day  lactated ringers. 1000 milliLiter(s) (80 mL/Hr) IV Continuous <Continuous>  lisinopril 20 milliGRAM(s) Oral daily  meclizine 25 milliGRAM(s) Oral every 8 hours PRN  metoprolol tartrate 25 milliGRAM(s) Oral two times a day  morphine  - Injectable 4 milliGRAM(s) IV Push every 10 minutes PRN  ondansetron Injectable 4 milliGRAM(s) IV Push every 3 hours PRN  ondansetron Injectable 4 milliGRAM(s) IV Push every 6 hours PRN  oxyCODONE    5 mG/acetaminophen 325 mG 1 Tablet(s) Oral every 4 hours PRN  oxyCODONE    5 mG/acetaminophen 325 mG 1 Tablet(s) Oral once PRN  senna 2 Tablet(s) Oral daily PRN  sodium chloride 0.9%. 1000 milliLiter(s) (75 mL/Hr) IV Continuous <Continuous>      Advanced Directives: Presumed Full Code    VITAL SIGNS, INS/OUTS (Last 24hours):    ICU Vital Signs Last 24 Hrs  T(C): 37.5 (29 Aug 2019 16:45), Max: 37.5 (29 Aug 2019 16:45)  T(F): 99.5 (29 Aug 2019 16:45), Max: 99.5 (29 Aug 2019 16:45)  HR: 82 (29 Aug 2019 17:15) (61 - 90)  BP: 116/53 (29 Aug 2019 17:15) (107/56 - 129/59)  BP(mean): --  ABP: 128/53 (29 Aug 2019 17:15) (112/46 - 128/53)  ABP(mean): --  RR: 17 (29 Aug 2019 17:15) (14 - 18)  SpO2: 98% (29 Aug 2019 17:15) (96% - 98%)    I&O's Summary    28 Aug 2019 07:01  -  29 Aug 2019 07:00  --------------------------------------------------------  IN: 470 mL / OUT: 0 mL / NET: 470 mL        Height (cm): 160.02 (08-29-19)  Weight (kg): 95.3 (08-29-19)  BMI (kg/m2): 37.2 (08-29-19)  BSA (m2): 1.97 (08-29-19)    Physical Exam:   ---------------------------------------------------------------------------------------  GCS:    15  Exam: A&Ox3, no focal deficits  left carotid incision site c/d/i, soft no hematoma     RESPIRATORY:  Lungs clear to auscultation b/l, Normal expansion/effort      CARDIOVASCULAR:   S1/S2.  RRR    GASTROINTESTINAL:  Abdomen soft, non-tender, non-distended    MUSCULOSKELETAL:  Extremities warm, pink, well-perfused.    DERM:  No skin breakdown     :   no ambriz    Tubes/Lines/Drains   ----------------------------------------------------------------------------------------------------------  [x] Peripheral IV  Arterial line    LABS  --------------------------------------------------------------------------------------  LFTs    Pending    LIVER FUNCTIONS - ( 29 Aug 2019 06:03 )  Alb: 3.7 g/dL / Pro: 6.5 g/dL / ALK PHOS: 110 U/L / ALT: 31 U/L / AST: 38 U/L / GGT: x             Cardiac Markers        Coagulation  PT/INR - ( 29 Aug 2019 06:03 )   PT: 12.20 sec;   INR: 1.06 ratio         PTT - ( 29 Aug 2019 06:03 )  PTT:29.4 sec    Arterial Blood Gas      Blood Sugar  CAPILLARY BLOOD GLUCOSE          Urinalysis      Cultures            CT/XRAY/ECHO/TCD/EEG  ----------------------------------------------------------------------------------------------          --------------------------------------------------------------------------------------  Admit Diagnosis: ATAXIA     Critical Care Diagnoses:

## 2019-08-29 NOTE — CONSULT NOTE ADULT - ATTENDING COMMENTS
Left ICA focal stenosis on MRA with >80% stenosis on duplex velocities. Had episodes of vertigo, no resolved. She is a good candidate for left CEA based on anatomy. D/w Neurology attending, patient and daughter. Will plan Left CEA this week. Please obtain cardiology eval.
78y Female  s/p left cea, symptomatic    NEURO:     Pain-controlled with APAP, oxy    s/p left cea-q1 neurovascular checks, strict BP control    ataxia-meclizine    RESP:     Oxygen insufficiency-wean off NC to RA as tolerate      CARDS:     hx HTN-restarted metoprolol, lisinopril    Hx of CAD s/p stents-holding plavix, asa as per vasc, will restart 8/30 AM    Hx of DLD-restarted atorvastatin    Labs-cardiac enzymesx3 ordered, f/u results     Diagnostics-Echo 8/24, NM study 8/28, normal EF, no ishchemia      GI/NUTR:     Diet, Clear Liquid-adance 8/30 AM    GI Prophylaxis-PPI     Bowel regimen-docusate, senna    /RENAL:     Strict I/O-no ambriz, TOV at midnight    Lytes-replete PRN, no JESIKA      HEME/ONC:     DVT prophylaxis-hsq to restart 8/30     hx of CAD-holding plavix    Acute anemia-11/33, continue to trend, monitor for signs of bleeding    ID:     Antibiotics-none as per primary team, received 2g ancef intraop     ENDO:    low thyroid levels-will require outpatient endocrine f/u    no dx of DM- Glucose, Serum trend, start ISS if evelvated >150     LINES/DRAINS: Natali RIZVI
pt is seen and examined by me personally

## 2019-08-29 NOTE — CHART NOTE - NSCHARTNOTEFT_GEN_A_CORE
Spoke with Dr. Carmona regarding cardiology clearance for vascular procedure today. As per Dr. Carmona, the patient is cleared to go for the procedure. Spoke with Dr. Carmona regarding cardiology clearance for vascular procedure today. As per Dr. Carmona, the patient is moderate risk for a high risk procedure, given unremarkable results of stress test.

## 2019-08-29 NOTE — PACU DISCHARGE NOTE - COMMENTS
Pt tolerated procedure  Perioperative course uneventful  No obvious anesthesia related issues  Moving all extremities following commands  Pt endorsed to SICU team

## 2019-08-29 NOTE — CONSULT NOTE ADULT - ASSESSMENT
Assessment & Plan    78y Female  s/p left cea, symptomatic    NEURO:     Pain-controlled with APAP, oxy    s/p left cea-q1 neurovascular checks, strict BP control    ataxia-    RESP:     Oxygen insufficiency-wean off NC to RA as tolerate      CARDS:     hx HTN-restarted metoprolol, lisinopril    Hx of CAD s/p stents-holding plavix, asa as per vasc, will restart 8/30 AM    Hx of DLD-restarted atorvastatin    Labs-cardiac enzymesx3 ordered, f/u results     Diagnostics-Echo 8/24, NM study 8/28, normal EF, no ishchemia      GI/NUTR:     Diet, Clear Liquid-adance 8/30 AM    GI Prophylaxis-PPI     Bowel regimen-docusate, senna    /RENAL:     Strict I/O-no ambriz, TOV at midnight    Lytes-replete PRN, no JESIKA      HEME/ONC:     DVT prophylaxis-hsq to restart 8/30     hx of CAD-holding plavix    Acute anemia-11/33, continue to trend, monitor for signs of bleeding    ID:     Antibiotics-none as per primary team, received 2g ancef intraop     ENDO:    low thyroid levels-will require outpatient endocrine f/u    no dx of DM- Glucose, Serum trend, start ISS if evelvated >150     LINES/DRAINS: Natali RIZVI    DISPO: SICU d/w Dr. Arora Assessment & Plan    78y Female  s/p left cea, symptomatic    NEURO:     Pain-controlled with APAP, oxy    s/p left cea-q1 neurovascular checks, strict BP control    ataxia-meclizine    RESP:     Oxygen insufficiency-wean off NC to RA as tolerate      CARDS:     hx HTN-restarted metoprolol, lisinopril    Hx of CAD s/p stents-holding plavix, asa as per vasc, will restart 8/30 AM    Hx of DLD-restarted atorvastatin    Labs-cardiac enzymesx3 ordered, f/u results     Diagnostics-Echo 8/24, NM study 8/28, normal EF, no ishchemia      GI/NUTR:     Diet, Clear Liquid-adance 8/30 AM    GI Prophylaxis-PPI     Bowel regimen-docusate, senna    /RENAL:     Strict I/O-no ambriz, TOV at midnight    Lytes-replete PRN, no JESIKA      HEME/ONC:     DVT prophylaxis-hsq to restart 8/30     hx of CAD-holding plavix    Acute anemia-11/33, continue to trend, monitor for signs of bleeding    ID:     Antibiotics-none as per primary team, received 2g ancef intraop     ENDO:    low thyroid levels-will require outpatient endocrine f/u    no dx of DM- Glucose, Serum trend, start ISS if evelvated >150     LINES/DRAINS: Natali RIZVI    DISPO: SICU d/w Dr. Arora

## 2019-08-29 NOTE — PROGRESS NOTE ADULT - SUBJECTIVE AND OBJECTIVE BOX
HPI  Patient is a 78y old Female who presents with a chief complaint of vertigo, ataxia (28 Aug 2019 13:42)    Currently admitted to medicine with the primary diagnosis of Ataxia     Today is hospital day 6d.     INTERVAL HPI / OVERNIGHT EVENTS:  Patient was examined and seen at bedside. This morning she is resting comfortably in bed and reports no new issues or overnight events. Asking about when her procedure is going to happen today. Otherwise comfortable on room air.     ROS: Otherwise unremarkable     PAST MEDICAL & SURGICAL HISTORY  Cataract  CAD (coronary artery disease)  HLD (hyperlipidemia)  HTN (hypertension)  H/O ovarian cystectomy  H/O hernia repair  History of tonsillectomy  History of cholecystectomy  History of surgery: cardiac stents x2    ALLERGIES  No Known Allergies    MEDICATIONS  STANDING MEDICATIONS  aspirin  chewable 162 milliGRAM(s) Oral daily  atorvastatin 40 milliGRAM(s) Oral at bedtime  chlorhexidine 4% Liquid 1 Application(s) Topical two times a day  clopidogrel Tablet 75 milliGRAM(s) Oral daily  dextrose 5% + sodium chloride 0.45%. 1000 milliLiter(s) IV Continuous <Continuous>  lisinopril 20 milliGRAM(s) Oral daily  metoprolol tartrate 25 milliGRAM(s) Oral two times a day    PRN MEDICATIONS  acetaminophen   Tablet .. 650 milliGRAM(s) Oral every 6 hours PRN  meclizine 25 milliGRAM(s) Oral every 8 hours PRN    VITALS:  T(F): 96.9  HR: 61  BP: 129/59  RR: 18  SpO2: 98%    PHYSICAL EXAM  GEN: NAD, Resting comfortably in bed  PULM: Clear to auscultation bilaterally, No wheezes  CVS: Regular rate and rhythm, S1-S2, no murmurs  ABD: Soft, non-tender, non-distended, no guarding  EXT: No edema  NEURO: AAOx3, no focal deficits    LABS                        11.4   7.41  )-----------( 235      ( 29 Aug 2019 06:03 )             34.5     08-29    143  |  109  |  11  ----------------------------<  103<H>  4.2   |  24  |  0.6<L>    Ca    8.9      29 Aug 2019 06:03  Phos  3.4     08-28  Mg     2.1     08-28    TPro  6.5  /  Alb  3.7  /  TBili  <0.2  /  DBili  x   /  AST  38  /  ALT  31  /  AlkPhos  110  08-29    PT/INR - ( 29 Aug 2019 06:03 )   PT: 12.20 sec;   INR: 1.06 ratio         PTT - ( 29 Aug 2019 06:03 )  PTT:29.4 sec    RADIOLOGY    Impression:    20-39% stenosis of the right internal carotid artery.  >80% stenosis of the left internal carotid artery.    ICD-10: R42    < end of copied text >    < from: MR Angio Neck w/ IV Cont (08.23.19 @ 16:40) >  IMPRESSION:     1.  Short 0.5 cm segment of moderate (60-69%) stenosis of the proximal   left internal carotid artery.    2.  Calcified atheromatous plaque proximal right ICA with no significant   stenosis.    3.  Dominant left vertebral artery.      < from: NM Nuclear Stress Pharmacologic Multiple (08.28.19 @ 16:57) >  Impression:   1. NORMAL ADENOSINE / REST MYOCARDIAL PERFUSION SPECT TOMOGRAPHY, WITH NO   EVIDENCE FOR ISCHEMIA DURING ADENOSINE INFUSION.   2. NORMAL RESTING LEFT VENTRICULARWALL MOTION AND WALL THICKENING.  3. LEFT VENTRICULAR EJECTION FRACTION OF  80 % WHICH IS WITHIN RANGE OF   NORMAL.     < end of copied text >

## 2019-08-29 NOTE — PROGRESS NOTE ADULT - ASSESSMENT
78 years old female known to have HTN, DLD, CAD s/p 2 cardiac stents on Plavix and ASA, presents to ED for vertigo and ataxia x 1 day.  As Per pt, yesterday morning when she tried getting out of the bed in the morning, she suddenly started experiencing sensation of everything spinning around.  CTH did not show any acute pathology, EKG NSR and she was seen by neurology and admitted for vertigo and ataxia.    # Vertigo, ataxia  - Nuclear stress test done. Unremarkable  - OR today  - May need ENT f/u outpatient  - Neuro consult appreciated. Continuing with meclizine 25mg TID and increasing Atorvastatin to 40mg QD. Cleared.  - Carotid duplex showed >80% stenosis in LICA and 20-39% stenosis in HANANE  - Continuing secondary stroke prevention with  mg QD and Plavix   - MRI brain: no acute infarcts or intracranial hemorrhage  - Vitamin B12/folate wnl  - CTH neg for acute pathology, no focal weakness  - TSH low (0.01), T4 normal (6.5). Outpatient Endo?  - MRA neck significant for moderate stenosis (60-69%) of proximal LICA + calcified atheromatous plaque in HANANE w/o significant stenosis.  - Echo unremarkable, EF 60-65%  - Refused PT  - Fall precautions  - No evidence of CVA and Vit B12 def, pt would likely need outpt ENT eval and vestibular rehab or REEG to rule out seizure  - Discontinuing telemetry    # HTN  - c/w lisinopril 20mg QD  - c/w metoprolol 25mg BID    # CAD s/p 2 stents  - c/w Aspirin 162mg QD - can hold before procedure   - c/w Metoprolol 25mg BID  - c/w Plavix 75mg QD - can hold before procedure   - c/w Atorvastatin 40mg QD     # DLD  - c/w Atorvastatin 40mg QD    DVT PPx: Lovenox  GI PPx: not indicated  Diet: dash  Activity: fall precautions

## 2019-08-29 NOTE — PROGRESS NOTE ADULT - SUBJECTIVE AND OBJECTIVE BOX
Patient was seen and examined. Spoke with RN. Chart reviewed.    No events overnight.  Vital Signs Last 24 Hrs  T(F): 96.9 (29 Aug 2019 06:39), Max: 98.1 (28 Aug 2019 21:18)  HR: 61 (29 Aug 2019 06:39) (61 - 73)  BP: 129/59 (29 Aug 2019 06:39) (107/56 - 129/68)  SpO2: 98% (29 Aug 2019 06:39) (98% - 98%)  MEDICATIONS  (STANDING):  aspirin  chewable 162 milliGRAM(s) Oral daily  atorvastatin 40 milliGRAM(s) Oral at bedtime  chlorhexidine 4% Liquid 1 Application(s) Topical two times a day  clopidogrel Tablet 75 milliGRAM(s) Oral daily  dextrose 5% + sodium chloride 0.45%. 1000 milliLiter(s) (50 mL/Hr) IV Continuous <Continuous>  lisinopril 20 milliGRAM(s) Oral daily  metoprolol tartrate 25 milliGRAM(s) Oral two times a day    MEDICATIONS  (PRN):  acetaminophen   Tablet .. 650 milliGRAM(s) Oral every 6 hours PRN Temp greater or equal to 38C (100.4F), Mild Pain (1 - 3)  meclizine 25 milliGRAM(s) Oral every 8 hours PRN Dizziness    Labs:                        11.4   7.41  )-----------( 235      ( 29 Aug 2019 06:03 )             34.5                         11.4   6.76  )-----------( 226      ( 28 Aug 2019 06:25 )             33.9     29 Aug 2019 06:03    143    |  109    |  11     ----------------------------<  103    4.2     |  24     |  0.6    28 Aug 2019 06:25    143    |  109    |  9      ----------------------------<  100    3.9     |  24     |  0.7      Ca    8.9        29 Aug 2019 06:03  Ca    8.8        28 Aug 2019 06:25  Phos  3.4       28 Aug 2019 06:25  Mg     2.1       28 Aug 2019 06:25    TPro  6.5    /  Alb  3.7    /  TBili  <0.2   /  DBili  x      /  AST  38     /  ALT  31     /  AlkPhos  110    29 Aug 2019 06:03  TPro  6.4    /  Alb  3.5    /  TBili  0.2    /  DBili  x      /  AST  34     /  ALT  23     /  AlkPhos  100    28 Aug 2019 06:25    PT/INR - ( 29 Aug 2019 06:03 )   PT: 12.20 sec;   INR: 1.06 ratio         PTT - ( 29 Aug 2019 06:03 )  PTT:29.4 sec        Radiology:    General: comfortable, NAD  Neurology: A&Ox3, nonfocal  Head:  Normocephalic, atraumatic  ENT:  Mucosa moist, no ulcerations  Neck:  Supple, no JVD,   Skin: no breakdowns (as per RN)  Resp: CTA B/L  CV: RRR, S1S2,   GI: Soft, NT, bowel sounds  MS: No edema, + peripheral pulses, FROM all 4 extremity

## 2019-08-30 LAB
ANION GAP SERPL CALC-SCNC: 11 MMOL/L — SIGNIFICANT CHANGE UP (ref 7–14)
APTT BLD: 27.2 SEC — SIGNIFICANT CHANGE UP (ref 27–39.2)
BUN SERPL-MCNC: 11 MG/DL — SIGNIFICANT CHANGE UP (ref 10–20)
CALCIUM SERPL-MCNC: 8.4 MG/DL — LOW (ref 8.5–10.1)
CHLORIDE SERPL-SCNC: 106 MMOL/L — SIGNIFICANT CHANGE UP (ref 98–110)
CK MB CFR SERPL CALC: 1.2 NG/ML — SIGNIFICANT CHANGE UP (ref 0.6–6.3)
CK MB CFR SERPL CALC: 1.4 NG/ML — SIGNIFICANT CHANGE UP (ref 0.6–6.3)
CK SERPL-CCNC: 82 U/L — SIGNIFICANT CHANGE UP (ref 0–225)
CK SERPL-CCNC: 83 U/L — SIGNIFICANT CHANGE UP (ref 0–225)
CO2 SERPL-SCNC: 23 MMOL/L — SIGNIFICANT CHANGE UP (ref 17–32)
CREAT SERPL-MCNC: 0.7 MG/DL — SIGNIFICANT CHANGE UP (ref 0.7–1.5)
GLUCOSE SERPL-MCNC: 112 MG/DL — HIGH (ref 70–99)
HCT VFR BLD CALC: 32.6 % — LOW (ref 37–47)
HGB BLD-MCNC: 10.8 G/DL — LOW (ref 12–16)
INR BLD: 1.14 RATIO — SIGNIFICANT CHANGE UP (ref 0.65–1.3)
MAGNESIUM SERPL-MCNC: 2.4 MG/DL — SIGNIFICANT CHANGE UP (ref 1.8–2.4)
MCHC RBC-ENTMCNC: 30.8 PG — SIGNIFICANT CHANGE UP (ref 27–31)
MCHC RBC-ENTMCNC: 33.1 G/DL — SIGNIFICANT CHANGE UP (ref 32–37)
MCV RBC AUTO: 92.9 FL — SIGNIFICANT CHANGE UP (ref 81–99)
NRBC # BLD: 0 /100 WBCS — SIGNIFICANT CHANGE UP (ref 0–0)
PHOSPHATE SERPL-MCNC: 4 MG/DL — SIGNIFICANT CHANGE UP (ref 2.1–4.9)
PLATELET # BLD AUTO: 254 K/UL — SIGNIFICANT CHANGE UP (ref 130–400)
POTASSIUM SERPL-MCNC: 4.2 MMOL/L — SIGNIFICANT CHANGE UP (ref 3.5–5)
POTASSIUM SERPL-SCNC: 4.2 MMOL/L — SIGNIFICANT CHANGE UP (ref 3.5–5)
PROTHROM AB SERPL-ACNC: 13.1 SEC — HIGH (ref 9.95–12.87)
RBC # BLD: 3.51 M/UL — LOW (ref 4.2–5.4)
RBC # FLD: 13.6 % — SIGNIFICANT CHANGE UP (ref 11.5–14.5)
SODIUM SERPL-SCNC: 140 MMOL/L — SIGNIFICANT CHANGE UP (ref 135–146)
TROPONIN T SERPL-MCNC: <0.01 NG/ML — SIGNIFICANT CHANGE UP
TROPONIN T SERPL-MCNC: <0.01 NG/ML — SIGNIFICANT CHANGE UP
WBC # BLD: 12.71 K/UL — HIGH (ref 4.8–10.8)
WBC # FLD AUTO: 12.71 K/UL — HIGH (ref 4.8–10.8)

## 2019-08-30 PROCEDURE — 71045 X-RAY EXAM CHEST 1 VIEW: CPT | Mod: 26

## 2019-08-30 PROCEDURE — 99233 SBSQ HOSP IP/OBS HIGH 50: CPT

## 2019-08-30 RX ORDER — METOPROLOL TARTRATE 50 MG
25 TABLET ORAL
Refills: 0 | Status: DISCONTINUED | OUTPATIENT
Start: 2019-08-30 | End: 2019-08-30

## 2019-08-30 RX ORDER — LISINOPRIL 2.5 MG/1
20 TABLET ORAL DAILY
Refills: 0 | Status: DISCONTINUED | OUTPATIENT
Start: 2019-08-30 | End: 2019-08-30

## 2019-08-30 RX ORDER — SODIUM CHLORIDE 9 MG/ML
1000 INJECTION INTRAMUSCULAR; INTRAVENOUS; SUBCUTANEOUS
Refills: 0 | Status: DISCONTINUED | OUTPATIENT
Start: 2019-08-30 | End: 2019-08-30

## 2019-08-30 RX ORDER — IBUPROFEN 200 MG
600 TABLET ORAL ONCE
Refills: 0 | Status: COMPLETED | OUTPATIENT
Start: 2019-08-30 | End: 2019-08-30

## 2019-08-30 RX ORDER — SODIUM CHLORIDE 9 MG/ML
250 INJECTION, SOLUTION INTRAVENOUS ONCE
Refills: 0 | Status: COMPLETED | OUTPATIENT
Start: 2019-08-30 | End: 2019-08-30

## 2019-08-30 RX ADMIN — Medication 600 MILLIGRAM(S): at 05:16

## 2019-08-30 RX ADMIN — Medication 81 MILLIGRAM(S): at 11:36

## 2019-08-30 RX ADMIN — CLOPIDOGREL BISULFATE 75 MILLIGRAM(S): 75 TABLET, FILM COATED ORAL at 11:36

## 2019-08-30 RX ADMIN — PANTOPRAZOLE SODIUM 40 MILLIGRAM(S): 20 TABLET, DELAYED RELEASE ORAL at 05:21

## 2019-08-30 RX ADMIN — SODIUM CHLORIDE 1000 MILLILITER(S): 9 INJECTION, SOLUTION INTRAVENOUS at 09:18

## 2019-08-30 RX ADMIN — HEPARIN SODIUM 5000 UNIT(S): 5000 INJECTION INTRAVENOUS; SUBCUTANEOUS at 13:23

## 2019-08-30 RX ADMIN — CHLORHEXIDINE GLUCONATE 1 APPLICATION(S): 213 SOLUTION TOPICAL at 05:21

## 2019-08-30 RX ADMIN — HEPARIN SODIUM 5000 UNIT(S): 5000 INJECTION INTRAVENOUS; SUBCUTANEOUS at 22:25

## 2019-08-30 RX ADMIN — Medication 600 MILLIGRAM(S): at 10:00

## 2019-08-30 RX ADMIN — OXYCODONE AND ACETAMINOPHEN 1 TABLET(S): 5; 325 TABLET ORAL at 11:33

## 2019-08-30 RX ADMIN — Medication 650 MILLIGRAM(S): at 13:14

## 2019-08-30 RX ADMIN — ATORVASTATIN CALCIUM 40 MILLIGRAM(S): 80 TABLET, FILM COATED ORAL at 22:25

## 2019-08-30 RX ADMIN — OXYCODONE AND ACETAMINOPHEN 1 TABLET(S): 5; 325 TABLET ORAL at 12:00

## 2019-08-30 RX ADMIN — OXYCODONE AND ACETAMINOPHEN 1 TABLET(S): 5; 325 TABLET ORAL at 16:41

## 2019-08-30 RX ADMIN — OXYCODONE AND ACETAMINOPHEN 1 TABLET(S): 5; 325 TABLET ORAL at 17:18

## 2019-08-30 RX ADMIN — ONDANSETRON 4 MILLIGRAM(S): 8 TABLET, FILM COATED ORAL at 10:35

## 2019-08-30 RX ADMIN — Medication 650 MILLIGRAM(S): at 13:44

## 2019-08-30 RX ADMIN — SODIUM CHLORIDE 100 MILLILITER(S): 9 INJECTION INTRAMUSCULAR; INTRAVENOUS; SUBCUTANEOUS at 04:42

## 2019-08-30 RX ADMIN — Medication 600 MILLIGRAM(S): at 09:28

## 2019-08-30 RX ADMIN — Medication 25 MILLIGRAM(S): at 11:33

## 2019-08-30 RX ADMIN — Medication 100 MILLIGRAM(S): at 22:25

## 2019-08-30 RX ADMIN — Medication 600 MILLIGRAM(S): at 04:42

## 2019-08-30 RX ADMIN — HEPARIN SODIUM 5000 UNIT(S): 5000 INJECTION INTRAVENOUS; SUBCUTANEOUS at 05:21

## 2019-08-30 NOTE — CHART NOTE - NSCHARTNOTEFT_GEN_A_CORE
Post Operative Check    Patient is post op from a Left carotid endarterectomy (47255)   and is resting comfortably, no complaints    Vitals    T(C): 36.3 (08-30-19 @ 00:00), Max: 37.5 (08-29-19 @ 16:45)  HR: 76 (08-30-19 @ 02:00) (61 - 90)  BP: 100/50 (08-29-19 @ 20:30) (99/49 - 129/59)  RR: 14 (08-30-19 @ 02:00) (12 - 26)  SpO2: 96% (08-30-19 @ 02:00) (94% - 99%)  Wt(kg): --      08-28 @ 07:01  -  08-29 @ 07:00  --------------------------------------------------------  IN:    dextrose 5% + sodium chloride 0.45%.: 350 mL    Oral Fluid: 120 mL  Total IN: 470 mL    OUT:  Total OUT: 0 mL    Total NET: 470 mL      08-29 @ 07:01  -  08-30 @ 03:36  --------------------------------------------------------  IN:    IV PiggyBack: 50 mL    sodium chloride 0.9%.: 600 mL  Total IN: 650 mL    OUT:  Total OUT: 0 mL    Total NET: 650 mL          Physical Exam  General: NAD AAOx3   NEck: dressing c/d/i  Cards: RRR S1S2  Resp: CTAB  Ext: NTBL, palpable radial pulse    Labs  Labs:  CAPILLARY BLOOD GLUCOSE                              11.1   9.97  )-----------( 243      ( 29 Aug 2019 17:20 )             33.1         08-29    140  |  105  |  8<L>  ----------------------------<  153<H>  3.4<L>   |  22  |  0.6<L>      Magnesium, Serum: 1.7 mg/dL (08-29-19 @ 17:20)      LFTs:             6.5  | <0.2 | 38       ------------------[110     ( 29 Aug 2019 06:03 )  3.7  | x    | 31          Lipase:x      Amylase:x             Coags:     12.20  ----< 1.06    ( 29 Aug 2019 06:03 )     29.4        CARDIAC MARKERS ( 29 Aug 2019 23:30 )  x     / <0.01 ng/mL / 82 U/L / x     / 1.2 ng/mL  CARDIAC MARKERS ( 29 Aug 2019 17:20 )  x     / <0.01 ng/mL / 65 U/L / x     / <1.0 ng/mL        Radiology:       Patient is a 78y old Female s/p Left carotid endarterectomy  Plan:  - ICU care

## 2019-08-30 NOTE — DIETITIAN INITIAL EVALUATION ADULT. - ENERGY NEEDS
Energy: 8514-4689 kcal/day (MSJx1.1-1.2 AF)    Protein: 53-63 g/day (1-1.2 g/kg IBW)    Fluids: 1 mL/kcal

## 2019-08-30 NOTE — PROGRESS NOTE ADULT - ASSESSMENT
Assessment & Plan    78y Female  s/p left cea, symptomatic    NEURO:     Pain-controlled with APAP, oxy    s/p left cea-q1 neurovascular checks, strict BP control    ataxia-meclizine    RESP:     On RA, sat > 94%      CARDS:     hx HTN-restarted metoprolol, lisinopril    Hx of CAD s/p stents- restarted plavix & asa as per vascular team    Hx of DLD-restarted atorvastatin    Cardiac enzymes neg x2, f/u 3rd set @ am     Diagnostics-Echo 8/24, NM study 8/28, normal EF, no ischemia      GI/NUTR:     Diet, Clear Liquid-advance 8/30 AM    GI Prophylaxis-PPI     Bowel regimen-docusate, senna    /RENAL:     Strict I/O- passed TOV, incontinent    Lytes-replete PRN, no JESIKA      HEME/ONC:     DVT prophylaxis- restarted hsq    Acute anemia- stable @ 11/33, continue to trend, monitor for signs of bleeding    ID:     Antibiotics-none as per primary team, received 2g ancef intraop     ENDO:    low thyroid levels-will require outpatient endocrine f/u    no dx of DM- Glucose, Serum trend, start ISS if elevated >150     LINES/DRAINS: Natali RIZVI    DISPO: SICU d/w Dr. Arora Assessment & Plan    78y Female  s/p left cea, symptomatic    NEURO:     Pain-controlled with APAP, oxy    s/p left cea-q1 neurovascular checks, strict BP control    ataxia/vertigo-meclizine    RESP:     On RA, sat > 94%    Smoker-cxr stable, increase activity when cleared by neurology/vascular      CARDS:     hx HTN-restarted metoprolol, lisinopril    Hx of CAD s/p stents- restarted plavix & asa as per vascular team    Hx of DLD-restarted atorvastatin    Cardiac enzymes neg x3     Diagnostics-Echo 8/24, NM study 8/28, normal EF, no ischemia      GI/NUTR:     Diet, Clear Liquid-advance 8/30 AM    GI Prophylaxis-PPI     Bowel regimen-docusate, senna    /RENAL:     Strict I/O- passed TOV, incontinent    Lytes-replete PRN, no JESIKA    HEME/ONC:     DVT prophylaxis- restarted hsq    hx of CAD-restarted plavix, asa    Acute anemia- stable @ 11/33, continue to trend, monitor for signs of bleeding    ID:     Antibiotics-none as per primary team, received 2g ancef intraop     ENDO:    low thyroid levels-will require outpatient endocrine f/u    no dx of DM- Glucose, Serum trend, start ISS if elevated >150     LINES/DRAINS: Natali RIZVI    DISPO: Downgrade Assessment & Plan    78y Female  s/p left cea, symptomatic    NEURO:     Pain-controlled with APAP, oxy    s/p left cea-q1 neurovascular checks, strict BP control    ataxia/vertigo-meclizine    RESP:     On RA, sat > 94%    Smoker-cxr stable, increase activity when cleared by neurology/vascular    throat discomfort-cephacol lozenges  CARDS:     hx HTN-restarted metoprolol, lisinopril    Hx of CAD s/p stents- restarted plavix & asa as per vascular team    Hx of DLD-restarted atorvastatin    Cardiac enzymes neg x3     Diagnostics-Echo 8/24, NM study 8/28, normal EF, no ischemia      GI/NUTR:     Diet, Clear Liquid-advance 8/30 AM    GI Prophylaxis-PPI     Bowel regimen-docusate, senna    /RENAL:     Strict I/O- passed TOV, incontinent    Lytes-replete PRN, no JESIKA    HEME/ONC:     DVT prophylaxis- restarted hsq    hx of CAD-restarted plavix, asa    Acute anemia- stable @ 11/33, continue to trend, monitor for signs of bleeding    ID:     Antibiotics-none as per primary team, received 2g ancef intraop     ENDO:    low thyroid levels-will require outpatient endocrine f/u    no dx of DM- Glucose, Serum trend, start ISS if elevated >150     LINES/DRAINS: Natlai RIZVI    DISPO: Downgrade

## 2019-08-30 NOTE — PROGRESS NOTE ADULT - ATTENDING COMMENTS
s/p cea  cluster headaches : tylenol/oxy  normotensive,   voiding   diet s/p cea  cluster headaches : tylenol/oxy  episodes of hypotension: will hold antihypertensive medications  voiding   diet  as long as bp improves will downgrade

## 2019-08-30 NOTE — DIETITIAN INITIAL EVALUATION ADULT. - OTHER INFO
Pertinent Medical Information: p/w vertigo, ataxia. s/p L CEA.    Pertinent Subjective Information: Fair appetite & po intake PTP. Regular diet. NKFA. No supplements. No known h/o unintentional wt loss.

## 2019-08-30 NOTE — CHART NOTE - NSCHARTNOTEFT_GEN_A_CORE
SICU Transfer Note    MARCO A LAGUNA  78y (1940)  [id number]      Transfer from: SICU  Transfer to: Surgery-Vascular      SICU COURSE:  78y Female HD# 7d    s/p left cea, symptomatic    Overnight patient complained of frontal headache, controlled with Motrin  BP during AM rounds MAP <65, given 250ml bolus with resolution, will reevaluated approx 1100 for BP    8/30 @ 1100 evaluated patient regarding episode of non-bloody vomiting. Patient evaluated,   Neuro intact, no cranial defecits noted    PE:  -left cea incision site soft, no active signs of bleeding  -pupils      PAST MEDICAL & SURGICAL HISTORY:  Cataract  CAD (coronary artery disease)  HLD (hyperlipidemia)  HTN (hypertension)  H/O ovarian cystectomy  H/O hernia repair  History of tonsillectomy  History of cholecystectomy  History of surgery: cardiac stents x2    Allergies    No Known Allergies    Intolerances      MEDICATIONS  (STANDING):  aspirin enteric coated 81 milliGRAM(s) Oral daily  atorvastatin 40 milliGRAM(s) Oral at bedtime  chlorhexidine 4% Liquid 1 Application(s) Topical <User Schedule>  clopidogrel Tablet 75 milliGRAM(s) Oral daily  docusate sodium 100 milliGRAM(s) Oral three times a day  heparin  Injectable 5000 Unit(s) SubCutaneous every 8 hours  lisinopril 20 milliGRAM(s) Oral daily  metoprolol tartrate 25 milliGRAM(s) Oral two times a day  pantoprazole    Tablet 40 milliGRAM(s) Oral before breakfast    MEDICATIONS  (PRN):  acetaminophen   Tablet .. 650 milliGRAM(s) Oral every 6 hours PRN Temp greater or equal to 38C (100.4F), Mild Pain (1 - 3)  benzocaine 20% Spray 1 Spray(s) Topical three times a day PRN throat pain  meclizine 25 milliGRAM(s) Oral every 8 hours PRN Dizziness  ondansetron Injectable 4 milliGRAM(s) IV Push every 6 hours PRN Nausea  oxyCODONE    5 mG/acetaminophen 325 mG 1 Tablet(s) Oral every 4 hours PRN Moderate Pain (4 - 6)  oxyCODONE    5 mG/acetaminophen 325 mG 2 Tablet(s) Oral every 6 hours PRN Severe Pain (7 - 10)  senna 2 Tablet(s) Oral daily PRN Constipation      Vital Signs Last 24 Hrs  T(C): 36.1 (30 Aug 2019 08:00), Max: 37.5 (29 Aug 2019 16:45)  T(F): 97 (30 Aug 2019 08:00), Max: 99.5 (29 Aug 2019 16:45)  HR: 78 (30 Aug 2019 09:47) (74 - 90)  BP: 103/51 (30 Aug 2019 09:47) (97/45 - 121/58)  BP(mean): 66 (30 Aug 2019 09:47) (63 - 68)  RR: 21 (30 Aug 2019 09:47) (12 - 32)  SpO2: 94% (30 Aug 2019 09:47) (93% - 99%)  I&O's Summary    29 Aug 2019 07:01  -  30 Aug 2019 07:00  --------------------------------------------------------  IN: 1150 mL / OUT: 0 mL / NET: 1150 mL    30 Aug 2019 07:01  -  30 Aug 2019 10:56  --------------------------------------------------------  IN: 640 mL / OUT: 50 mL / NET: 590 mL          CAPILLARY BLOOD GLUCOSE                              10.8   12.71 )-----------( 254      ( 30 Aug 2019 04:45 )             32.6       08-30    140  |  106  |  11  ----------------------------<  112<H>  4.2   |  23  |  0.7    Ca    8.4<L>      30 Aug 2019 04:45  Phos  4.0     08-30  Mg     2.4     08-30    TPro  6.5  /  Alb  3.7  /  TBili  <0.2  /  DBili  x   /  AST  38  /  ALT  31  /  AlkPhos  110  08-29      PT/INR - ( 30 Aug 2019 04:45 )   PT: 13.10 sec;   INR: 1.14 ratio         PTT - ( 30 Aug 2019 04:45 )  PTT:27.2 sec  CARDIAC MARKERS ( 30 Aug 2019 04:45 )  x     / <0.01 ng/mL / 83 U/L / x     / 1.4 ng/mL  CARDIAC MARKERS ( 29 Aug 2019 23:30 )  x     / <0.01 ng/mL / 82 U/L / x     / 1.2 ng/mL  CARDIAC MARKERS ( 29 Aug 2019 17:20 )  x     / <0.01 ng/mL / 65 U/L / x     / <1.0 ng/mL                Assessment & Plan:      78y Female  s/p left cea, symptomatic    NEURO:     Pain-controlled with APAP, oxy    s/p left cea-q1 neurovascular checks, strict BP control    ataxia/vertigo-meclizine    N/V-patient vomitted post consumption of beef broth    RESP:     On RA, sat > 94%    Smoker-cxr stable, increase activity when cleared by neurology/vascular    throat discomfort-cephacol lozenges  CARDS:     hx HTN-restarted metoprolol, lisinopril    Hx of CAD s/p stents- restarted plavix & asa as per vascular team    Hx of DLD-restarted atorvastatin    Cardiac enzymes neg x3     Diagnostics-Echo 8/24, NM study 8/28, normal EF, no ischemia      GI/NUTR:     Diet, Clear Liquid-advance 8/30 AM    GI Prophylaxis-PPI     Bowel regimen-docusate, senna    /RENAL:     Strict I/O- passed TOV, incontinent    Lytes-replete PRN, no JESIKA    HEME/ONC:     DVT prophylaxis- restarted hsq    hx of CAD-restarted plavix, asa    Acute anemia- stable @ 11/33, continue to trend, monitor for signs of bleeding    ID:     Antibiotics-none as per primary team, received 2g ancef intraop     ENDO:    low thyroid levels-will require outpatient endocrine f/u    no dx of DM- Glucose, Serum trend, start ISS if elevated >150     LINES/DRAINS: Natali RIZVI    DISPO: Downgrade      Follow Up:  -2330 labs  -f/u headache, currently resolved with motrin      Signed out to:  Date:  Time: SICU Transfer Note    MARCO A LAGUNA  78y (1940)  [id number]      Transfer from: SICU  Transfer to: Surgery-Vascular      SICU COURSE:  78y Female HD# 7d    s/p left cea, symptomatic    Overnight patient complained of frontal headache, controlled with Motrin  BP during AM rounds MAP <65, given 250ml bolus with resolution, will reevaluated approx 1100 for BP    8/30 @ 1100 evaluated patient regarding episode of non-bloody vomiting. Patient evaluated, emesis was the food intake of beef broth prior to being moved in bed. Patient did not receive any dose of her meczlaine in last 12+ hours.    PE:  -left cea incision site soft, no active signs of bleeding  -throat pain resolving  -pupils reactive to light, symmetrical  -cranial nerves 2-12 intact  -no pronator drift b/l extremities  -no apshasia    Patient upon examination with RN, family bedside states her HA has resolved. Patient to receive Zofran and meclazine and oxy prn for pain.      PAST MEDICAL & SURGICAL HISTORY:  Cataract  CAD (coronary artery disease)  HLD (hyperlipidemia)  HTN (hypertension)  H/O ovarian cystectomy  H/O hernia repair  History of tonsillectomy  History of cholecystectomy  History of surgery: cardiac stents x2    Allergies:  No Known Allergies  Intolerances      MEDICATIONS  (STANDING):  aspirin enteric coated 81 milliGRAM(s) Oral daily  atorvastatin 40 milliGRAM(s) Oral at bedtime  chlorhexidine 4% Liquid 1 Application(s) Topical <User Schedule>  clopidogrel Tablet 75 milliGRAM(s) Oral daily  docusate sodium 100 milliGRAM(s) Oral three times a day  heparin  Injectable 5000 Unit(s) SubCutaneous every 8 hours  lisinopril 20 milliGRAM(s) Oral daily  metoprolol tartrate 25 milliGRAM(s) Oral two times a day  pantoprazole    Tablet 40 milliGRAM(s) Oral before breakfast    MEDICATIONS  (PRN):  acetaminophen   Tablet .. 650 milliGRAM(s) Oral every 6 hours PRN Temp greater or equal to 38C (100.4F), Mild Pain (1 - 3)  benzocaine 20% Spray 1 Spray(s) Topical three times a day PRN throat pain  meclizine 25 milliGRAM(s) Oral every 8 hours PRN Dizziness  ondansetron Injectable 4 milliGRAM(s) IV Push every 6 hours PRN Nausea  oxyCODONE    5 mG/acetaminophen 325 mG 1 Tablet(s) Oral every 4 hours PRN Moderate Pain (4 - 6)  oxyCODONE    5 mG/acetaminophen 325 mG 2 Tablet(s) Oral every 6 hours PRN Severe Pain (7 - 10)  senna 2 Tablet(s) Oral daily PRN Constipation      Vital Signs Last 24 Hrs  T(C): 36.1 (30 Aug 2019 08:00), Max: 37.5 (29 Aug 2019 16:45)  T(F): 97 (30 Aug 2019 08:00), Max: 99.5 (29 Aug 2019 16:45)  HR: 78 (30 Aug 2019 09:47) (74 - 90)  BP: 103/51 (30 Aug 2019 09:47) (97/45 - 121/58)  BP(mean): 66 (30 Aug 2019 09:47) (63 - 68)  RR: 21 (30 Aug 2019 09:47) (12 - 32)  SpO2: 94% (30 Aug 2019 09:47) (93% - 99%)  I&O's Summary    29 Aug 2019 07:01  -  30 Aug 2019 07:00  --------------------------------------------------------  IN: 1150 mL / OUT: 0 mL / NET: 1150 mL    30 Aug 2019 07:01  -  30 Aug 2019 10:56  --------------------------------------------------------  IN: 640 mL / OUT: 50 mL / NET: 590 mL        CAPILLARY BLOOD GLUCOSE                            10.8   12.71 )-----------( 254      ( 30 Aug 2019 04:45 )             32.6       08-30    140  |  106  |  11  ----------------------------<  112<H>  4.2   |  23  |  0.7    Ca    8.4<L>      30 Aug 2019 04:45  Phos  4.0     08-30  Mg     2.4     08-30    TPro  6.5  /  Alb  3.7  /  TBili  <0.2  /  DBili  x   /  AST  38  /  ALT  31  /  AlkPhos  110  08-29      PT/INR - ( 30 Aug 2019 04:45 )   PT: 13.10 sec;   INR: 1.14 ratio         PTT - ( 30 Aug 2019 04:45 )  PTT:27.2 sec  CARDIAC MARKERS ( 30 Aug 2019 04:45 )  x     / <0.01 ng/mL / 83 U/L / x     / 1.4 ng/mL  CARDIAC MARKERS ( 29 Aug 2019 23:30 )  x     / <0.01 ng/mL / 82 U/L / x     / 1.2 ng/mL  CARDIAC MARKERS ( 29 Aug 2019 17:20 )  x     / <0.01 ng/mL / 65 U/L / x     / <1.0 ng/mL        Assessment & Plan:    78y Female  s/p left cea, symptomatic    NEURO:     Pain-controlled with APAP, oxy    s/p left cea-q4 neurovascular checks, strict BP control    ataxia/vertigo-meclizine    episodic HA-resolves with pain rx prn, most episodes self limiting    N/V-most likely 2/2 being laid flat and HOB raised quickly post broth consumption, no defecits noted on exam    RESP:     On RA, sat > 94%    Smoker-cxr stable, ambulate with assistance as per vascular team d/w Dr. Sharma    throat discomfort- lozenges, hurricane spray    CARDS:     hx HTN-will restart metoprolol and lisinopril with BP resolution    Hx of CAD s/p stents- restarted plavix & asa as per vascular team    Hx of DLD-restarted atorvastatin    Cardiac enzymes neg x3     Diagnostics-Echo 8/24, NM study 8/28, normal EF, no ischemia      GI/NUTR:     Diet,-advanced to regular diet    GI Prophylaxis-PPI     Bowel regimen-docusate, senna    /RENAL:     Strict I/O- passed TOV, incontinent    Lytes-replete PRN, no JESIKA    HEME/ONC:     DVT prophylaxis- restarted hsq    hx of CAD-restarted plavix, asa    Acute anemia- stable @ 11/33, continue to trend, monitor for signs of bleeding    ID:     Antibiotics-none as per primary team, received 2g ancef intraop     ENDO:    low thyroid levels-will require outpatient endocrine f/u    no dx of DM- Glucose, Serum trend, start ISS if elevated >150       Follow Up:  -restart metoprolol and lisnopril once BP resolves  -2330 labs  -f/u headache, resolved latisha carranzalizine, oxy      Signed out to:   Date: 8/30/19  Time: SICU Transfer Note    MARCO A LAGUNA  78y (1940)  [id number]      Transfer from: SICU  Transfer to: Surgery-Vascular      SICU COURSE:  78y Female HD# 7d    s/p left cea, symptomatic    Overnight patient complained of frontal headache, controlled with Motrin  BP during AM rounds MAP <65, given 250ml bolus with resolution, will reevaluated approx 1100 for BP    8/30 @ 1100 evaluated patient regarding episode of non-bloody vomiting. Patient evaluated, emesis was the food intake of beef broth prior to being moved in bed. Patient did not receive any dose of her meczlaine in last 12+ hours.    PE:  -left cea incision site soft, no active signs of bleeding  -throat pain resolving  -pupils reactive to light, symmetrical  -cranial nerves 2-12 intact  -no pronator drift b/l extremities  -no apshasia    Patient upon examination with RN, family bedside states her HA has resolved. Patient to receive Zofran and meclazine and oxy prn for pain.      PAST MEDICAL & SURGICAL HISTORY:  Cataract  CAD (coronary artery disease)  HLD (hyperlipidemia)  HTN (hypertension)  H/O ovarian cystectomy  H/O hernia repair  History of tonsillectomy  History of cholecystectomy  History of surgery: cardiac stents x2    Allergies:  No Known Allergies  Intolerances      MEDICATIONS  (STANDING):  aspirin enteric coated 81 milliGRAM(s) Oral daily  atorvastatin 40 milliGRAM(s) Oral at bedtime  chlorhexidine 4% Liquid 1 Application(s) Topical <User Schedule>  clopidogrel Tablet 75 milliGRAM(s) Oral daily  docusate sodium 100 milliGRAM(s) Oral three times a day  heparin  Injectable 5000 Unit(s) SubCutaneous every 8 hours  lisinopril 20 milliGRAM(s) Oral daily  metoprolol tartrate 25 milliGRAM(s) Oral two times a day  pantoprazole    Tablet 40 milliGRAM(s) Oral before breakfast    MEDICATIONS  (PRN):  acetaminophen   Tablet .. 650 milliGRAM(s) Oral every 6 hours PRN Temp greater or equal to 38C (100.4F), Mild Pain (1 - 3)  benzocaine 20% Spray 1 Spray(s) Topical three times a day PRN throat pain  meclizine 25 milliGRAM(s) Oral every 8 hours PRN Dizziness  ondansetron Injectable 4 milliGRAM(s) IV Push every 6 hours PRN Nausea  oxyCODONE    5 mG/acetaminophen 325 mG 1 Tablet(s) Oral every 4 hours PRN Moderate Pain (4 - 6)  oxyCODONE    5 mG/acetaminophen 325 mG 2 Tablet(s) Oral every 6 hours PRN Severe Pain (7 - 10)  senna 2 Tablet(s) Oral daily PRN Constipation      Vital Signs Last 24 Hrs  T(C): 36.1 (30 Aug 2019 08:00), Max: 37.5 (29 Aug 2019 16:45)  T(F): 97 (30 Aug 2019 08:00), Max: 99.5 (29 Aug 2019 16:45)  HR: 78 (30 Aug 2019 09:47) (74 - 90)  BP: 103/51 (30 Aug 2019 09:47) (97/45 - 121/58)  BP(mean): 66 (30 Aug 2019 09:47) (63 - 68)  RR: 21 (30 Aug 2019 09:47) (12 - 32)  SpO2: 94% (30 Aug 2019 09:47) (93% - 99%)  I&O's Summary    29 Aug 2019 07:01  -  30 Aug 2019 07:00  --------------------------------------------------------  IN: 1150 mL / OUT: 0 mL / NET: 1150 mL    30 Aug 2019 07:01  -  30 Aug 2019 10:56  --------------------------------------------------------  IN: 640 mL / OUT: 50 mL / NET: 590 mL        CAPILLARY BLOOD GLUCOSE                            10.8   12.71 )-----------( 254      ( 30 Aug 2019 04:45 )             32.6       08-30    140  |  106  |  11  ----------------------------<  112<H>  4.2   |  23  |  0.7    Ca    8.4<L>      30 Aug 2019 04:45  Phos  4.0     08-30  Mg     2.4     08-30    TPro  6.5  /  Alb  3.7  /  TBili  <0.2  /  DBili  x   /  AST  38  /  ALT  31  /  AlkPhos  110  08-29      PT/INR - ( 30 Aug 2019 04:45 )   PT: 13.10 sec;   INR: 1.14 ratio         PTT - ( 30 Aug 2019 04:45 )  PTT:27.2 sec  CARDIAC MARKERS ( 30 Aug 2019 04:45 )  x     / <0.01 ng/mL / 83 U/L / x     / 1.4 ng/mL  CARDIAC MARKERS ( 29 Aug 2019 23:30 )  x     / <0.01 ng/mL / 82 U/L / x     / 1.2 ng/mL  CARDIAC MARKERS ( 29 Aug 2019 17:20 )  x     / <0.01 ng/mL / 65 U/L / x     / <1.0 ng/mL        Assessment & Plan:    78y Female  s/p left cea, symptomatic    NEURO:     Pain-controlled with APAP, oxy    s/p left cea-q4 neurovascular checks, strict BP control    ataxia/vertigo-meclizine    episodic HA-resolves with pain rx prn, most episodes self limiting, now improved    N/V-most likely 2/2 being laid flat and HOB raised quickly post broth consumption, no deficits noted on exam    RESP:     On RA, sat > 94%    Smoker-cxr stable, ambulate with assistance as per vascular team d/w Dr. Sharma    throat discomfort- lozenges, hurricane spray    CARDS:     hx HTN-will restart metoprolol and lisinopril with BP resolution    Hx of CAD s/p stents- restarted plavix & asa as per vascular team    Hx of DLD-restarted atorvastatin    Cardiac enzymes neg x3     Diagnostics-Echo 8/24, NM study 8/28, normal EF, no ischemia      GI/NUTR:     Diet,-advanced to regular diet    GI Prophylaxis-PPI     Bowel regimen-docusate, senna    /RENAL:     Strict I/O- passed TOV, incontinent    Lytes-replete PRN, no JESIKA    HEME/ONC:     DVT prophylaxis- restarted hsq    hx of CAD-restarted plavix, asa    Acute anemia- stable @ 11/33, continue to trend, monitor for signs of bleeding    ID:     Antibiotics-none as per primary team, received 2g ancef intraop     ENDO:    low thyroid levels-will require outpatient endocrine f/u    no dx of DM- Glucose, Serum trend, start ISS if elevated >150       Follow Up:  -restart metoprolol and lisnopril once BP resolves  -2330 labs  -f/u headache, resolved wiith meclizine, oxy      Signed out to: Dr. Sharma  Date: 8/30/19  Time: 13:04 SICU Transfer Note    MARCO A LAGUNA  78y (1940)  [id number]      Transfer from: SICU  Transfer to: Surgery-Vascular      SICU COURSE:  78y Female HD# 7d    s/p left cea, symptomatic    Overnight patient complained of frontal headache, controlled with Motrin  BP during AM rounds MAP <65, given 250ml bolus with resolution, will reevaluated approx 1100 for BP    8/30 @ 1100 evaluated patient regarding episode of non-bloody vomiting. Patient evaluated, emesis was the food intake of beef broth prior to being moved in bed. Patient did not receive any dose of her meczlaine in last 12+ hours.    PE:  -left cea incision site soft, no active signs of bleeding  -throat pain resolving  -pupils reactive to light, symmetrical  -cranial nerves 2-12 intact  -no pronator drift b/l extremities  -no apshasia    Patient upon examination with RN, family bedside states her HA has resolved. Patient to receive Zofran and meclazine and oxy prn for pain.      PAST MEDICAL & SURGICAL HISTORY:  Cataract  CAD (coronary artery disease)  HLD (hyperlipidemia)  HTN (hypertension)  H/O ovarian cystectomy  H/O hernia repair  History of tonsillectomy  History of cholecystectomy  History of surgery: cardiac stents x2    Allergies:  No Known Allergies  Intolerances      MEDICATIONS  (STANDING):  aspirin enteric coated 81 milliGRAM(s) Oral daily  atorvastatin 40 milliGRAM(s) Oral at bedtime  chlorhexidine 4% Liquid 1 Application(s) Topical <User Schedule>  clopidogrel Tablet 75 milliGRAM(s) Oral daily  docusate sodium 100 milliGRAM(s) Oral three times a day  heparin  Injectable 5000 Unit(s) SubCutaneous every 8 hours  lisinopril 20 milliGRAM(s) Oral daily  metoprolol tartrate 25 milliGRAM(s) Oral two times a day  pantoprazole    Tablet 40 milliGRAM(s) Oral before breakfast    MEDICATIONS  (PRN):  acetaminophen   Tablet .. 650 milliGRAM(s) Oral every 6 hours PRN Temp greater or equal to 38C (100.4F), Mild Pain (1 - 3)  benzocaine 20% Spray 1 Spray(s) Topical three times a day PRN throat pain  meclizine 25 milliGRAM(s) Oral every 8 hours PRN Dizziness  ondansetron Injectable 4 milliGRAM(s) IV Push every 6 hours PRN Nausea  oxyCODONE    5 mG/acetaminophen 325 mG 1 Tablet(s) Oral every 4 hours PRN Moderate Pain (4 - 6)  oxyCODONE    5 mG/acetaminophen 325 mG 2 Tablet(s) Oral every 6 hours PRN Severe Pain (7 - 10)  senna 2 Tablet(s) Oral daily PRN Constipation      Vital Signs Last 24 Hrs  T(C): 36.1 (30 Aug 2019 08:00), Max: 37.5 (29 Aug 2019 16:45)  T(F): 97 (30 Aug 2019 08:00), Max: 99.5 (29 Aug 2019 16:45)  HR: 78 (30 Aug 2019 09:47) (74 - 90)  BP: 103/51 (30 Aug 2019 09:47) (97/45 - 121/58)  BP(mean): 66 (30 Aug 2019 09:47) (63 - 68)  RR: 21 (30 Aug 2019 09:47) (12 - 32)  SpO2: 94% (30 Aug 2019 09:47) (93% - 99%)  I&O's Summary    29 Aug 2019 07:01  -  30 Aug 2019 07:00  --------------------------------------------------------  IN: 1150 mL / OUT: 0 mL / NET: 1150 mL    30 Aug 2019 07:01  -  30 Aug 2019 10:56  --------------------------------------------------------  IN: 640 mL / OUT: 50 mL / NET: 590 mL        CAPILLARY BLOOD GLUCOSE                            10.8   12.71 )-----------( 254      ( 30 Aug 2019 04:45 )             32.6       08-30    140  |  106  |  11  ----------------------------<  112<H>  4.2   |  23  |  0.7    Ca    8.4<L>      30 Aug 2019 04:45  Phos  4.0     08-30  Mg     2.4     08-30    TPro  6.5  /  Alb  3.7  /  TBili  <0.2  /  DBili  x   /  AST  38  /  ALT  31  /  AlkPhos  110  08-29      PT/INR - ( 30 Aug 2019 04:45 )   PT: 13.10 sec;   INR: 1.14 ratio         PTT - ( 30 Aug 2019 04:45 )  PTT:27.2 sec  CARDIAC MARKERS ( 30 Aug 2019 04:45 )  x     / <0.01 ng/mL / 83 U/L / x     / 1.4 ng/mL  CARDIAC MARKERS ( 29 Aug 2019 23:30 )  x     / <0.01 ng/mL / 82 U/L / x     / 1.2 ng/mL  CARDIAC MARKERS ( 29 Aug 2019 17:20 )  x     / <0.01 ng/mL / 65 U/L / x     / <1.0 ng/mL        Assessment & Plan:    78y Female  s/p left cea, symptomatic    NEURO:     Pain-controlled with APAP, oxy    s/p left cea-q4 neurovascular checks, strict BP control    ataxia/vertigo-meclizine    episodic HA-resolves with pain rx prn, most episodes self limiting, now improved    N/V-most likely 2/2 being laid flat and HOB raised quickly post broth consumption, no deficits noted on exam    RESP:     On RA, sat > 94%    Smoker-cxr stable, ambulate with assistance as per vascular team d/w Dr. Sharma    throat discomfort- lozenges, hurricane spray    CARDS:     hx HTN-will restart metoprolol and lisinopril with BP resolution    Hx of CAD s/p stents- restarted plavix & asa as per vascular team    Hx of DLD-restarted atorvastatin    Cardiac enzymes neg x3     Diagnostics-Echo 8/24, NM study 8/28, normal EF, no ischemia      GI/NUTR:     Diet,-advanced to regular diet    GI Prophylaxis-PPI     Bowel regimen-docusate, senna    /RENAL:     Strict I/O- passed TOV, incontinent    Lytes-replete PRN, no JESIKA    HEME/ONC:     DVT prophylaxis- restarted hsq    hx of CAD-restarted plavix, asa    Acute anemia- stable @ 11/33, continue to trend, monitor for signs of bleeding    ID:     Antibiotics-none as per primary team, received 2g ancef intraop     ENDO:    low thyroid levels-will require outpatient endocrine f/u    no dx of DM- Glucose, Serum trend, start ISS if elevated >150       Follow Up:  -restart metoprolol and lisnopril once BP resolves  -2330 labs  -f/u headache, resolved wiith meclizine, oxy, may consider CTH, vascular team aware      Signed out to: Dr. Sharma  Date: 8/30/19  Time: 13:04 SICU Transfer Note    MARCO A LAGUNA  78y (1940)  [id number]      Transfer from: SICU  Transfer to: Surgery-Vascular      SICU COURSE:  78y Female HD# 7d    s/p left cea, symptomatic    Overnight patient complained of frontal headache, controlled with Motrin  BP during AM rounds MAP <65, given 250ml bolus with resolution, will reevaluated approx 1100 for BP    8/30 @ 1100 evaluated patient regarding episode of non-bloody vomiting. Patient evaluated, emesis was the food intake of beef broth prior to being moved in bed. Patient did not receive any dose of her meczlaine in last 12+ hours.    PE:  -left cea incision site soft, no active signs of bleeding  -throat pain resolving  -pupils reactive to light, symmetrical  -cranial nerves 2-12 intact  -no pronator drift b/l extremities  -no apshasia    Patient upon examination with RN, family bedside states her HA has resolved. Patient to receive Zofran and meclazine and oxy prn for pain.      PAST MEDICAL & SURGICAL HISTORY:  Cataract  CAD (coronary artery disease)  HLD (hyperlipidemia)  HTN (hypertension)  H/O ovarian cystectomy  H/O hernia repair  History of tonsillectomy  History of cholecystectomy  History of surgery: cardiac stents x2    Allergies:  No Known Allergies  Intolerances      MEDICATIONS  (STANDING):  aspirin enteric coated 81 milliGRAM(s) Oral daily  atorvastatin 40 milliGRAM(s) Oral at bedtime  chlorhexidine 4% Liquid 1 Application(s) Topical <User Schedule>  clopidogrel Tablet 75 milliGRAM(s) Oral daily  docusate sodium 100 milliGRAM(s) Oral three times a day  heparin  Injectable 5000 Unit(s) SubCutaneous every 8 hours  lisinopril 20 milliGRAM(s) Oral daily  metoprolol tartrate 25 milliGRAM(s) Oral two times a day  pantoprazole    Tablet 40 milliGRAM(s) Oral before breakfast    MEDICATIONS  (PRN):  acetaminophen   Tablet .. 650 milliGRAM(s) Oral every 6 hours PRN Temp greater or equal to 38C (100.4F), Mild Pain (1 - 3)  benzocaine 20% Spray 1 Spray(s) Topical three times a day PRN throat pain  meclizine 25 milliGRAM(s) Oral every 8 hours PRN Dizziness  ondansetron Injectable 4 milliGRAM(s) IV Push every 6 hours PRN Nausea  oxyCODONE    5 mG/acetaminophen 325 mG 1 Tablet(s) Oral every 4 hours PRN Moderate Pain (4 - 6)  oxyCODONE    5 mG/acetaminophen 325 mG 2 Tablet(s) Oral every 6 hours PRN Severe Pain (7 - 10)  senna 2 Tablet(s) Oral daily PRN Constipation      Vital Signs Last 24 Hrs  T(C): 36.1 (30 Aug 2019 08:00), Max: 37.5 (29 Aug 2019 16:45)  T(F): 97 (30 Aug 2019 08:00), Max: 99.5 (29 Aug 2019 16:45)  HR: 78 (30 Aug 2019 09:47) (74 - 90)  BP: 103/51 (30 Aug 2019 09:47) (97/45 - 121/58)  BP(mean): 66 (30 Aug 2019 09:47) (63 - 68)  RR: 21 (30 Aug 2019 09:47) (12 - 32)  SpO2: 94% (30 Aug 2019 09:47) (93% - 99%)  I&O's Summary    29 Aug 2019 07:01  -  30 Aug 2019 07:00  --------------------------------------------------------  IN: 1150 mL / OUT: 0 mL / NET: 1150 mL    30 Aug 2019 07:01  -  30 Aug 2019 10:56  --------------------------------------------------------  IN: 640 mL / OUT: 50 mL / NET: 590 mL        CAPILLARY BLOOD GLUCOSE                            10.8   12.71 )-----------( 254      ( 30 Aug 2019 04:45 )             32.6       08-30    140  |  106  |  11  ----------------------------<  112<H>  4.2   |  23  |  0.7    Ca    8.4<L>      30 Aug 2019 04:45  Phos  4.0     08-30  Mg     2.4     08-30    TPro  6.5  /  Alb  3.7  /  TBili  <0.2  /  DBili  x   /  AST  38  /  ALT  31  /  AlkPhos  110  08-29      PT/INR - ( 30 Aug 2019 04:45 )   PT: 13.10 sec;   INR: 1.14 ratio         PTT - ( 30 Aug 2019 04:45 )  PTT:27.2 sec  CARDIAC MARKERS ( 30 Aug 2019 04:45 )  x     / <0.01 ng/mL / 83 U/L / x     / 1.4 ng/mL  CARDIAC MARKERS ( 29 Aug 2019 23:30 )  x     / <0.01 ng/mL / 82 U/L / x     / 1.2 ng/mL  CARDIAC MARKERS ( 29 Aug 2019 17:20 )  x     / <0.01 ng/mL / 65 U/L / x     / <1.0 ng/mL        Assessment & Plan:    78y Female  s/p left cea, symptomatic    NEURO:     Pain-controlled with APAP, oxy    s/p left cea-q4 neurovascular checks, strict BP control    ataxia/vertigo-meclizine    episodic HA-resolves with pain rx prn, most episodes self limiting, now improved    N/V-most likely 2/2 being laid flat and HOB raised quickly post broth consumption, no deficits noted on exam    RESP:     On RA, sat > 94%    Smoker-cxr stable, ambulate with assistance as per vascular team d/w Dr. Sharma    throat discomfort- lozenges, hurricane spray    CARDS:     hx HTN-will restart metoprolol and lisinopril with BP resolution    Hx of CAD s/p stents- restarted plavix & asa as per vascular team    Hx of DLD-restarted atorvastatin    Cardiac enzymes neg x3     Diagnostics-Echo 8/24, NM study 8/28, normal EF, no ischemia      GI/NUTR:     Diet,-advanced to regular diet    GI Prophylaxis-PPI     Bowel regimen-docusate, senna    /RENAL:     Strict I/O- passed TOV, incontinent    Lytes-replete PRN, no JESIKA    HEME/ONC:     DVT prophylaxis- restarted hsq    hx of CAD-restarted plavix, asa    Acute anemia- stable @ 11/33, continue to trend, monitor for signs of bleeding    ID:     Antibiotics-none as per primary team, received 2g ancef intraop     ENDO:    low thyroid levels-will require outpatient endocrine f/u    no dx of DM- Glucose, Serum trend, start ISS if elevated >150       Follow Up:  -restart metoprolol and lisnopril once BP resolves  -2330 labs  -f/u headache, resolved wiith meclizine, oxy, may consider CTH, vascular team aware    8/30/19 @ 1853 saw patient prior to being transported to , neuro intact, VSS  Primary team Dr. Sharma made aware of transfer    Signed out to: Dr. Sharma  Date: 8/30/19  Time: 13:04

## 2019-08-30 NOTE — DIETITIAN INITIAL EVALUATION ADULT. - PHYSICAL APPEARANCE
BMI: 34.3 (latest wt 87.7 kg). 1+ edema (B/L foot). Alert, somnolent. Last BM reportedly 8/25 with constipation reported. Vomiting today reported. Per 8/23 SLP eval +toleration of thin, puree and regular solids w/o overt s/s of penetration/aspiration. Recommends reg w/ thin. Skin: surgical incision.

## 2019-08-30 NOTE — PROGRESS NOTE ADULT - SUBJECTIVE AND OBJECTIVE BOX
Patient was seen and examined. Spoke with RN. Chart reviewed.    No events overnight.  Vital Signs Last 24 Hrs  T(F): 97.8 (30 Aug 2019 12:00), Max: 99.5 (29 Aug 2019 16:45)  HR: 78 (30 Aug 2019 14:00) (74 - 96)  BP: 110/50 (30 Aug 2019 13:00) (97/45 - 121/58)  SpO2: 94% (30 Aug 2019 14:00) (92% - 99%)  MEDICATIONS  (STANDING):  aspirin enteric coated 81 milliGRAM(s) Oral daily  atorvastatin 40 milliGRAM(s) Oral at bedtime  chlorhexidine 4% Liquid 1 Application(s) Topical <User Schedule>  clopidogrel Tablet 75 milliGRAM(s) Oral daily  docusate sodium 100 milliGRAM(s) Oral three times a day  heparin  Injectable 5000 Unit(s) SubCutaneous every 8 hours  pantoprazole    Tablet 40 milliGRAM(s) Oral before breakfast    MEDICATIONS  (PRN):  acetaminophen   Tablet .. 650 milliGRAM(s) Oral every 6 hours PRN Temp greater or equal to 38C (100.4F), Mild Pain (1 - 3)  benzocaine 20% Spray 1 Spray(s) Topical three times a day PRN throat pain  meclizine 25 milliGRAM(s) Oral every 8 hours PRN Dizziness  ondansetron Injectable 4 milliGRAM(s) IV Push every 6 hours PRN Nausea  oxyCODONE    5 mG/acetaminophen 325 mG 1 Tablet(s) Oral every 4 hours PRN Moderate Pain (4 - 6)  oxyCODONE    5 mG/acetaminophen 325 mG 2 Tablet(s) Oral every 6 hours PRN Severe Pain (7 - 10)  senna 2 Tablet(s) Oral daily PRN Constipation    Labs:                        10.8   12.71 )-----------( 254      ( 30 Aug 2019 04:45 )             32.6                         11.1   9.97  )-----------( 243      ( 29 Aug 2019 17:20 )             33.1     30 Aug 2019 04:45    140    |  106    |  11     ----------------------------<  112    4.2     |  23     |  0.7    29 Aug 2019 17:20    140    |  105    |  8      ----------------------------<  153    3.4     |  22     |  0.6      Ca    8.4        30 Aug 2019 04:45  Ca    8.5        29 Aug 2019 17:20  Phos  4.0       30 Aug 2019 04:45  Phos  3.0       29 Aug 2019 17:20  Mg     2.4       30 Aug 2019 04:45  Mg     1.7       29 Aug 2019 17:20    TPro  6.5    /  Alb  3.7    /  TBili  <0.2   /  DBili  x      /  AST  38     /  ALT  31     /  AlkPhos  110    29 Aug 2019 06:03    PT/INR - ( 30 Aug 2019 04:45 )   PT: 13.10 sec;   INR: 1.14 ratio         PTT - ( 30 Aug 2019 04:45 )  PTT:27.2 sec        Radiology:    General: comfortable, NAD  Neurology: A&Ox3, nonfocal  Head:  Normocephalic, atraumatic  ENT:  Mucosa moist, no ulcerations/left neck wd  Neck:  Supple, no JVD,   Resp: CTA B/L  CV: RRR, S1S2,   GI: Soft, NT, bowel sounds  MS: No edema, + peripheral pulses, FROM all 4 extremity

## 2019-08-30 NOTE — DIETITIAN INITIAL EVALUATION ADULT. - RD TO REMAIN AVAILABLE
Nutrition Intervention: Meals & Snacks, Medical Food Supplements. Monitoring/Evaluation: Energy intake, glucose profile, renal profile, nutrition focused physical findings, body composition./yes

## 2019-08-31 LAB
ANION GAP SERPL CALC-SCNC: 12 MMOL/L — SIGNIFICANT CHANGE UP (ref 7–14)
ANION GAP SERPL CALC-SCNC: 9 MMOL/L — SIGNIFICANT CHANGE UP (ref 7–14)
BUN SERPL-MCNC: 8 MG/DL — LOW (ref 10–20)
BUN SERPL-MCNC: 9 MG/DL — LOW (ref 10–20)
CALCIUM SERPL-MCNC: 8.4 MG/DL — LOW (ref 8.5–10.1)
CALCIUM SERPL-MCNC: 8.6 MG/DL — SIGNIFICANT CHANGE UP (ref 8.5–10.1)
CHLORIDE SERPL-SCNC: 105 MMOL/L — SIGNIFICANT CHANGE UP (ref 98–110)
CHLORIDE SERPL-SCNC: 106 MMOL/L — SIGNIFICANT CHANGE UP (ref 98–110)
CK MB CFR SERPL CALC: 2.1 NG/ML — SIGNIFICANT CHANGE UP (ref 0.6–6.3)
CK SERPL-CCNC: 116 U/L — SIGNIFICANT CHANGE UP (ref 0–225)
CO2 SERPL-SCNC: 23 MMOL/L — SIGNIFICANT CHANGE UP (ref 17–32)
CO2 SERPL-SCNC: 26 MMOL/L — SIGNIFICANT CHANGE UP (ref 17–32)
CREAT SERPL-MCNC: 0.6 MG/DL — LOW (ref 0.7–1.5)
CREAT SERPL-MCNC: 0.7 MG/DL — SIGNIFICANT CHANGE UP (ref 0.7–1.5)
GLUCOSE SERPL-MCNC: 110 MG/DL — HIGH (ref 70–99)
GLUCOSE SERPL-MCNC: 118 MG/DL — HIGH (ref 70–99)
HCT VFR BLD CALC: 31.5 % — LOW (ref 37–47)
HCT VFR BLD CALC: 32.2 % — LOW (ref 37–47)
HGB BLD-MCNC: 10.4 G/DL — LOW (ref 12–16)
HGB BLD-MCNC: 10.5 G/DL — LOW (ref 12–16)
MAGNESIUM SERPL-MCNC: 2.2 MG/DL — SIGNIFICANT CHANGE UP (ref 1.8–2.4)
MAGNESIUM SERPL-MCNC: 2.3 MG/DL — SIGNIFICANT CHANGE UP (ref 1.8–2.4)
MCHC RBC-ENTMCNC: 30.5 PG — SIGNIFICANT CHANGE UP (ref 27–31)
MCHC RBC-ENTMCNC: 31 PG — SIGNIFICANT CHANGE UP (ref 27–31)
MCHC RBC-ENTMCNC: 32.6 G/DL — SIGNIFICANT CHANGE UP (ref 32–37)
MCHC RBC-ENTMCNC: 33 G/DL — SIGNIFICANT CHANGE UP (ref 32–37)
MCV RBC AUTO: 93.6 FL — SIGNIFICANT CHANGE UP (ref 81–99)
MCV RBC AUTO: 93.8 FL — SIGNIFICANT CHANGE UP (ref 81–99)
NRBC # BLD: 0 /100 WBCS — SIGNIFICANT CHANGE UP (ref 0–0)
NRBC # BLD: 0 /100 WBCS — SIGNIFICANT CHANGE UP (ref 0–0)
PHOSPHATE SERPL-MCNC: 2.9 MG/DL — SIGNIFICANT CHANGE UP (ref 2.1–4.9)
PHOSPHATE SERPL-MCNC: 3.2 MG/DL — SIGNIFICANT CHANGE UP (ref 2.1–4.9)
PLATELET # BLD AUTO: 209 K/UL — SIGNIFICANT CHANGE UP (ref 130–400)
PLATELET # BLD AUTO: 229 K/UL — SIGNIFICANT CHANGE UP (ref 130–400)
POTASSIUM SERPL-MCNC: 3.7 MMOL/L — SIGNIFICANT CHANGE UP (ref 3.5–5)
POTASSIUM SERPL-MCNC: 3.8 MMOL/L — SIGNIFICANT CHANGE UP (ref 3.5–5)
POTASSIUM SERPL-SCNC: 3.7 MMOL/L — SIGNIFICANT CHANGE UP (ref 3.5–5)
POTASSIUM SERPL-SCNC: 3.8 MMOL/L — SIGNIFICANT CHANGE UP (ref 3.5–5)
RBC # BLD: 3.36 M/UL — LOW (ref 4.2–5.4)
RBC # BLD: 3.44 M/UL — LOW (ref 4.2–5.4)
RBC # FLD: 13.9 % — SIGNIFICANT CHANGE UP (ref 11.5–14.5)
RBC # FLD: 13.9 % — SIGNIFICANT CHANGE UP (ref 11.5–14.5)
SODIUM SERPL-SCNC: 140 MMOL/L — SIGNIFICANT CHANGE UP (ref 135–146)
SODIUM SERPL-SCNC: 141 MMOL/L — SIGNIFICANT CHANGE UP (ref 135–146)
TROPONIN T SERPL-MCNC: <0.01 NG/ML — SIGNIFICANT CHANGE UP
WBC # BLD: 8.17 K/UL — SIGNIFICANT CHANGE UP (ref 4.8–10.8)
WBC # BLD: 9.7 K/UL — SIGNIFICANT CHANGE UP (ref 4.8–10.8)
WBC # FLD AUTO: 8.17 K/UL — SIGNIFICANT CHANGE UP (ref 4.8–10.8)
WBC # FLD AUTO: 9.7 K/UL — SIGNIFICANT CHANGE UP (ref 4.8–10.8)

## 2019-08-31 PROCEDURE — 71045 X-RAY EXAM CHEST 1 VIEW: CPT | Mod: 26

## 2019-08-31 RX ORDER — FAMOTIDINE 10 MG/ML
20 INJECTION INTRAVENOUS ONCE
Refills: 0 | Status: COMPLETED | OUTPATIENT
Start: 2019-08-31 | End: 2019-08-31

## 2019-08-31 RX ADMIN — OXYCODONE AND ACETAMINOPHEN 1 TABLET(S): 5; 325 TABLET ORAL at 20:02

## 2019-08-31 RX ADMIN — OXYCODONE AND ACETAMINOPHEN 2 TABLET(S): 5; 325 TABLET ORAL at 09:14

## 2019-08-31 RX ADMIN — HEPARIN SODIUM 5000 UNIT(S): 5000 INJECTION INTRAVENOUS; SUBCUTANEOUS at 06:09

## 2019-08-31 RX ADMIN — CLOPIDOGREL BISULFATE 75 MILLIGRAM(S): 75 TABLET, FILM COATED ORAL at 13:24

## 2019-08-31 RX ADMIN — HEPARIN SODIUM 5000 UNIT(S): 5000 INJECTION INTRAVENOUS; SUBCUTANEOUS at 21:48

## 2019-08-31 RX ADMIN — OXYCODONE AND ACETAMINOPHEN 1 TABLET(S): 5; 325 TABLET ORAL at 06:00

## 2019-08-31 RX ADMIN — Medication 81 MILLIGRAM(S): at 13:25

## 2019-08-31 RX ADMIN — Medication 100 MILLIGRAM(S): at 06:09

## 2019-08-31 RX ADMIN — HEPARIN SODIUM 5000 UNIT(S): 5000 INJECTION INTRAVENOUS; SUBCUTANEOUS at 13:24

## 2019-08-31 RX ADMIN — OXYCODONE AND ACETAMINOPHEN 1 TABLET(S): 5; 325 TABLET ORAL at 21:27

## 2019-08-31 RX ADMIN — FAMOTIDINE 20 MILLIGRAM(S): 10 INJECTION INTRAVENOUS at 09:19

## 2019-08-31 RX ADMIN — ATORVASTATIN CALCIUM 40 MILLIGRAM(S): 80 TABLET, FILM COATED ORAL at 21:47

## 2019-08-31 RX ADMIN — OXYCODONE AND ACETAMINOPHEN 1 TABLET(S): 5; 325 TABLET ORAL at 23:48

## 2019-08-31 RX ADMIN — Medication 100 MILLIGRAM(S): at 13:24

## 2019-08-31 RX ADMIN — PANTOPRAZOLE SODIUM 40 MILLIGRAM(S): 20 TABLET, DELAYED RELEASE ORAL at 06:09

## 2019-08-31 RX ADMIN — Medication 100 MILLIGRAM(S): at 21:47

## 2019-08-31 RX ADMIN — OXYCODONE AND ACETAMINOPHEN 2 TABLET(S): 5; 325 TABLET ORAL at 10:34

## 2019-08-31 RX ADMIN — OXYCODONE AND ACETAMINOPHEN 1 TABLET(S): 5; 325 TABLET ORAL at 05:05

## 2019-08-31 RX ADMIN — CHLORHEXIDINE GLUCONATE 1 APPLICATION(S): 213 SOLUTION TOPICAL at 06:09

## 2019-08-31 NOTE — PROGRESS NOTE ADULT - SUBJECTIVE AND OBJECTIVE BOX
GENERAL SURGERY PROGRESS NOTE     MARCO A LAGUNA  [address] [place] [address] day :8d  POD:  Procedure: Left carotid endarterectomy    Surgical Attending: Armando Marquis  Overnight events: POD 2 from LCEA, downgraded from SICU with out incident, tolerating regular diet, ambulating.     T(F): 97 (08-30-19 @ 20:28), Max: 99.1 (08-30-19 @ 17:15)  HR: 68 (08-30-19 @ 20:28) (68 - 96)  BP: 115/59 (08-30-19 @ 20:28) (97/45 - 115/59)  ABP: 108/44 (08-30-19 @ 16:00) (84/38 - 134/54)  ABP(mean): 64 (08-30-19 @ 16:00) (54 - 96)  RR: 18 (08-30-19 @ 20:28) (12 - 34)  SpO2: 94% (08-30-19 @ 20:28) (92% - 96%)      08-29-19 @ 07:01  -  08-30-19 @ 07:00  --------------------------------------------------------  IN:    IV PiggyBack: 50 mL    sodium chloride 0.9%: 1100 mL  Total IN: 1150 mL    OUT:  Total OUT: 0 mL    Total NET: 1150 mL      08-30-19 @ 07:01  -  08-31-19 @ 01:49  --------------------------------------------------------  IN:    Lactated Ringers IV Bolus: 250 mL    Oral Fluid: 720 mL    sodium chloride 0.9%: 200 mL    sodium chloride 0.9%: 100 mL  Total IN: 1270 mL    OUT:    Voided: 350 mL  Total OUT: 350 mL    Total NET: 920 mL        GI proph:  pantoprazole    Tablet 40 milliGRAM(s) Oral before breakfast    AC/ proph: aspirin enteric coated 81 milliGRAM(s) Oral daily  heparin  Injectable 5000 Unit(s) SubCutaneous every 8 hours    ABx:     PHYSICAL EXAM:  GENERAL: NAD, well-appearing  HEENT: L neck dressing CDI, small amount of swelling in the area, no evidence of hematoma  CHEST/LUNG: Clear to auscultation bilaterally  HEART: Regular rate and rhythm  ABDOMEN: Soft, Nontender, Nondistended;   EXTREMITIES:  No clubbing, cyanosis, or edema      LABS  Labs:  CAPILLARY BLOOD GLUCOSE                              10.8   12.71 )-----------( 254      ( 30 Aug 2019 04:45 )             32.6         08-30    140  |  106  |  11  ----------------------------<  112<H>  4.2   |  23  |  0.7      Calcium, Total Serum: 8.4 mg/dL (08-30-19 @ 04:45)      LFTs:             6.5  | <0.2 | 38       ------------------[110     ( 29 Aug 2019 06:03 )  3.7  | x    | 31          Lipase:x      Amylase:x             Coags:     13.10  ----< 1.14    ( 30 Aug 2019 04:45 )     27.2        CARDIAC MARKERS ( 30 Aug 2019 04:45 )  x     / <0.01 ng/mL / 83 U/L / x     / 1.4 ng/mL  CARDIAC MARKERS ( 29 Aug 2019 23:30 )  x     / <0.01 ng/mL / 82 U/L / x     / 1.2 ng/mL  CARDIAC MARKERS ( 29 Aug 2019 17:20 )  x     / <0.01 ng/mL / 65 U/L / x     / <1.0 ng/mL

## 2019-08-31 NOTE — PROGRESS NOTE ADULT - ASSESSMENT
Problem: Safety  Goal: Will remain free from falls  Patient is not deemed a fall risk. Patient educated to call for assistance when necessary. Call light left within reach of patient.     Problem: Infection  Goal: Will remain free from infection    Intervention: Implement standard precautions and perform hand washing before and after patient contact  Hand hygiene performed prior to and after entering pt room. Gloves worn during patient interactions.            This is a 78y Female with h/o htn, CAD, HLD presents w/ dizziness and ataxia found to have L ICA moderate stenosis (60-69%) with chronic lacunar infarcts on MRI. POD 2 from L CEA,.    Plan   Regular diet   dressing change   ambulating  poss d/c today

## 2019-09-01 RX ORDER — FAMOTIDINE 10 MG/ML
20 INJECTION INTRAVENOUS ONCE
Refills: 0 | Status: COMPLETED | OUTPATIENT
Start: 2019-09-01 | End: 2019-09-01

## 2019-09-01 RX ORDER — FAMOTIDINE 10 MG/ML
20 INJECTION INTRAVENOUS ONCE
Refills: 0 | Status: COMPLETED | OUTPATIENT
Start: 2019-09-01 | End: 2019-09-02

## 2019-09-01 RX ORDER — IBUPROFEN 200 MG
200 TABLET ORAL ONCE
Refills: 0 | Status: COMPLETED | OUTPATIENT
Start: 2019-09-01 | End: 2019-09-01

## 2019-09-01 RX ORDER — LANOLIN ALCOHOL/MO/W.PET/CERES
3 CREAM (GRAM) TOPICAL AT BEDTIME
Refills: 0 | Status: DISCONTINUED | OUTPATIENT
Start: 2019-09-01 | End: 2019-09-05

## 2019-09-01 RX ADMIN — Medication 100 MILLIGRAM(S): at 21:25

## 2019-09-01 RX ADMIN — PANTOPRAZOLE SODIUM 40 MILLIGRAM(S): 20 TABLET, DELAYED RELEASE ORAL at 05:51

## 2019-09-01 RX ADMIN — CLOPIDOGREL BISULFATE 75 MILLIGRAM(S): 75 TABLET, FILM COATED ORAL at 13:04

## 2019-09-01 RX ADMIN — Medication 200 MILLIGRAM(S): at 23:29

## 2019-09-01 RX ADMIN — HEPARIN SODIUM 5000 UNIT(S): 5000 INJECTION INTRAVENOUS; SUBCUTANEOUS at 21:25

## 2019-09-01 RX ADMIN — OXYCODONE AND ACETAMINOPHEN 1 TABLET(S): 5; 325 TABLET ORAL at 09:30

## 2019-09-01 RX ADMIN — ATORVASTATIN CALCIUM 40 MILLIGRAM(S): 80 TABLET, FILM COATED ORAL at 21:25

## 2019-09-01 RX ADMIN — Medication 100 MILLIGRAM(S): at 05:51

## 2019-09-01 RX ADMIN — FAMOTIDINE 20 MILLIGRAM(S): 10 INJECTION INTRAVENOUS at 04:13

## 2019-09-01 RX ADMIN — FAMOTIDINE 20 MILLIGRAM(S): 10 INJECTION INTRAVENOUS at 23:27

## 2019-09-01 RX ADMIN — Medication 81 MILLIGRAM(S): at 13:04

## 2019-09-01 RX ADMIN — OXYCODONE AND ACETAMINOPHEN 1 TABLET(S): 5; 325 TABLET ORAL at 08:32

## 2019-09-01 RX ADMIN — Medication 100 MILLIGRAM(S): at 13:04

## 2019-09-01 RX ADMIN — OXYCODONE AND ACETAMINOPHEN 1 TABLET(S): 5; 325 TABLET ORAL at 02:44

## 2019-09-01 RX ADMIN — HEPARIN SODIUM 5000 UNIT(S): 5000 INJECTION INTRAVENOUS; SUBCUTANEOUS at 05:51

## 2019-09-01 RX ADMIN — HEPARIN SODIUM 5000 UNIT(S): 5000 INJECTION INTRAVENOUS; SUBCUTANEOUS at 13:05

## 2019-09-02 LAB
ANION GAP SERPL CALC-SCNC: 11 MMOL/L — SIGNIFICANT CHANGE UP (ref 7–14)
BASOPHILS # BLD AUTO: 0.01 K/UL — SIGNIFICANT CHANGE UP (ref 0–0.2)
BASOPHILS NFR BLD AUTO: 0.1 % — SIGNIFICANT CHANGE UP (ref 0–1)
BUN SERPL-MCNC: 6 MG/DL — LOW (ref 10–20)
CALCIUM SERPL-MCNC: 8.6 MG/DL — SIGNIFICANT CHANGE UP (ref 8.5–10.1)
CHLORIDE SERPL-SCNC: 104 MMOL/L — SIGNIFICANT CHANGE UP (ref 98–110)
CO2 SERPL-SCNC: 25 MMOL/L — SIGNIFICANT CHANGE UP (ref 17–32)
CREAT SERPL-MCNC: 0.5 MG/DL — LOW (ref 0.7–1.5)
EOSINOPHIL # BLD AUTO: 0.16 K/UL — SIGNIFICANT CHANGE UP (ref 0–0.7)
EOSINOPHIL NFR BLD AUTO: 2.2 % — SIGNIFICANT CHANGE UP (ref 0–8)
GLUCOSE SERPL-MCNC: 96 MG/DL — SIGNIFICANT CHANGE UP (ref 70–99)
HCT VFR BLD CALC: 29.6 % — LOW (ref 37–47)
HGB BLD-MCNC: 9.9 G/DL — LOW (ref 12–16)
IMM GRANULOCYTES NFR BLD AUTO: 0.4 % — HIGH (ref 0.1–0.3)
LYMPHOCYTES # BLD AUTO: 2.03 K/UL — SIGNIFICANT CHANGE UP (ref 1.2–3.4)
LYMPHOCYTES # BLD AUTO: 28 % — SIGNIFICANT CHANGE UP (ref 20.5–51.1)
MAGNESIUM SERPL-MCNC: 1.9 MG/DL — SIGNIFICANT CHANGE UP (ref 1.8–2.4)
MCHC RBC-ENTMCNC: 31 PG — SIGNIFICANT CHANGE UP (ref 27–31)
MCHC RBC-ENTMCNC: 33.4 G/DL — SIGNIFICANT CHANGE UP (ref 32–37)
MCV RBC AUTO: 92.8 FL — SIGNIFICANT CHANGE UP (ref 81–99)
MONOCYTES # BLD AUTO: 0.81 K/UL — HIGH (ref 0.1–0.6)
MONOCYTES NFR BLD AUTO: 11.2 % — HIGH (ref 1.7–9.3)
NEUTROPHILS # BLD AUTO: 4.22 K/UL — SIGNIFICANT CHANGE UP (ref 1.4–6.5)
NEUTROPHILS NFR BLD AUTO: 58.1 % — SIGNIFICANT CHANGE UP (ref 42.2–75.2)
NRBC # BLD: 0 /100 WBCS — SIGNIFICANT CHANGE UP (ref 0–0)
PHOSPHATE SERPL-MCNC: 3.8 MG/DL — SIGNIFICANT CHANGE UP (ref 2.1–4.9)
PLATELET # BLD AUTO: 232 K/UL — SIGNIFICANT CHANGE UP (ref 130–400)
POTASSIUM SERPL-MCNC: 3.8 MMOL/L — SIGNIFICANT CHANGE UP (ref 3.5–5)
POTASSIUM SERPL-SCNC: 3.8 MMOL/L — SIGNIFICANT CHANGE UP (ref 3.5–5)
RBC # BLD: 3.19 M/UL — LOW (ref 4.2–5.4)
RBC # FLD: 13.3 % — SIGNIFICANT CHANGE UP (ref 11.5–14.5)
SODIUM SERPL-SCNC: 140 MMOL/L — SIGNIFICANT CHANGE UP (ref 135–146)
WBC # BLD: 7.26 K/UL — SIGNIFICANT CHANGE UP (ref 4.8–10.8)
WBC # FLD AUTO: 7.26 K/UL — SIGNIFICANT CHANGE UP (ref 4.8–10.8)

## 2019-09-02 RX ORDER — FAMOTIDINE 10 MG/ML
20 INJECTION INTRAVENOUS ONCE
Refills: 0 | Status: COMPLETED | OUTPATIENT
Start: 2019-09-02 | End: 2019-09-02

## 2019-09-02 RX ORDER — CALCIUM CARBONATE 500(1250)
1 TABLET ORAL ONCE
Refills: 0 | Status: COMPLETED | OUTPATIENT
Start: 2019-09-02 | End: 2019-09-02

## 2019-09-02 RX ADMIN — Medication 81 MILLIGRAM(S): at 11:41

## 2019-09-02 RX ADMIN — Medication 3 MILLIGRAM(S): at 21:40

## 2019-09-02 RX ADMIN — PANTOPRAZOLE SODIUM 40 MILLIGRAM(S): 20 TABLET, DELAYED RELEASE ORAL at 05:29

## 2019-09-02 RX ADMIN — FAMOTIDINE 20 MILLIGRAM(S): 10 INJECTION INTRAVENOUS at 11:45

## 2019-09-02 RX ADMIN — HEPARIN SODIUM 5000 UNIT(S): 5000 INJECTION INTRAVENOUS; SUBCUTANEOUS at 05:29

## 2019-09-02 RX ADMIN — Medication 25 MILLIGRAM(S): at 11:41

## 2019-09-02 RX ADMIN — HEPARIN SODIUM 5000 UNIT(S): 5000 INJECTION INTRAVENOUS; SUBCUTANEOUS at 21:40

## 2019-09-02 RX ADMIN — FAMOTIDINE 20 MILLIGRAM(S): 10 INJECTION INTRAVENOUS at 22:15

## 2019-09-02 RX ADMIN — Medication 100 MILLIGRAM(S): at 21:40

## 2019-09-02 RX ADMIN — Medication 100 MILLIGRAM(S): at 05:28

## 2019-09-02 RX ADMIN — ATORVASTATIN CALCIUM 40 MILLIGRAM(S): 80 TABLET, FILM COATED ORAL at 21:40

## 2019-09-02 RX ADMIN — Medication 200 MILLIGRAM(S): at 00:21

## 2019-09-02 RX ADMIN — Medication 1 TABLET(S): at 23:41

## 2019-09-02 RX ADMIN — CHLORHEXIDINE GLUCONATE 1 APPLICATION(S): 213 SOLUTION TOPICAL at 05:29

## 2019-09-02 RX ADMIN — CLOPIDOGREL BISULFATE 75 MILLIGRAM(S): 75 TABLET, FILM COATED ORAL at 11:42

## 2019-09-02 RX ADMIN — ONDANSETRON 4 MILLIGRAM(S): 8 TABLET, FILM COATED ORAL at 23:02

## 2019-09-02 RX ADMIN — HEPARIN SODIUM 5000 UNIT(S): 5000 INJECTION INTRAVENOUS; SUBCUTANEOUS at 13:41

## 2019-09-02 NOTE — PROGRESS NOTE ADULT - ASSESSMENT
This is a 78y Female with h/o htn, CAD, HLD presents w/ dizziness and ataxia found to have L ICA moderate stenosis (60-69%) with chronic lacunar infarcts on MRI. s/p  L CEA    Plan:  Regular diet   dressing change   PT and Physiatry  poss d/c today

## 2019-09-02 NOTE — PROGRESS NOTE ADULT - SUBJECTIVE AND OBJECTIVE BOX
GENERAL SURGERY PROGRESS NOTE     MARCO A LAGUNA  [address] [place] [address] day :10d  POD:  Procedure: Left carotid endarterectomy    Surgical Attending: Erasmo Olivarez  Overnight events: Episode of unsteady gait yesterday. Doing better now.    T(F): 98.3 (09-02-19 @ 04:56), Max: 98.3 (09-02-19 @ 04:56)  HR: 82 (09-02-19 @ 04:56) (78 - 94)  BP: 144/64 (09-02-19 @ 04:56) (113/58 - 144/64)  ABP: --  ABP(mean): --  RR: 18 (09-02-19 @ 04:56) (18 - 18)  SpO2: 94% (09-01-19 @ 08:10) (94% - 94%)      DIET/FLUIDS:   GI proph:  famotidine Injectable 20 milliGRAM(s) IV Push once  pantoprazole    Tablet 40 milliGRAM(s) Oral before breakfast    AC/ proph: aspirin enteric coated 81 milliGRAM(s) Oral daily  heparin  Injectable 5000 Unit(s) SubCutaneous every 8 hours    ABx:     PHYSICAL EXAM:  GENERAL: NAD, well-appearing  HEENT: L neck dressing CDI, small amount of swelling in the area, no evidence of hematoma  CHEST/LUNG: Clear to auscultation bilaterally  HEART: Regular rate and rhythm  ABDOMEN: Soft, Nontender, Nondistended;   EXTREMITIES:  No clubbing, cyanosis, or edema      LABS  Labs:  CAPILLARY BLOOD GLUCOSE                              9.9    7.26  )-----------( 232      ( 02 Sep 2019 06:09 )             29.6       Auto Neutrophil %: 58.1 % (09-02-19 @ 06:09)  Auto Immature Granulocyte %: 0.4 % (09-02-19 @ 06:09)    09-02    140  |  104  |  6<L>  ----------------------------<  96  3.8   |  25  |  0.5<L>      Calcium, Total Serum: 8.6 mg/dL (09-02-19 @ 06:09)      CARDIAC MARKERS ( 31 Aug 2019 08:50 )  x     / <0.01 ng/mL / 116 U/L / x     / 2.1 ng/mL

## 2019-09-02 NOTE — CHART NOTE - NSCHARTNOTEFT_GEN_A_CORE
Surgery Transfer Note    Transfer from: Surgery  Transfer to:  (x) Medicine    (  ) Telemetry    (  ) RCU    (  ) Palliative    (  ) Stroke Unit    (  ) _______________  Accepting physican:      Hospital COURSE:  s/p left cea, symptomatic    Overnight patient complained of frontal band-like headache, poorly controlled with PO Percocet. Patient also complains of dizziness with sense of room spinning.    Patient also complains of worsening hearthburn. Epigastric pain not improving with PPI.    Currently no acute surgical issues.    PE:  -left cea incision site soft, no active signs of bleeding  -cranial nerves 2-12 intact  -no pronator drift b/l extremities  -no apshasia      ASSESSMENT & PLAN:   This is a 78y Female with h/o htn, CAD, HLD presents w/ dizziness and ataxia found to have L ICA moderate stenosis (60-69%) with chronic lacunar infarcts on MRI. s/p  L CEA    Plan:  Regular diet  no acute surgical interventions    For Follow-Up:  Consider GI consult  Consider Neurology consult    Vital Signs Last 24 Hrs  T(C): 36.8 (02 Sep 2019 04:56), Max: 36.8 (02 Sep 2019 04:56)  T(F): 98.3 (02 Sep 2019 04:56), Max: 98.3 (02 Sep 2019 04:56)  HR: 75 (02 Sep 2019 11:26) (75 - 94)  BP: 135/66 (02 Sep 2019 11:26) (135/66 - 144/64)  BP(mean): --  RR: 18 (02 Sep 2019 11:26) (18 - 18)  SpO2: 99% (02 Sep 2019 11:26) (99% - 99%)  I&O's Summary    MEDICATIONS  (STANDING):  aspirin enteric coated 81 milliGRAM(s) Oral daily  atorvastatin 40 milliGRAM(s) Oral at bedtime  chlorhexidine 4% Liquid 1 Application(s) Topical <User Schedule>  clopidogrel Tablet 75 milliGRAM(s) Oral daily  docusate sodium 100 milliGRAM(s) Oral three times a day  heparin  Injectable 5000 Unit(s) SubCutaneous every 8 hours  melatonin 3 milliGRAM(s) Oral at bedtime  pantoprazole    Tablet 40 milliGRAM(s) Oral before breakfast    MEDICATIONS  (PRN):  acetaminophen   Tablet .. 650 milliGRAM(s) Oral every 6 hours PRN Temp greater or equal to 38C (100.4F), Mild Pain (1 - 3)  benzocaine 20% Spray 1 Spray(s) Topical three times a day PRN throat pain  meclizine 25 milliGRAM(s) Oral every 8 hours PRN Dizziness  ondansetron Injectable 4 milliGRAM(s) IV Push every 6 hours PRN Nausea  oxyCODONE    5 mG/acetaminophen 325 mG 1 Tablet(s) Oral every 4 hours PRN Moderate Pain (4 - 6)  oxyCODONE    5 mG/acetaminophen 325 mG 2 Tablet(s) Oral every 6 hours PRN Severe Pain (7 - 10)  senna 2 Tablet(s) Oral daily PRN Constipation        LABS                                            9.9                   Neurophils% (auto):   58.1   (09-02 @ 06:09):    7.26 )-----------(232          Lymphocytes% (auto):  28.0                                          29.6                   Eosinphils% (auto):   2.2      Manual%: Neutrophils x    ; Lymphocytes x    ; Eosinophils x    ; Bands%: x    ; Blasts x                                    140    |  104    |  6                   Calcium: 8.6   / iCa: x      (09-02 @ 06:09)    ----------------------------<  96        Magnesium: 1.9                              3.8     |  25     |  0.5              Phosphorous: 3.8 Surgery Transfer Note    Transfer from: Surgery  Transfer to:  (x) Medicine    (  ) Telemetry    (  ) RCU    (  ) Palliative    (  ) Stroke Unit    (  ) _______________  Accepting physican: Dr. Shea      [facility] COURSE:  s/p left cea, symptomatic    Overnight patient complained of frontal band-like headache, poorly controlled with PO Percocet. Patient also complains of dizziness with sense of room spinning.    Patient also complains of worsening hearthburn. Epigastric pain not improving with PPI.    Currently no acute surgical issues.    PE:  -left cea incision site soft, no active signs of bleeding  -cranial nerves 2-12 intact  -no pronator drift b/l extremities  -no apshasia      ASSESSMENT & PLAN:   This is a 78y Female with h/o htn, CAD, HLD presents w/ dizziness and ataxia found to have L ICA moderate stenosis (60-69%) with chronic lacunar infarcts on MRI. s/p  L CEA    Plan:  Regular diet  no acute surgical interventions    For Follow-Up:  Consider GI consult  Consider Neurology consult    Vital Signs Last 24 Hrs  T(C): 36.8 (02 Sep 2019 04:56), Max: 36.8 (02 Sep 2019 04:56)  T(F): 98.3 (02 Sep 2019 04:56), Max: 98.3 (02 Sep 2019 04:56)  HR: 75 (02 Sep 2019 11:26) (75 - 94)  BP: 135/66 (02 Sep 2019 11:26) (135/66 - 144/64)  BP(mean): --  RR: 18 (02 Sep 2019 11:26) (18 - 18)  SpO2: 99% (02 Sep 2019 11:26) (99% - 99%)  I&O's Summary    MEDICATIONS  (STANDING):  aspirin enteric coated 81 milliGRAM(s) Oral daily  atorvastatin 40 milliGRAM(s) Oral at bedtime  chlorhexidine 4% Liquid 1 Application(s) Topical <User Schedule>  clopidogrel Tablet 75 milliGRAM(s) Oral daily  docusate sodium 100 milliGRAM(s) Oral three times a day  heparin  Injectable 5000 Unit(s) SubCutaneous every 8 hours  melatonin 3 milliGRAM(s) Oral at bedtime  pantoprazole    Tablet 40 milliGRAM(s) Oral before breakfast    MEDICATIONS  (PRN):  acetaminophen   Tablet .. 650 milliGRAM(s) Oral every 6 hours PRN Temp greater or equal to 38C (100.4F), Mild Pain (1 - 3)  benzocaine 20% Spray 1 Spray(s) Topical three times a day PRN throat pain  meclizine 25 milliGRAM(s) Oral every 8 hours PRN Dizziness  ondansetron Injectable 4 milliGRAM(s) IV Push every 6 hours PRN Nausea  oxyCODONE    5 mG/acetaminophen 325 mG 1 Tablet(s) Oral every 4 hours PRN Moderate Pain (4 - 6)  oxyCODONE    5 mG/acetaminophen 325 mG 2 Tablet(s) Oral every 6 hours PRN Severe Pain (7 - 10)  senna 2 Tablet(s) Oral daily PRN Constipation        LABS                                            9.9                   Neurophils% (auto):   58.1   (09-02 @ 06:09):    7.26 )-----------(232          Lymphocytes% (auto):  28.0                                          29.6                   Eosinphils% (auto):   2.2      Manual%: Neutrophils x    ; Lymphocytes x    ; Eosinophils x    ; Bands%: x    ; Blasts x                                    140    |  104    |  6                   Calcium: 8.6   / iCa: x      (09-02 @ 06:09)    ----------------------------<  96        Magnesium: 1.9                              3.8     |  25     |  0.5              Phosphorous: 3.8

## 2019-09-02 NOTE — CONSULT NOTE ADULT - SUBJECTIVE AND OBJECTIVE BOX
Patient is a 78y old  Female who presents with a chief complaint of vertigo, ataxia (02 Sep 2019 07:23)    HPI:  78 years old female known to have HTN, DLD, CAD s/p 2 cardiac stents on Plavix and ASA, Cataract, s/p  cholecystectomy/tonsillectomy/hernia repair/ovarian cystectomy presented to ED for vertigo and ataxia x 1 day.  As Per pt, yesterday morning when she tried getting out of the bed in the morning, she suddenly started experiencing sensation of everything spinning around. It did not resolve with rest. It lasted the whole day yesterday and pt could not ambulate due to continued vertigo. Also endorses diffuses headache of same duration, dull, constant. Pt states she has been having ringing sensation in her ears for past few years, not associated with gait instability or vertigo.  Denied Nausea, vomiting, blurring of vision, focal weakness, chest pain, palpitations, photophobia, aura, recent Abx use, recent URTI, syncopal episode, LOC etc.    In the ED, Pt was given ivf and meclizine-dizziness improved however still has gait disturbance requiring assistance due to ataxic type gait. At baseline pt walks completely independent. Pt was sen by neurology, CTH did not show any acute pathology, MRI head/neck ordered (23 Aug 2019 03:58)      PAST MEDICAL & SURGICAL HISTORY:  Cataract  CAD (coronary artery disease)  HLD (hyperlipidemia)  HTN (hypertension)  H/O ovarian cystectomy  H/O hernia repair  History of tonsillectomy  History of cholecystectomy  History of surgery: cardiac stents x2      Hospital Course: Seen by Grzegorz SP Left carotid endarterectomy    TODAY'S SUBJECTIVE & REVIEW OF SYMPTOMS:     Constitutional WNL   Cardio WNL   Resp WNL   GI WNL  Heme WNL  Endo WNL  Skin WNL  MSK WNL  Neuro WNL  Cognitive WNL  Psych WNL      MEDICATIONS  (STANDING):  aspirin enteric coated 81 milliGRAM(s) Oral daily  atorvastatin 40 milliGRAM(s) Oral at bedtime  chlorhexidine 4% Liquid 1 Application(s) Topical <User Schedule>  clopidogrel Tablet 75 milliGRAM(s) Oral daily  docusate sodium 100 milliGRAM(s) Oral three times a day  heparin  Injectable 5000 Unit(s) SubCutaneous every 8 hours  melatonin 3 milliGRAM(s) Oral at bedtime  pantoprazole    Tablet 40 milliGRAM(s) Oral before breakfast    MEDICATIONS  (PRN):  acetaminophen   Tablet .. 650 milliGRAM(s) Oral every 6 hours PRN Temp greater or equal to 38C (100.4F), Mild Pain (1 - 3)  benzocaine 20% Spray 1 Spray(s) Topical three times a day PRN throat pain  meclizine 25 milliGRAM(s) Oral every 8 hours PRN Dizziness  ondansetron Injectable 4 milliGRAM(s) IV Push every 6 hours PRN Nausea  oxyCODONE    5 mG/acetaminophen 325 mG 1 Tablet(s) Oral every 4 hours PRN Moderate Pain (4 - 6)  oxyCODONE    5 mG/acetaminophen 325 mG 2 Tablet(s) Oral every 6 hours PRN Severe Pain (7 - 10)  senna 2 Tablet(s) Oral daily PRN Constipation      FAMILY HISTORY:  FH: hypertension (Mother): mother  FH: Alzheimers disease (Mother): mother      Allergies    No Known Allergies    Intolerances        SOCIAL HISTORY:    [    ] Etoh  [    ] Smoking  [    ] Substance abuse     Home Environment:  [    ] Home Alone  [  d  ] Lives with Family DAUGHTER  [    ] Home Health Aid    Dwelling:  [ x   ] Apartment  [    ] Private House  [    ] Adult Home  [    ] Skilled Nursing Facility      [    ] Short Term  [    ] Long Term  [    ] Stairs                           [ x   ] Elevator     FUNCTIONAL STATUS PTA: (Check all that apply)  Ambulation: [  x   ]Independent    [    ] Dependent     [    ] Non-Ambulatory  Assistive Device: [    ] SA Cane  [    ]  Q Cane  [    ] Walker  [    ]  Wheelchair  ADL : [  x  ] Independent  [    ]  Dependent   HAS DEVICES TO USE BUT DOESNT USE THEM    Vital Signs Last 24 Hrs  T(C): 36.8 (02 Sep 2019 04:56), Max: 36.8 (02 Sep 2019 04:56)  T(F): 98.3 (02 Sep 2019 04:56), Max: 98.3 (02 Sep 2019 04:56)  HR: 75 (02 Sep 2019 11:26) (75 - 94)  BP: 135/66 (02 Sep 2019 11:26) (135/66 - 144/64)  BP(mean): --  RR: 18 (02 Sep 2019 11:26) (18 - 18)  SpO2: 99% (02 Sep 2019 11:26) (99% - 99%)      PHYSICAL EXAM: Alert & Oriented X3  GENERAL: NAD, well-groomed, well-developed  HEAD:  Atraumatic, Normocephalic  EYES: EOMI, PERRLA, conjunctiva and sclera clear  NECK: Supple L neck incision bandaged  CHEST/LUNG: Clear bilaterally  HEART: Regular rate and rhythm  ABDOMEN: Soft, Nontender, Nondistended; Bowel sounds present  EXTREMITIES:  no calf tenderness,no edema BLES    NERVOUS SYSTEM:  Cranial Nerves 2-12 intact [  x  ] Abnormal  [    ]  ROM: WFL all extremities [x    ]  Abnormal [     ]  Motor Strength: WFL all extremities  [    x]  Abnormal [    ]  Sensation: intact to light touch [x    ] Abnormal [    ] NO Nystagmus no drift    FUNCTIONAL STATUS:  Bed Mobility: [ x  ]  Independent [    ]  Supervision [    ]  Needs Assistance [  ]  N/A  Transfers: [ x   ]  Independent [    ]  Supervision [    ]  Needs Assistance [    ]  N/A    Ambulation:  [   x ]  Independent [    ]  Supervision [    ]  Needs Assistance [    ]  N/A   ADL:  [  x  ]   Independent [    ] Requires Assistance [    ] N/A       LABS:                        9.9    7.26  )-----------( 232      ( 02 Sep 2019 06:09 )             29.6     09-02    140  |  104  |  6<L>  ----------------------------<  96  3.8   |  25  |  0.5<L>    Ca    8.6      02 Sep 2019 06:09  Phos  3.8     09-02  Mg     1.9     09-02            RADIOLOGY & ADDITIONAL STUDIES:

## 2019-09-02 NOTE — CONSULT NOTE ADULT - CONSULT REASON
Ataxia
Dizziness/Vertigo
Left proximal ICA moderate stenosis
s/p left cea, symptomatic
Debilitation
vertigo, ataxia

## 2019-09-02 NOTE — CONSULT NOTE ADULT - ASSESSMENT
IMPRESSION: Rehab of gait ab sp L  CEA    PRECAUTIONS: [   x ] Cardiac  [    ] Respiratory  [    ] Seizures [    ] Contact Isolation  [    ] Droplet Isolation  [    ] Other    Weight Bearing Status:     RECOMMENDATION:    Out of Bed to Chair     DVT/Decubiti Prophylaxis    REHAB PLAN:     [     ] Bedside P/T 3-5 times a week   [     ] Bedside O/T  2-3 times a week   [    x ] No Rehab Therapy Indicated   [     ]  Speech Therapy   Conditioning/ROM                                 ADL  Bed Mobility                                            Conditioning/ROM  Transfers                                                  Bed Mobility  Sitting /Standing Balance                      Transfers                                        Gait Training                                            Sitting/Standing Balance  Stair Training [   ]Applicable                 Home equipment Eval                                                                     Splinting  [   ] Only      GOALS:   ADL   [    ]   Independent         Transfers  [    ] Independent            Ambulation  [     ] Independent     [     ] With device                            [    ]  CG                                               [    ]  CG                                                    [     ] CG                            [    ] Min A                                          [    ] Min A                                                [     ] Min  A          DISCHARGE PLAN:   [     ]  Good candidate for Intensive Rehabilitation/Hospital based                                             Will tolerate 3hrs Intensive Rehab Daily                                       [      ]  Short Term Rehab in Skilled Nursing Facility                                       [   x   ]  Home with Outpatient or VN services DC HOME- REFUSES VN SERVICES- SOC SERVICE TO SEE                                         [      ]  Possible Candidate for Intensive Hospital based Rehab

## 2019-09-02 NOTE — CONSULT NOTE ADULT - CONSULT REQUESTED DATE/TIME
23-Aug-2019 00:20
26-Aug-2019 12:50
26-Aug-2019 16:22
29-Aug-2019 17:24
02-Sep-2019 16:03
26-Aug-2019 09:42

## 2019-09-03 RX ORDER — DOCUSATE SODIUM 100 MG
100 CAPSULE ORAL
Refills: 0 | Status: DISCONTINUED | OUTPATIENT
Start: 2019-09-03 | End: 2019-09-04

## 2019-09-03 RX ORDER — SENNA PLUS 8.6 MG/1
2 TABLET ORAL AT BEDTIME
Refills: 0 | Status: DISCONTINUED | OUTPATIENT
Start: 2019-09-03 | End: 2019-09-05

## 2019-09-03 RX ADMIN — CLOPIDOGREL BISULFATE 75 MILLIGRAM(S): 75 TABLET, FILM COATED ORAL at 12:54

## 2019-09-03 RX ADMIN — HEPARIN SODIUM 5000 UNIT(S): 5000 INJECTION INTRAVENOUS; SUBCUTANEOUS at 13:05

## 2019-09-03 RX ADMIN — Medication 30 MILLILITER(S): at 13:04

## 2019-09-03 RX ADMIN — Medication 100 MILLIGRAM(S): at 13:04

## 2019-09-03 RX ADMIN — Medication 650 MILLIGRAM(S): at 08:21

## 2019-09-03 RX ADMIN — SENNA PLUS 2 TABLET(S): 8.6 TABLET ORAL at 21:56

## 2019-09-03 RX ADMIN — Medication 100 MILLIGRAM(S): at 05:18

## 2019-09-03 RX ADMIN — Medication 81 MILLIGRAM(S): at 12:54

## 2019-09-03 RX ADMIN — Medication 100 MILLIGRAM(S): at 21:56

## 2019-09-03 RX ADMIN — Medication 3 MILLIGRAM(S): at 21:56

## 2019-09-03 RX ADMIN — PANTOPRAZOLE SODIUM 40 MILLIGRAM(S): 20 TABLET, DELAYED RELEASE ORAL at 05:19

## 2019-09-03 RX ADMIN — SENNA PLUS 2 TABLET(S): 8.6 TABLET ORAL at 13:01

## 2019-09-03 RX ADMIN — HEPARIN SODIUM 5000 UNIT(S): 5000 INJECTION INTRAVENOUS; SUBCUTANEOUS at 05:18

## 2019-09-03 RX ADMIN — HEPARIN SODIUM 5000 UNIT(S): 5000 INJECTION INTRAVENOUS; SUBCUTANEOUS at 21:56

## 2019-09-03 RX ADMIN — Medication 650 MILLIGRAM(S): at 10:02

## 2019-09-03 RX ADMIN — ATORVASTATIN CALCIUM 40 MILLIGRAM(S): 80 TABLET, FILM COATED ORAL at 21:56

## 2019-09-03 RX ADMIN — Medication 30 MILLILITER(S): at 05:25

## 2019-09-03 NOTE — PROGRESS NOTE ADULT - ASSESSMENT
Assessment:  78y Female patient admitted S/P Left CEA , with the above physical exam, labs, and imaging findings.    Plan:  -Transfer to medicine  -Headache workup  -Epigastric pain work up  -Pain control as needed  -Hemodynamic monitoring as per routine  -Encourage ambulation and incentive spirometer use (10x/hr when awake)  -GI and DVT prophylaxis  -Strict input and output monitoring  -Continue current management    Date/Time: 09-03-19 @ 03:41

## 2019-09-03 NOTE — CHART NOTE - NSCHARTNOTEFT_GEN_A_CORE
Registered Dietitian Follow-Up     Patient Profile Reviewed                           Yes []   No []     Nutrition History Previously Obtained        Yes []  No []       Pertinent Subjective Information: Pt continues with poor PO intake. Says she does not have an appetite and also has throat pain so she cannot eat much. Agreeable to Ensure Enlive BID     Pertinent Medical Interventions:  X L ICA moderate stenosis with chronic lacunar infarcts on MRI.   --s/p  L CEA. Vasc surgery follows     Diet order: regular     Anthropometrics:  - Ht. 63"  - Wt. 193# no changes  - %wt change  - BMI 34.2  - #+/-10%     Pertinent Lab Data: () H/H 9.9/29.6, BUN 6, Cr 0.5     Pertinent Meds: plavix, heparin, aspirin, lipitor, colace, zofran, oxycodone, protonix, senna     Physical Findings:  - Appearance: AAO  - GI function: says LBM 4-5 days ago, discussed with RN  - Tubes:  - Oral/Mouth cavity: WDL  - Skin: edema last  . surgical incision noted. BS 19     Nutrition Requirements  Weight Used: 193# ABW and 115# IBW    Energy: 1303-8720 kcal/day (MSJx1.1-1.2 AF)    Protein: 53-63 g/day (1-1.2 g/kg IBW)    Fluids: 1 mL/kcal     Nutrient Intake: not meeting needs        [] Previous Nutrition Diagnosis: inadequate oral intake            [x] Ongoing          [] Resolved    [] No active nutrition diagnosis identified at this time       Nutrition Intervention meals and snacks, med food supplements, vit/min supplementation, nutrition-related medication management  1. Continue regular diet. 2. Add Ensure Enlive BID. 3. Add daily MVI 4. Initiate bowel regimen     Goal/Expected Outcome: Pt to consume >50% of meal trays within 3 days   Pt to have at least 1 BM within 3 days    Indicator/Monitoring: RD to monitor diet order, energy intake, NFPF, body comp, glucose and renal profile    Pt at risk f/u 3 days

## 2019-09-03 NOTE — PROGRESS NOTE ADULT - SUBJECTIVE AND OBJECTIVE BOX
Progress Note: Surgery  Patient: MARCO A LAGUNA , 78y (1940)Female   MRN: [id number]  Location: [address]  Visit: 08-23-19 Inpatient  Date: 09-03-19 @ 03:41    Procedure/Diagnosis: s/p Left CEA  Events over 24h: No acute event overnight. No new complaint.       Vitals: T(F): 99.1 (09-02-19 @ 20:58), Max: 99.1 (09-02-19 @ 20:58)  HR: 85 (09-02-19 @ 20:58)  BP: 125/65 (09-02-19 @ 20:58) (125/65 - 144/64)  RR: 18 (09-02-19 @ 20:58)  SpO2: 99% (09-02-19 @ 11:26)      Diet: Diet, Regular (08-30-19 @ 08:58)    IV Fluid:     In:   Out:   Net:     Physical Examination:  General Appearance: NAD, alert and cooperative  HEENT: NCAT, WNL  Heart: S1 and S2. No murmurs. Rhythm is regular.  Lungs: Clear to auscultation BL   Abdomen: Positive bowel sounds. Soft, nondistended, .   Incisions/Wounds: Dressings in place, clean, dry and intact, no signs of infection/active bleeding/drainage    Medications: [Standing]  aspirin enteric coated 81 milliGRAM(s) Oral daily  atorvastatin 40 milliGRAM(s) Oral at bedtime  chlorhexidine 4% Liquid 1 Application(s) Topical <User Schedule>  clopidogrel Tablet 75 milliGRAM(s) Oral daily  docusate sodium 100 milliGRAM(s) Oral three times a day  heparin  Injectable 5000 Unit(s) SubCutaneous every 8 hours  melatonin 3 milliGRAM(s) Oral at bedtime  pantoprazole    Tablet 40 milliGRAM(s) Oral before breakfast    Medications:[PRN]  acetaminophen   Tablet .. 650 milliGRAM(s) Oral every 6 hours PRN  aluminum hydroxide/magnesium hydroxide/simethicone Suspension 30 milliLiter(s) Oral every 4 hours PRN  benzocaine 20% Spray 1 Spray(s) Topical three times a day PRN  meclizine 25 milliGRAM(s) Oral every 8 hours PRN  ondansetron Injectable 4 milliGRAM(s) IV Push every 6 hours PRN  oxyCODONE    5 mG/acetaminophen 325 mG 1 Tablet(s) Oral every 4 hours PRN  oxyCODONE    5 mG/acetaminophen 325 mG 2 Tablet(s) Oral every 6 hours PRN  senna 2 Tablet(s) Oral daily PRN    Labs:                        9.9    7.26  )-----------( 232      ( 02 Sep 2019 06:09 )             29.6   09-02    140  |  104  |  6<L>  ----------------------------<  96  3.8   |  25  |  0.5<L>    Ca    8.6      02 Sep 2019 06:09  Phos  3.8     09-02  Mg     1.9     09-02    Micro/Urine:    Imaging:  None/24h

## 2019-09-03 NOTE — PROGRESS NOTE ADULT - ASSESSMENT
78 years old female known to have HTN, DLD, CAD s/p 2 cardiac stents on Plavix and ASA, presents to ED for vertigo and ataxia x 1 day. As per pt, yesterday morning when she tried getting out of the bed in the morning, she suddenly started experiencing sensation of everything spinning around. Was seen by neurology and admitted for vertigo and ataxia. MR Neck showed LICA moderate stenosis. Patient now status post CEA.    # Vertigo, ataxia  - Nuclear stress test done. Unremarkable  - Neuro consult appreciated. Continuing with meclizine 25mg TID and increasing Atorvastatin to 40mg QD. Cleared.  - Carotid duplex showed >80% stenosis in LICA and 20-39% stenosis in HANANE  - MRA neck significant for moderate stenosis (60-69%) of proximal LICA + calcified atheromatous plaque in HANANE w/o significant stenosis.  - Continuing secondary stroke prevention with  mg QD and Plavix   - MRI brain: no acute infarcts or intracranial hemorrhage, chronic lacunar infarcts and chronic small vessel disease  - Vitamin B12/folate wnl  - CTH neg for acute pathology, no focal weakness  - Echo unremarkable, EF 60-65%  - Fall precautions  - No evidence of CVA and Vit B12 def, pt would likely need outpt ENT eval and vestibular rehab or REEG to rule out seizure  - Discontinuing telemetry    # Low TSH- Hypothyroidism  - TSH low (0.01), T4 normal (6.5).   - follow up Outpatient Endo      # HTN  - c/w lisinopril 20mg QD  - c/w metoprolol 25mg BID    # CAD s/p 2 stents  - c/w Aspirin 162mg QD - can hold before procedure   - c/w Metoprolol 25mg BID  - c/w Plavix 75mg QD - can hold before procedure   - c/w Atorvastatin 40mg QD     # DLD  - c/w Atorvastatin 40mg QD    DVT PPx: Lovenox  GI PPx: not indicated  Diet: dash  Activity: fall precautions 78 years old female known to have HTN, DLD, CAD s/p 2 cardiac stents on Plavix and ASA, presents to ED for vertigo and ataxia x 1 day. As per pt, yesterday morning when she tried getting out of the bed in the morning, she suddenly started experiencing sensation of everything spinning around. Was seen by neurology and admitted for vertigo and ataxia. MR Neck showed LICA moderate stenosis. Patient now status post CEA.    # Vertigo, ataxia  - Nuclear stress test done. Unremarkable  - Neuro consult appreciated. Continuing with meclizine 25mg TID and increasing Atorvastatin to 40mg QD. Cleared.  - MRA neck significant for moderate stenosis (60-69%) of proximal LICA + calcified atheromatous plaque in HANANE w/o significant stenosis.  - Carotid duplex showed >80% stenosis in LICA and 20-39% stenosis in HANANE  - Continuing secondary stroke prevention with  mg QD and Plavix   - MRI brain: no acute infarcts or intracranial hemorrhage, chronic lacunar infarcts and chronic small vessel disease  - Vitamin B12/folate wnl, ECHO unremarkable EF 60-65%  - CTH neg for acute pathology, no focal weakness  - No evidence of CVA and Vit B12 def, pt would likely need outpt ENT eval and vestibular rehab or REEG to rule out seizure  - Discontinuing telemetry  - S/P CEA, hemodynamically stable, incentive spirometry encouraged    # Low TSH- Hypothyroidism  - TSH low (0.01), T4 normal (6.5).   - follow up Outpatient Endo    # Headache  - band- like in quality,  3/10, waxes and wanes  - likely tension HA  - Neurology f/u as per attending  - Avoid NSAIDs given epigastric sx's    # Epigastric pain - ? GERD  - On Pantoprazole 40 mg, received single doses of Pepcid IV  - MAALOX helps with sx's  - Please hold Ibuprofen for HA  - GI follow up as per attending    # HTN  - c/w lisinopril 20mg QD  - c/w metoprolol 25mg BID    # CAD s/p 2 stents  - c/w Aspirin 162mg QD - can hold before procedure   - c/w Metoprolol 25mg BID  - c/w Plavix 75mg QD - can hold before procedure   - c/w Atorvastatin 40mg QD     # DLD  - c/w Atorvastatin 40mg QD    DVT PPx: Lovenox  GI PPx: not indicated  Diet: dash  Activity: fall precautions   Dispo: Physiatry recommends - Home with Outpatient or VN services DC HOME- REFUSES VN SERVICES- SOC SERVICE TO SEE

## 2019-09-03 NOTE — PROGRESS NOTE ADULT - SUBJECTIVE AND OBJECTIVE BOX
Patient was seen and examined. Spoke with RN. Chart reviewed.  No events overnight.  Vital Signs Last 24 Hrs  T(F): 98.9 (03 Sep 2019 05:00), Max: 99.1 (02 Sep 2019 20:58)  HR: 86 (03 Sep 2019 05:00) (75 - 86)  BP: 125/61 (03 Sep 2019 05:00) (125/61 - 135/66)  SpO2: 99% (02 Sep 2019 11:26) (99% - 99%)  MEDICATIONS  (STANDING):  aspirin enteric coated 81 milliGRAM(s) Oral daily  atorvastatin 40 milliGRAM(s) Oral at bedtime  chlorhexidine 4% Liquid 1 Application(s) Topical <User Schedule>  clopidogrel Tablet 75 milliGRAM(s) Oral daily  docusate sodium 100 milliGRAM(s) Oral three times a day  heparin  Injectable 5000 Unit(s) SubCutaneous every 8 hours  melatonin 3 milliGRAM(s) Oral at bedtime  pantoprazole    Tablet 40 milliGRAM(s) Oral before breakfast    MEDICATIONS  (PRN):  acetaminophen   Tablet .. 650 milliGRAM(s) Oral every 6 hours PRN Temp greater or equal to 38C (100.4F), Mild Pain (1 - 3)  aluminum hydroxide/magnesium hydroxide/simethicone Suspension 30 milliLiter(s) Oral every 4 hours PRN Dyspepsia  benzocaine 20% Spray 1 Spray(s) Topical three times a day PRN throat pain  meclizine 25 milliGRAM(s) Oral every 8 hours PRN Dizziness  ondansetron Injectable 4 milliGRAM(s) IV Push every 6 hours PRN Nausea  oxyCODONE    5 mG/acetaminophen 325 mG 1 Tablet(s) Oral every 4 hours PRN Moderate Pain (4 - 6)  oxyCODONE    5 mG/acetaminophen 325 mG 2 Tablet(s) Oral every 6 hours PRN Severe Pain (7 - 10)  senna 2 Tablet(s) Oral daily PRN Constipation    Labs:                        9.9    7.26  )-----------( 232      ( 02 Sep 2019 06:09 )             29.6     02 Sep 2019 06:09    140    |  104    |  6      ----------------------------<  96     3.8     |  25     |  0.5      Ca    8.6        02 Sep 2019 06:09  Phos  3.8       02 Sep 2019 06:09  Mg     1.9       02 Sep 2019 06:09            General: comfortable, NAD  Neurology: A&Ox3, nonfocal  Head:  Normocephalic, atraumatic  ENT:  Mucosa moist, no ulcerations  Neck:  Supple, no JVD,   Skin: no breakdowns (as per RN)  Resp: CTA B/L  CV: RRR, S1S2,   GI: Soft, NT, bowel sounds  MS: No edema, + peripheral pulses, FROM all 4 extremity      A/P:    DVT prophylaxis  Decubitus prevention- all measures as per RN protocol  Please call or text me with any questions or updates Patient was seen and examined. Spoke with RN. Chart reviewed. Reports epigastric pain, vertigo and new headache  No events overnight.  Vital Signs Last 24 Hrs  T(F): 98.9 (03 Sep 2019 05:00), Max: 99.1 (02 Sep 2019 20:58)  HR: 86 (03 Sep 2019 05:00) (75 - 86)  BP: 125/61 (03 Sep 2019 05:00) (125/61 - 135/66)  SpO2: 99% (02 Sep 2019 11:26) (99% - 99%)  MEDICATIONS  (STANDING):  aspirin enteric coated 81 milliGRAM(s) Oral daily  atorvastatin 40 milliGRAM(s) Oral at bedtime  chlorhexidine 4% Liquid 1 Application(s) Topical <User Schedule>  clopidogrel Tablet 75 milliGRAM(s) Oral daily  docusate sodium 100 milliGRAM(s) Oral three times a day  heparin  Injectable 5000 Unit(s) SubCutaneous every 8 hours  melatonin 3 milliGRAM(s) Oral at bedtime  pantoprazole    Tablet 40 milliGRAM(s) Oral before breakfast    MEDICATIONS  (PRN):  acetaminophen   Tablet .. 650 milliGRAM(s) Oral every 6 hours PRN Temp greater or equal to 38C (100.4F), Mild Pain (1 - 3)  aluminum hydroxide/magnesium hydroxide/simethicone Suspension 30 milliLiter(s) Oral every 4 hours PRN Dyspepsia  benzocaine 20% Spray 1 Spray(s) Topical three times a day PRN throat pain  meclizine 25 milliGRAM(s) Oral every 8 hours PRN Dizziness  ondansetron Injectable 4 milliGRAM(s) IV Push every 6 hours PRN Nausea  oxyCODONE    5 mG/acetaminophen 325 mG 1 Tablet(s) Oral every 4 hours PRN Moderate Pain (4 - 6)  oxyCODONE    5 mG/acetaminophen 325 mG 2 Tablet(s) Oral every 6 hours PRN Severe Pain (7 - 10)  senna 2 Tablet(s) Oral daily PRN Constipation    Labs:                        9.9    7.26  )-----------( 232      ( 02 Sep 2019 06:09 )             29.6     02 Sep 2019 06:09    140    |  104    |  6      ----------------------------<  96     3.8     |  25     |  0.5      Ca    8.6        02 Sep 2019 06:09  Phos  3.8       02 Sep 2019 06:09  Mg     1.9       02 Sep 2019 06:09            General:  NAD  Neurology: A&Ox3, nonfocal  Head:  Normocephalic, atraumatic  ENT:  Mucosa moist, no ulcerations  Neck:  Supple,  Skin: no breakdowns (as per RN)  Resp: CTA B/L  CV: RRR, S1S2,   GI: Soft, NT, bowel sounds  MS: No edema, + peripheral pulses, FROM all 4 extremity      A/P:  vertigo  new headache   s/p left CEA   epigastric pain     GI eval  Protonix IVP   neuro follow up, headache, vertigo   fall precautions   OOB to chair  incentive spirometry    pain control, avoid NSAIDs   discussed with the daughter and house staff   DVT prophylaxis  Decubitus prevention- all measures as per RN protocol  Please call or text me with any questions or updates

## 2019-09-03 NOTE — PROGRESS NOTE ADULT - SUBJECTIVE AND OBJECTIVE BOX
SUBJECTIVE:     Today is hospital day 11d. Complains of band like headache that is 3/10 in severity currently but waxing at waning, in addition to some epigastric pain and constipation. No fevers, no sob.    PAST MEDICAL & SURGICAL HISTORY  Cataract  CAD (coronary artery disease)  HLD (hyperlipidemia)  HTN (hypertension)  H/O ovarian cystectomy  H/O hernia repair  History of tonsillectomy  History of cholecystectomy  History of surgery: cardiac stents x2    SOCIAL HISTORY:  Negative for smoking/alcohol/drug use.     ALLERGIES:  No Known Allergies    MEDICATIONS:  STANDING MEDICATIONS  aspirin enteric coated 81 milliGRAM(s) Oral daily  atorvastatin 40 milliGRAM(s) Oral at bedtime  chlorhexidine 4% Liquid 1 Application(s) Topical <User Schedule>  clopidogrel Tablet 75 milliGRAM(s) Oral daily  docusate sodium 100 milliGRAM(s) Oral three times a day  docusate sodium 100 milliGRAM(s) Oral two times a day  heparin  Injectable 5000 Unit(s) SubCutaneous every 8 hours  melatonin 3 milliGRAM(s) Oral at bedtime  pantoprazole    Tablet 40 milliGRAM(s) Oral before breakfast  senna 2 Tablet(s) Oral at bedtime    PRN MEDICATIONS  acetaminophen   Tablet .. 650 milliGRAM(s) Oral every 6 hours PRN  aluminum hydroxide/magnesium hydroxide/simethicone Suspension 30 milliLiter(s) Oral every 4 hours PRN  benzocaine 20% Spray 1 Spray(s) Topical three times a day PRN  meclizine 25 milliGRAM(s) Oral every 8 hours PRN  ondansetron Injectable 4 milliGRAM(s) IV Push every 6 hours PRN  oxyCODONE    5 mG/acetaminophen 325 mG 1 Tablet(s) Oral every 4 hours PRN  oxyCODONE    5 mG/acetaminophen 325 mG 2 Tablet(s) Oral every 6 hours PRN  senna 2 Tablet(s) Oral daily PRN    VITALS:   T(F): 98.2  HR: 84  BP: 125/58  RR: 18  SpO2: --    LABS:                        9.9    7.26  )-----------( 232      ( 02 Sep 2019 06:09 )             29.6     09-02    140  |  104  |  6<L>  ----------------------------<  96  3.8   |  25  |  0.5<L>    Ca    8.6      02 Sep 2019 06:09  Phos  3.8     09-02  Mg     1.9     09-02      RADIOLOGY:      < from: CT Head No Cont (08.22.19 @ 21:39) >      No CT evidence of acute intracranial pathology.     Chronic microvascular ischemic changes.      < end of copied text >    < from: MR Head No Cont (08.23.19 @ 16:39) >    1.  Periventricular and subcortical white matter chronic small vessel   ischemic changes and multiple old lacunar infarcts as described above.    2.  No acute infarcts or intracranial hemorrhage.          < end of copied text >      < from: MR Angio Neck w/ IV Cont (08.23.19 @ 16:40) >  1.  Short 0.5 cm segment of moderate (60-69%) stenosis of the proximal   left internal carotid artery.    2.  Calcified atheromatous plaque proximal right ICA with no significant   stenosis.    3.  Dominant left vertebral artery.        < end of copied text >      PHYSICAL EXAM:  PHYSICAL EXAM:  GENERAL: NAD, lying in bed comfortably  HEAD:  Atraumatic, Normocephalic  EYES: EOMI, PERRLA, conjunctiva and sclera clear  ENT: Moist mucous membranes  NECK: Supple, No JVD  CHEST/LUNG: Clear to auscultation bilaterally; No rales, rhonchi, wheezing, or rubs. Unlabored respirations  HEART: Regular rate and rhythm; No murmurs, rubs, or gallops  ABDOMEN: Bowel sounds present; Soft, Nontender, Nondistended. No hepatomegally  EXTREMITIES:  2+ Peripheral Pulses, brisk capillary refill. No clubbing, cyanosis, or edema  NERVOUS SYSTEM:  Alert & Oriented X3, speech clear. No deficits   MSK: FROM all 4 extremities, full and equal strength  SKIN: No rashes or lesions

## 2019-09-04 ENCOUNTER — TRANSCRIPTION ENCOUNTER (OUTPATIENT)
Age: 79
End: 2019-09-04

## 2019-09-04 LAB
ALBUMIN SERPL ELPH-MCNC: 3.3 G/DL — LOW (ref 3.5–5.2)
ALP SERPL-CCNC: 102 U/L — SIGNIFICANT CHANGE UP (ref 30–115)
ALT FLD-CCNC: 27 U/L — SIGNIFICANT CHANGE UP (ref 0–41)
ANION GAP SERPL CALC-SCNC: 12 MMOL/L — SIGNIFICANT CHANGE UP (ref 7–14)
AST SERPL-CCNC: 38 U/L — SIGNIFICANT CHANGE UP (ref 0–41)
BASOPHILS # BLD AUTO: 0.02 K/UL — SIGNIFICANT CHANGE UP (ref 0–0.2)
BASOPHILS NFR BLD AUTO: 0.2 % — SIGNIFICANT CHANGE UP (ref 0–1)
BILIRUB SERPL-MCNC: 0.3 MG/DL — SIGNIFICANT CHANGE UP (ref 0.2–1.2)
BUN SERPL-MCNC: 7 MG/DL — LOW (ref 10–20)
CALCIUM SERPL-MCNC: 8.9 MG/DL — SIGNIFICANT CHANGE UP (ref 8.5–10.1)
CHLORIDE SERPL-SCNC: 104 MMOL/L — SIGNIFICANT CHANGE UP (ref 98–110)
CO2 SERPL-SCNC: 24 MMOL/L — SIGNIFICANT CHANGE UP (ref 17–32)
CREAT SERPL-MCNC: 0.6 MG/DL — LOW (ref 0.7–1.5)
EOSINOPHIL # BLD AUTO: 0.2 K/UL — SIGNIFICANT CHANGE UP (ref 0–0.7)
EOSINOPHIL NFR BLD AUTO: 2.3 % — SIGNIFICANT CHANGE UP (ref 0–8)
GLUCOSE SERPL-MCNC: 108 MG/DL — HIGH (ref 70–99)
HCT VFR BLD CALC: 30.4 % — LOW (ref 37–47)
HGB BLD-MCNC: 10 G/DL — LOW (ref 12–16)
IMM GRANULOCYTES NFR BLD AUTO: 0.3 % — SIGNIFICANT CHANGE UP (ref 0.1–0.3)
LYMPHOCYTES # BLD AUTO: 1.91 K/UL — SIGNIFICANT CHANGE UP (ref 1.2–3.4)
LYMPHOCYTES # BLD AUTO: 21.9 % — SIGNIFICANT CHANGE UP (ref 20.5–51.1)
MAGNESIUM SERPL-MCNC: 1.9 MG/DL — SIGNIFICANT CHANGE UP (ref 1.8–2.4)
MCHC RBC-ENTMCNC: 30.6 PG — SIGNIFICANT CHANGE UP (ref 27–31)
MCHC RBC-ENTMCNC: 32.9 G/DL — SIGNIFICANT CHANGE UP (ref 32–37)
MCV RBC AUTO: 93 FL — SIGNIFICANT CHANGE UP (ref 81–99)
MONOCYTES # BLD AUTO: 1.19 K/UL — HIGH (ref 0.1–0.6)
MONOCYTES NFR BLD AUTO: 13.7 % — HIGH (ref 1.7–9.3)
NEUTROPHILS # BLD AUTO: 5.36 K/UL — SIGNIFICANT CHANGE UP (ref 1.4–6.5)
NEUTROPHILS NFR BLD AUTO: 61.6 % — SIGNIFICANT CHANGE UP (ref 42.2–75.2)
NRBC # BLD: 0 /100 WBCS — SIGNIFICANT CHANGE UP (ref 0–0)
PLATELET # BLD AUTO: 248 K/UL — SIGNIFICANT CHANGE UP (ref 130–400)
POTASSIUM SERPL-MCNC: 4.1 MMOL/L — SIGNIFICANT CHANGE UP (ref 3.5–5)
POTASSIUM SERPL-SCNC: 4.1 MMOL/L — SIGNIFICANT CHANGE UP (ref 3.5–5)
PROT SERPL-MCNC: 6.1 G/DL — SIGNIFICANT CHANGE UP (ref 6–8)
RBC # BLD: 3.27 M/UL — LOW (ref 4.2–5.4)
RBC # FLD: 13.1 % — SIGNIFICANT CHANGE UP (ref 11.5–14.5)
SODIUM SERPL-SCNC: 140 MMOL/L — SIGNIFICANT CHANGE UP (ref 135–146)
SURGICAL PATHOLOGY STUDY: SIGNIFICANT CHANGE UP
WBC # BLD: 8.71 K/UL — SIGNIFICANT CHANGE UP (ref 4.8–10.8)
WBC # FLD AUTO: 8.71 K/UL — SIGNIFICANT CHANGE UP (ref 4.8–10.8)

## 2019-09-04 PROCEDURE — 99232 SBSQ HOSP IP/OBS MODERATE 35: CPT

## 2019-09-04 RX ORDER — PANTOPRAZOLE SODIUM 20 MG/1
1 TABLET, DELAYED RELEASE ORAL
Qty: 30 | Refills: 0
Start: 2019-09-04 | End: 2019-10-03

## 2019-09-04 RX ADMIN — Medication 100 MILLIGRAM(S): at 21:33

## 2019-09-04 RX ADMIN — PANTOPRAZOLE SODIUM 40 MILLIGRAM(S): 20 TABLET, DELAYED RELEASE ORAL at 05:54

## 2019-09-04 RX ADMIN — Medication 3 MILLIGRAM(S): at 21:33

## 2019-09-04 RX ADMIN — CLOPIDOGREL BISULFATE 75 MILLIGRAM(S): 75 TABLET, FILM COATED ORAL at 12:44

## 2019-09-04 RX ADMIN — HEPARIN SODIUM 5000 UNIT(S): 5000 INJECTION INTRAVENOUS; SUBCUTANEOUS at 21:32

## 2019-09-04 RX ADMIN — CHLORHEXIDINE GLUCONATE 1 APPLICATION(S): 213 SOLUTION TOPICAL at 05:55

## 2019-09-04 RX ADMIN — SENNA PLUS 2 TABLET(S): 8.6 TABLET ORAL at 21:33

## 2019-09-04 RX ADMIN — Medication 81 MILLIGRAM(S): at 12:44

## 2019-09-04 RX ADMIN — HEPARIN SODIUM 5000 UNIT(S): 5000 INJECTION INTRAVENOUS; SUBCUTANEOUS at 05:54

## 2019-09-04 RX ADMIN — Medication 100 MILLIGRAM(S): at 05:54

## 2019-09-04 RX ADMIN — HEPARIN SODIUM 5000 UNIT(S): 5000 INJECTION INTRAVENOUS; SUBCUTANEOUS at 13:06

## 2019-09-04 RX ADMIN — ATORVASTATIN CALCIUM 40 MILLIGRAM(S): 80 TABLET, FILM COATED ORAL at 21:33

## 2019-09-04 NOTE — PROGRESS NOTE ADULT - SUBJECTIVE AND OBJECTIVE BOX
Patient was seen and examined. Spoke with RN. Chart reviewed.  No events overnight.  Vital Signs Last 24 Hrs  T(F): 97.1 (04 Sep 2019 05:23), Max: 98.7 (03 Sep 2019 20:36)  HR: 88 (04 Sep 2019 05:23) (84 - 88)  BP: 124/54 (04 Sep 2019 05:23) (117/57 - 125/58)  SpO2: --  MEDICATIONS  (STANDING):  aspirin enteric coated 81 milliGRAM(s) Oral daily  atorvastatin 40 milliGRAM(s) Oral at bedtime  chlorhexidine 4% Liquid 1 Application(s) Topical <User Schedule>  clopidogrel Tablet 75 milliGRAM(s) Oral daily  docusate sodium 100 milliGRAM(s) Oral three times a day  heparin  Injectable 5000 Unit(s) SubCutaneous every 8 hours  melatonin 3 milliGRAM(s) Oral at bedtime  pantoprazole    Tablet 40 milliGRAM(s) Oral before breakfast  senna 2 Tablet(s) Oral at bedtime    MEDICATIONS  (PRN):  acetaminophen   Tablet .. 650 milliGRAM(s) Oral every 6 hours PRN Temp greater or equal to 38C (100.4F), Mild Pain (1 - 3)  aluminum hydroxide/magnesium hydroxide/simethicone Suspension 30 milliLiter(s) Oral every 4 hours PRN Dyspepsia  benzocaine 20% Spray 1 Spray(s) Topical three times a day PRN throat pain  meclizine 25 milliGRAM(s) Oral every 8 hours PRN Dizziness  ondansetron Injectable 4 milliGRAM(s) IV Push every 6 hours PRN Nausea  oxyCODONE    5 mG/acetaminophen 325 mG 1 Tablet(s) Oral every 4 hours PRN Moderate Pain (4 - 6)  oxyCODONE    5 mG/acetaminophen 325 mG 2 Tablet(s) Oral every 6 hours PRN Severe Pain (7 - 10)  senna 2 Tablet(s) Oral daily PRN Constipation    Labs:                        10.0   8.71  )-----------( 248      ( 04 Sep 2019 05:36 )             30.4     04 Sep 2019 05:36    140    |  104    |  7      ----------------------------<  108    4.1     |  24     |  0.6      Ca    8.9        04 Sep 2019 05:36  Mg     1.9       04 Sep 2019 05:36    TPro  6.1    /  Alb  3.3    /  TBili  0.3    /  DBili  x      /  AST  38     /  ALT  27     /  AlkPhos  102    04 Sep 2019 05:36          A/P:  vertigo  new headache   s/p left CEA   epigastric pain     GI eval  Protonix IVP   neuro follow up, headache, vertigo   fall precautions   OOB to chair  incentive spirometry    pain control, avoid NSAIDs   DVT prophylaxis  Decubitus prevention- all measures as per RN protocol

## 2019-09-04 NOTE — DISCHARGE NOTE PROVIDER - CARE PROVIDERS DIRECT ADDRESSES
[email or web address]

## 2019-09-04 NOTE — PROGRESS NOTE ADULT - SUBJECTIVE AND OBJECTIVE BOX
SUBJECTIVE:       Today is hospital day 12d. No acute events overnight, focused on discharge. HA and epigastric pain resolved.    PAST MEDICAL & SURGICAL HISTORY  Cataract  CAD (coronary artery disease)  HLD (hyperlipidemia)  HTN (hypertension)  H/O ovarian cystectomy  H/O hernia repair  History of tonsillectomy  History of cholecystectomy  History of surgery: cardiac stents x2    SOCIAL HISTORY:  Negative for smoking/alcohol/drug use.     ALLERGIES:  No Known Allergies    MEDICATIONS:  STANDING MEDICATIONS  aspirin enteric coated 81 milliGRAM(s) Oral daily  atorvastatin 40 milliGRAM(s) Oral at bedtime  chlorhexidine 4% Liquid 1 Application(s) Topical <User Schedule>  clopidogrel Tablet 75 milliGRAM(s) Oral daily  docusate sodium 100 milliGRAM(s) Oral three times a day  heparin  Injectable 5000 Unit(s) SubCutaneous every 8 hours  melatonin 3 milliGRAM(s) Oral at bedtime  pantoprazole    Tablet 40 milliGRAM(s) Oral before breakfast  senna 2 Tablet(s) Oral at bedtime    PRN MEDICATIONS  acetaminophen   Tablet .. 650 milliGRAM(s) Oral every 6 hours PRN  aluminum hydroxide/magnesium hydroxide/simethicone Suspension 30 milliLiter(s) Oral every 4 hours PRN  benzocaine 20% Spray 1 Spray(s) Topical three times a day PRN  meclizine 25 milliGRAM(s) Oral every 8 hours PRN  ondansetron Injectable 4 milliGRAM(s) IV Push every 6 hours PRN  oxyCODONE    5 mG/acetaminophen 325 mG 1 Tablet(s) Oral every 4 hours PRN  oxyCODONE    5 mG/acetaminophen 325 mG 2 Tablet(s) Oral every 6 hours PRN  senna 2 Tablet(s) Oral daily PRN    VITALS:   T(F): 97.2  HR: 85  BP: 120/63  RR: 18  SpO2: --    LABS:                        10.0   8.71  )-----------( 248      ( 04 Sep 2019 05:36 )             30.4     09-04    140  |  104  |  7<L>  ----------------------------<  108<H>  4.1   |  24  |  0.6<L>    Ca    8.9      04 Sep 2019 05:36  Mg     1.9     09-04    TPro  6.1  /  Alb  3.3<L>  /  TBili  0.3  /  DBili  x   /  AST  38  /  ALT  27  /  AlkPhos  102  09-04      < from: CT Head No Cont (08.22.19 @ 21:39) >      No CT evidence of acute intracranial pathology.     Chronic microvascular ischemic changes.      < end of copied text >    < from: MR Head No Cont (08.23.19 @ 16:39) >    1.  Periventricular and subcortical white matter chronic small vessel   ischemic changes and multiple old lacunar infarcts as described above.    2.  No acute infarcts or intracranial hemorrhage.          < end of copied text >      < from: MR Angio Neck w/ IV Cont (08.23.19 @ 16:40) >  1.  Short 0.5 cm segment of moderate (60-69%) stenosis of the proximal   left internal carotid artery.    2.  Calcified atheromatous plaque proximal right ICA with no significant   stenosis.    3.  Dominant left vertebral artery.        < end of copied text >      PHYSICAL EXAM:  GENERAL: NAD, lying in bed comfortably  HEAD:  Atraumatic, Normocephalic  ENT: Moist mucous membranes  CHEST/LUNG: Clear to auscultation bilaterally; No rales, rhonchi, wheezing, or rubs. Unlabored respirations  HEART: Regular rate and rhythm; No murmurs, rubs, or gallops  ABDOMEN: Bowel sounds present. Mild epigastric tenderness to palpation  EXTREMITIES:  2+ Peripheral Pulses, brisk capillary refill. No clubbing, cyanosis, or edema  NERVOUS SYSTEM:  Alert & Oriented X3, speech clear. No deficits   SKIN: No rashes or lesion

## 2019-09-04 NOTE — PROGRESS NOTE ADULT - ASSESSMENT
A 78 years old right handed  woman presents with dizziness and headache w/ evidence of chronic lacunar infarcts with LICA stenosis likely secondary to vascular risk factors/smoking currently s/p L CEA doing well.  Nonfocal.  positional vertigo likely BPPV currently resolved.       Plan:  - continue secondary stroke prevention with Plavix 75/Lipitor 40  - meclizine 25 mg TID PRN  - no further inpt neurologic w/u  - f/u w/ vascular as outpt for CEA post-op care  - f/u with either Dr. Andrade or myself in 4 weeks

## 2019-09-04 NOTE — DISCHARGE NOTE PROVIDER - CARE PROVIDER_API CALL
Lucy Gay)  Internal Medicine  [address] [place] [address]  Phone: [phone]  Fax: [phone]  Follow Up Time: 1-3 days Lucy Gay)  Internal Medicine  [address] [place] [address]  Phone: [phone]  Fax: [phone]  Follow Up Time: 1-3 days    Erasmo Olivarez)  Surgery; Vascular Surgery  [address] [place] [address], [address]  [place] [address]  Phone: [phone]  Fax: [phone]  Follow Up Time: 1-3 days Lucy Gay)  Internal Medicine  [address] [place] [address]  Phone: [phone]  Fax: [phone]  Follow Up Time: 1-3 days    Erasmo Olivarez)  Surgery; Vascular Surgery  [address], [address]  [place] [address]  Phone: [phone]  Fax: [phone]  Follow Up Time: 1-3 days    Philip Lara)  Internal Medicine  [address] [place] [address]  Phone: [phone]  Fax: [phone]  Follow Up Time: 1-3 days    Montana Sánchez)  Neuromuscular Medicine  [address], [address]  [place], [place] [id number]  Phone: [phone]  Fax: [phone]  Follow Up Time: 1-3 days

## 2019-09-04 NOTE — DISCHARGE NOTE PROVIDER - NSDCFUADDINST_GEN_ALL_CORE_FT
Please follow up with Dr. Gay in 1-3 days of discharge to follow up low TSH. Please follow up with Primary Provider in 1-3 days , as well the consultant to schedule follow up appt. Please schedule follow up with ENT clinic for vestibular rehab and possible EEG. [facility] Medicine clinic for Gastroenterology follow up.

## 2019-09-04 NOTE — PROGRESS NOTE ADULT - ASSESSMENT
78 years old female known to have HTN, DLD, CAD s/p 2 cardiac stents on Plavix and ASA, presents to ED for vertigo and ataxia x 1 day. As per pt, yesterday morning when she tried getting out of the bed in the morning, she suddenly started experiencing sensation of everything spinning around. Was seen by neurology and admitted for vertigo and ataxia. MR Neck showed LICA moderate stenosis. Patient now status post CEA.    # Vertigo, ataxia    - MRA neck significant for moderate stenosis (60-69%) of proximal LICA + calcified atheromatous plaque in HANANE w/o significant stenosis.  - Carotid duplex showed >80% stenosis in LICA and 20-39% stenosis in HANANE  - MRI brain: no acute infarcts or intracranial hemorrhage, chronic lacunar infarcts and chronic small vessel disease  - Vitamin B12/folate wnl, ECHO unremarkable EF 60-65%  - No evidence of CVA and Vit B12 def  - S/P CEA, hemodynamically stable, incentive spirometry encouraged  - continue secondary stroke prevention with Plavix 75/Lipitor 40  - meclizine 25 mg TID PRN for home  - pt would likely need outpt ENT eval and vestibular rehab or REEG to rule out seizure  - f/u w/ vascular as outpt for CEA post-op care  - f/u with either Dr. Andrade or myself in 4 weeks     # Low TSH- Hypothyroidism  - TSH low (0.01), T4 normal (6.5).   - follow up Outpatient Endo    # Headache  - band- like in quality,  3/10, waxes and wanes. Improved today  - likely tension HA  - Neurology f/u as per attending  - Avoid NSAIDs given epigastric sx's    # Epigastric pain - ? GERD  - On Pantoprazole 40 mg, received single doses of Pepcid IV  - MAALOX helps with sx's  - Please hold Ibuprofen for HA  - GI follow up as per attending    # HTN  - c/w lisinopril 20mg QD  - c/w metoprolol 25mg BID    # CAD s/p 2 stents  - c/w Aspirin 162mg QD - can hold before procedure   - c/w Metoprolol 25mg BID  - c/w Plavix 75mg QD - can hold before procedure   - c/w Atorvastatin 40mg QD     # DLD  - c/w Atorvastatin 40mg QD    DVT PPx: Lovenox  GI PPx: not indicated  Diet: dash  Activity: fall precautions   Dispo: Physiatry recommends - Home with Outpatient or VN services DC HOME- REFUSES VN SERVICES- SOC SERVICE TO SE

## 2019-09-04 NOTE — DISCHARGE NOTE PROVIDER - NSFOLLOWUPCLINICS_GEN_ALL_ED_FT
[facility]  ENT  [address], [address]nd [place] [address]  Phone: [phone]  Fax:   Follow Up Time: 1-3 Days [facility]  ENT  [address], [address]nd [place] [address]  Phone: [phone]  Fax:   Follow Up Time: 1-3 Days [facility]  ENT  [address], [address]nd [place] [address]  Phone: [phone]  Fax:   Follow Up Time: 1-3 Days

## 2019-09-04 NOTE — DISCHARGE NOTE PROVIDER - HOSPITAL COURSE
78 years old female known to have HTN, DLD, CAD s/p 2 cardiac stents on Plavix and ASA, presents to ED for vertigo and ataxia x 1 day. As per pt, when she tried getting out of the bed in the morning, she suddenly started experiencing sensation of everything spinning around. Was seen by neurology and admitted for vertigo and ataxia. Carotid duplex showed >80% stenosis in LICA and 20-39% stenosis in HANANE. MRI brain: no acute infarcts or intracranial hemorrhage, chronic lacunar infarcts and chronic small vessel disease. Patient now status post CEA. Headache improved at this time. GERD improving on pantoprazole. Will arrange follow up for low TSH with Endocrinology. - No evidence of CVA and Vit B12 def, pt would likely need outpt ENT eval and vestibular rehab or REEG to rule out seizure. 78 years old female known to have HTN, DLD, CAD s/p 2 cardiac stents on Plavix and ASA, presents to ED for vertigo and ataxia x 1 day. As per pt, when she tried getting out of the bed in the morning, she suddenly started experiencing sensation of everything spinning around. Was seen by neurology and admitted for vertigo and ataxia. Carotid duplex showed >80% stenosis in LICA and 20-39% stenosis in HANANE. MRI brain: no acute infarcts or intracranial hemorrhage, chronic lacunar infarcts and chronic small vessel disease. Patient now status post CEA. Headache improved at this time. GERD improving on pantoprazole. Will arrange follow up for low TSH with Endocrinology. - No evidence of CVA and Vit B12 def, pt would likely need outpt ENT eval and vestibular rehab or REEG to rule out seizure. Follow up with Neurology and vascular surgery post op. 78 years old female known to have HTN, DLD, CAD s/p 2 cardiac stents on Plavix and ASA, presents to ED for vertigo and ataxia x 1 day. As per pt, when she tried getting out of the bed in the morning, she suddenly started experiencing sensation of everything spinning around. Was seen by neurology and admitted for vertigo and ataxia. Carotid duplex showed >80% stenosis in LICA and 20-39% stenosis in HANANE. MRI brain: no acute infarcts or intracranial hemorrhage, chronic lacunar infarcts and chronic small vessel disease. Patient now status post CEA. Headache improved at this time. GERD improving on pantoprazole. Will arrange follow up for low TSH with Endocrinology. - No evidence of CVA and Vit B12 def, pt would likely need outpt ENT eval and vestibular rehab or REEG to rule out seizure. Follow up with Neurology, GI ( [facility]) and vascular surgery as outpatient.

## 2019-09-04 NOTE — PROGRESS NOTE ADULT - SUBJECTIVE AND OBJECTIVE BOX
Neurology Progress Note    Interval History:  Pt s/p L CEA and doing well.  initially had holocephalic headaches immediately after CEA but resolved over the last 2 days.  Positional vertigo has also resolved while on meclizine.  Currently back to baseline with no complaints.      PAST MEDICAL & SURGICAL HISTORY:  Cataract  CAD (coronary artery disease)  HLD (hyperlipidemia)  HTN (hypertension)  H/O ovarian cystectomy  H/O hernia repair  History of tonsillectomy  History of cholecystectomy  History of surgery: cardiac stents x2    Medications:  acetaminophen   Tablet .. 650 milliGRAM(s) Oral every 6 hours PRN  aluminum hydroxide/magnesium hydroxide/simethicone Suspension 30 milliLiter(s) Oral every 4 hours PRN  aspirin enteric coated 81 milliGRAM(s) Oral daily  atorvastatin 40 milliGRAM(s) Oral at bedtime  benzocaine 20% Spray 1 Spray(s) Topical three times a day PRN  chlorhexidine 4% Liquid 1 Application(s) Topical <User Schedule>  clopidogrel Tablet 75 milliGRAM(s) Oral daily  docusate sodium 100 milliGRAM(s) Oral three times a day  heparin  Injectable 5000 Unit(s) SubCutaneous every 8 hours  meclizine 25 milliGRAM(s) Oral every 8 hours PRN  melatonin 3 milliGRAM(s) Oral at bedtime  ondansetron Injectable 4 milliGRAM(s) IV Push every 6 hours PRN  oxyCODONE    5 mG/acetaminophen 325 mG 1 Tablet(s) Oral every 4 hours PRN  oxyCODONE    5 mG/acetaminophen 325 mG 2 Tablet(s) Oral every 6 hours PRN  pantoprazole    Tablet 40 milliGRAM(s) Oral before breakfast  senna 2 Tablet(s) Oral at bedtime  senna 2 Tablet(s) Oral daily PRN    Vital Signs Last 24 Hrs  T(C): 36.2 (04 Sep 2019 13:00), Max: 37.1 (03 Sep 2019 20:36)  T(F): 97.2 (04 Sep 2019 13:00), Max: 98.7 (03 Sep 2019 20:36)  HR: 85 (04 Sep 2019 13:00) (84 - 88)  BP: 120/63 (04 Sep 2019 13:00) (117/57 - 124/54)  BP(mean): --  RR: 18 (04 Sep 2019 13:00) (18 - 18)  SpO2: --    Neurological Exam:   Mental status: Awake, alert and oriented x3.  Recent and remote memory intact.  Naming, repetition and comprehension intact.  Attention/concentration intact.  No dysarthria, no aphasia.  Fund of knowledge appropriate.    Cranial nerves: Pupils equally round and reactive to light, visual fields full, no nystagmus, extraocular muscles intact, V1 through V3 intact bilaterally and symmetric, face symmetric, hearing intact to finger rub, palate elevation symmetric, tongue was midline.  Motor:  MRC grading 5/5 b/l UE/LE.   strength 5/5.  Normal tone and bulk.  No abnormal movements.    Sensation: Intact to light touch, proprioception, and pinprick.   Coordination: No dysmetria on finger-to-nose and heel-to-shin.  No dysdiadokinesia.  Reflexes: 2+ in bilateral UE/LE, downgoing toes bilaterally. (-) Fuentes.  Gait: Narrow and steady. No ataxia.  Romberg negative    Labs:  CBC Full  -  ( 04 Sep 2019 05:36 )  WBC Count : 8.71 K/uL  RBC Count : 3.27 M/uL  Hemoglobin : 10.0 g/dL  Hematocrit : 30.4 %  Platelet Count - Automated : 248 K/uL  Mean Cell Volume : 93.0 fL  Mean Cell Hemoglobin : 30.6 pg  Mean Cell Hemoglobin Concentration : 32.9 g/dL  Auto Neutrophil # : 5.36 K/uL  Auto Lymphocyte # : 1.91 K/uL  Auto Monocyte # : 1.19 K/uL  Auto Eosinophil # : 0.20 K/uL  Auto Basophil # : 0.02 K/uL  Auto Neutrophil % : 61.6 %  Auto Lymphocyte % : 21.9 %  Auto Monocyte % : 13.7 %  Auto Eosinophil % : 2.3 %  Auto Basophil % : 0.2 %    09-04    140  |  104  |  7<L>  ----------------------------<  108<H>  4.1   |  24  |  0.6<L>    Ca    8.9      04 Sep 2019 05:36  Mg     1.9     09-04    TPro  6.1  /  Alb  3.3<L>  /  TBili  0.3  /  DBili  x   /  AST  38  /  ALT  27  /  AlkPhos  102  09-04    LIVER FUNCTIONS - ( 04 Sep 2019 05:36 )  Alb: 3.3 g/dL / Pro: 6.1 g/dL / ALK PHOS: 102 U/L / ALT: 27 U/L / AST: 38 U/L / GGT: x

## 2019-09-04 NOTE — DISCHARGE NOTE PROVIDER - NSDCFUADDAPPT_GEN_ALL_CORE_FT
Please follow up with Dr. Gay in 1-3 days of discharge to follow up low TSH. Please follow up with Primary Provider in 1-3 days Please follow up with Dr. Gay in 1-3 days of discharge to follow up low TSH. Please follow up with Primary Provider in 1-3 days, as well the consultant to schedule follow up appt. Please schedule follow up with ENT clinic for vestibular rehab and possible EEG. Please follow up with Dr. Gay in 1-3 days of discharge to follow up low TSH. Please follow up with Primary Provider in 1-3 days , as well the consultant to schedule follow up appt. Please schedule follow up with ENT clinic for vestibular rehab and possible EEG. [facility] Medicine clinic for Gastroenterology follow up.

## 2019-09-04 NOTE — DISCHARGE NOTE PROVIDER - NSDCCPCAREPLAN_GEN_ALL_CORE_FT
PRINCIPAL DISCHARGE DIAGNOSIS  Diagnosis: Ataxia  Assessment and Plan of Treatment: S/P CEA  Seen by Neurology      SECONDARY DISCHARGE DIAGNOSES  Diagnosis: Low TSH level  Assessment and Plan of Treatment: Please follow up with outpatient providers as indicated PRINCIPAL DISCHARGE DIAGNOSIS  Diagnosis: Ataxia  Assessment and Plan of Treatment: S/P CEA  Seen by Neurology please follow up as outpatient      SECONDARY DISCHARGE DIAGNOSES  Diagnosis: Low TSH level  Assessment and Plan of Treatment: Please follow up with outpatient providers as indicated

## 2019-09-05 ENCOUNTER — TRANSCRIPTION ENCOUNTER (OUTPATIENT)
Age: 79
End: 2019-09-05

## 2019-09-05 ENCOUNTER — INBOUND DOCUMENT (OUTPATIENT)
Age: 79
End: 2019-09-05

## 2019-09-05 VITALS
HEART RATE: 86 BPM | DIASTOLIC BLOOD PRESSURE: 60 MMHG | SYSTOLIC BLOOD PRESSURE: 115 MMHG | RESPIRATION RATE: 18 BRPM | TEMPERATURE: 98 F

## 2019-09-05 LAB
ALBUMIN SERPL ELPH-MCNC: 3.5 G/DL — SIGNIFICANT CHANGE UP (ref 3.5–5.2)
ALP SERPL-CCNC: 104 U/L — SIGNIFICANT CHANGE UP (ref 30–115)
ALT FLD-CCNC: 31 U/L — SIGNIFICANT CHANGE UP (ref 0–41)
ANION GAP SERPL CALC-SCNC: 12 MMOL/L — SIGNIFICANT CHANGE UP (ref 7–14)
AST SERPL-CCNC: 40 U/L — SIGNIFICANT CHANGE UP (ref 0–41)
BASOPHILS # BLD AUTO: 0.02 K/UL — SIGNIFICANT CHANGE UP (ref 0–0.2)
BASOPHILS NFR BLD AUTO: 0.2 % — SIGNIFICANT CHANGE UP (ref 0–1)
BILIRUB SERPL-MCNC: 0.3 MG/DL — SIGNIFICANT CHANGE UP (ref 0.2–1.2)
BUN SERPL-MCNC: 9 MG/DL — LOW (ref 10–20)
CALCIUM SERPL-MCNC: 9.1 MG/DL — SIGNIFICANT CHANGE UP (ref 8.5–10.1)
CHLORIDE SERPL-SCNC: 101 MMOL/L — SIGNIFICANT CHANGE UP (ref 98–110)
CO2 SERPL-SCNC: 26 MMOL/L — SIGNIFICANT CHANGE UP (ref 17–32)
CREAT SERPL-MCNC: 0.6 MG/DL — LOW (ref 0.7–1.5)
EOSINOPHIL # BLD AUTO: 0.2 K/UL — SIGNIFICANT CHANGE UP (ref 0–0.7)
EOSINOPHIL NFR BLD AUTO: 2.4 % — SIGNIFICANT CHANGE UP (ref 0–8)
GLUCOSE SERPL-MCNC: 113 MG/DL — HIGH (ref 70–99)
HCT VFR BLD CALC: 30.6 % — LOW (ref 37–47)
HGB BLD-MCNC: 10.2 G/DL — LOW (ref 12–16)
IMM GRANULOCYTES NFR BLD AUTO: 0.5 % — HIGH (ref 0.1–0.3)
LYMPHOCYTES # BLD AUTO: 2.03 K/UL — SIGNIFICANT CHANGE UP (ref 1.2–3.4)
LYMPHOCYTES # BLD AUTO: 24.8 % — SIGNIFICANT CHANGE UP (ref 20.5–51.1)
MAGNESIUM SERPL-MCNC: 2 MG/DL — SIGNIFICANT CHANGE UP (ref 1.8–2.4)
MCHC RBC-ENTMCNC: 30.7 PG — SIGNIFICANT CHANGE UP (ref 27–31)
MCHC RBC-ENTMCNC: 33.3 G/DL — SIGNIFICANT CHANGE UP (ref 32–37)
MCV RBC AUTO: 92.2 FL — SIGNIFICANT CHANGE UP (ref 81–99)
MONOCYTES # BLD AUTO: 1.05 K/UL — HIGH (ref 0.1–0.6)
MONOCYTES NFR BLD AUTO: 12.9 % — HIGH (ref 1.7–9.3)
NEUTROPHILS # BLD AUTO: 4.83 K/UL — SIGNIFICANT CHANGE UP (ref 1.4–6.5)
NEUTROPHILS NFR BLD AUTO: 59.2 % — SIGNIFICANT CHANGE UP (ref 42.2–75.2)
NRBC # BLD: 0 /100 WBCS — SIGNIFICANT CHANGE UP (ref 0–0)
PLATELET # BLD AUTO: 262 K/UL — SIGNIFICANT CHANGE UP (ref 130–400)
POTASSIUM SERPL-MCNC: 4.2 MMOL/L — SIGNIFICANT CHANGE UP (ref 3.5–5)
POTASSIUM SERPL-SCNC: 4.2 MMOL/L — SIGNIFICANT CHANGE UP (ref 3.5–5)
PROT SERPL-MCNC: 6.3 G/DL — SIGNIFICANT CHANGE UP (ref 6–8)
RBC # BLD: 3.32 M/UL — LOW (ref 4.2–5.4)
RBC # FLD: 13.2 % — SIGNIFICANT CHANGE UP (ref 11.5–14.5)
SODIUM SERPL-SCNC: 139 MMOL/L — SIGNIFICANT CHANGE UP (ref 135–146)
WBC # BLD: 8.17 K/UL — SIGNIFICANT CHANGE UP (ref 4.8–10.8)
WBC # FLD AUTO: 8.17 K/UL — SIGNIFICANT CHANGE UP (ref 4.8–10.8)

## 2019-09-05 RX ORDER — ATORVASTATIN CALCIUM 80 MG/1
1 TABLET, FILM COATED ORAL
Qty: 30 | Refills: 0
Start: 2019-09-05 | End: 2019-10-04

## 2019-09-05 RX ORDER — ATORVASTATIN CALCIUM 80 MG/1
0 TABLET, FILM COATED ORAL
Qty: 0 | Refills: 0 | DISCHARGE

## 2019-09-05 RX ORDER — METOPROLOL TARTRATE 50 MG
0 TABLET ORAL
Qty: 0 | Refills: 0 | DISCHARGE

## 2019-09-05 RX ORDER — MECLIZINE HCL 12.5 MG
1 TABLET ORAL
Qty: 63 | Refills: 0
Start: 2019-09-05 | End: 2019-09-25

## 2019-09-05 RX ADMIN — PANTOPRAZOLE SODIUM 40 MILLIGRAM(S): 20 TABLET, DELAYED RELEASE ORAL at 06:02

## 2019-09-05 RX ADMIN — CLOPIDOGREL BISULFATE 75 MILLIGRAM(S): 75 TABLET, FILM COATED ORAL at 11:51

## 2019-09-05 RX ADMIN — Medication 100 MILLIGRAM(S): at 06:02

## 2019-09-05 RX ADMIN — HEPARIN SODIUM 5000 UNIT(S): 5000 INJECTION INTRAVENOUS; SUBCUTANEOUS at 06:02

## 2019-09-05 RX ADMIN — Medication 81 MILLIGRAM(S): at 11:51

## 2019-09-05 NOTE — PROGRESS NOTE ADULT - SUBJECTIVE AND OBJECTIVE BOX
Patient was seen and examined. Spoke with RN. Chart reviewed.  No events overnight.  Vital Signs Last 24 Hrs  T(F): 98 (05 Sep 2019 05:27), Max: 99.5 (04 Sep 2019 20:51)  HR: 84 (05 Sep 2019 05:27) (84 - 95)  BP: 137/61 (05 Sep 2019 05:27) (115/63 - 137/61)  SpO2: --  MEDICATIONS  (STANDING):  aspirin enteric coated 81 milliGRAM(s) Oral daily  atorvastatin 40 milliGRAM(s) Oral at bedtime  chlorhexidine 4% Liquid 1 Application(s) Topical <User Schedule>  clopidogrel Tablet 75 milliGRAM(s) Oral daily  docusate sodium 100 milliGRAM(s) Oral three times a day  heparin  Injectable 5000 Unit(s) SubCutaneous every 8 hours  melatonin 3 milliGRAM(s) Oral at bedtime  pantoprazole    Tablet 40 milliGRAM(s) Oral before breakfast  senna 2 Tablet(s) Oral at bedtime    MEDICATIONS  (PRN):  acetaminophen   Tablet .. 650 milliGRAM(s) Oral every 6 hours PRN Temp greater or equal to 38C (100.4F), Mild Pain (1 - 3)  aluminum hydroxide/magnesium hydroxide/simethicone Suspension 30 milliLiter(s) Oral every 4 hours PRN Dyspepsia  benzocaine 20% Spray 1 Spray(s) Topical three times a day PRN throat pain  meclizine 25 milliGRAM(s) Oral every 8 hours PRN Dizziness  ondansetron Injectable 4 milliGRAM(s) IV Push every 6 hours PRN Nausea  oxyCODONE    5 mG/acetaminophen 325 mG 1 Tablet(s) Oral every 4 hours PRN Moderate Pain (4 - 6)  oxyCODONE    5 mG/acetaminophen 325 mG 2 Tablet(s) Oral every 6 hours PRN Severe Pain (7 - 10)  senna 2 Tablet(s) Oral daily PRN Constipation    Labs:                        10.2   8.17  )-----------( 262      ( 05 Sep 2019 05:54 )             30.6                         10.0   8.71  )-----------( 248      ( 04 Sep 2019 05:36 )             30.4     05 Sep 2019 05:54    139    |  101    |  9      ----------------------------<  113    4.2     |  26     |  0.6    04 Sep 2019 05:36    140    |  104    |  7      ----------------------------<  108    4.1     |  24     |  0.6      Ca    9.1        05 Sep 2019 05:54  Ca    8.9        04 Sep 2019 05:36  Mg     2.0       05 Sep 2019 05:54  Mg     1.9       04 Sep 2019 05:36    TPro  6.3    /  Alb  3.5    /  TBili  0.3    /  DBili  x      /  AST  40     /  ALT  31     /  AlkPhos  104    05 Sep 2019 05:54  TPro  6.1    /  Alb  3.3    /  TBili  0.3    /  DBili  x      /  AST  38     /  ALT  27     /  AlkPhos  102    04 Sep 2019 05:36          General: comfortable, NAD  Neurology: A&Ox3, nonfocal  Head:  Normocephalic, atraumatic  ENT:  Mucosa moist, no ulcerations  Neck:  Supple, no JVD,   Skin: no breakdowns (as per RN)  Resp: CTA B/L  CV: RRR, S1S2,   GI: Soft, NT, bowel sounds  MS: No edema, + peripheral pulses, FROM all 4 extremity      A/P:    DVT prophylaxis  Decubitus prevention- all measures as per RN protocol  Please call or text me with any questions or updates

## 2019-09-05 NOTE — DISCHARGE NOTE NURSING/CASE MANAGEMENT/SOCIAL WORK - PATIENT PORTAL LINK FT
You can access the FollowMyHealth Patient Portal offered by [facility] by registering at the following website: [email or web address]. By joining [facility]’s FollowMyHealth portal, you will also be able to view your health information using other applications (apps) compatible with our system.

## 2019-09-05 NOTE — PROGRESS NOTE ADULT - REASON FOR ADMISSION
vertigo, ataxia
vertigo, ataxia  pre op clearance
vertigo, ataxia

## 2019-09-05 NOTE — DISCHARGE NOTE NURSING/CASE MANAGEMENT/SOCIAL WORK - NSDCFUADDAPPT_GEN_ALL_CORE_FT
Please follow up with Dr. Gay in 1-3 days of discharge to follow up low TSH. Please follow up with Primary Provider in 1-3 days , as well the consultant to schedule follow up appt. Please schedule follow up with ENT clinic for vestibular rehab and possible EEG. [facility] Medicine clinic for Gastroenterology follow up.

## 2019-09-05 NOTE — PROGRESS NOTE ADULT - PROVIDER SPECIALTY LIST ADULT
Cardiology
Internal Medicine
Neurology
SICU
Vascular Surgery
Internal Medicine
Internal Medicine

## 2019-09-12 DIAGNOSIS — R27.0 ATAXIA, UNSPECIFIED: ICD-10-CM

## 2019-09-12 DIAGNOSIS — E03.9 HYPOTHYROIDISM, UNSPECIFIED: ICD-10-CM

## 2019-09-12 DIAGNOSIS — R42 DIZZINESS AND GIDDINESS: ICD-10-CM

## 2019-09-12 DIAGNOSIS — Z95.5 PRESENCE OF CORONARY ANGIOPLASTY IMPLANT AND GRAFT: ICD-10-CM

## 2019-09-12 DIAGNOSIS — G44.209 TENSION-TYPE HEADACHE, UNSPECIFIED, NOT INTRACTABLE: ICD-10-CM

## 2019-09-12 DIAGNOSIS — I25.10 ATHEROSCLEROTIC HEART DISEASE OF NATIVE CORONARY ARTERY WITHOUT ANGINA PECTORIS: ICD-10-CM

## 2019-09-12 DIAGNOSIS — F17.210 NICOTINE DEPENDENCE, CIGARETTES, UNCOMPLICATED: ICD-10-CM

## 2019-09-12 DIAGNOSIS — K21.9 GASTRO-ESOPHAGEAL REFLUX DISEASE WITHOUT ESOPHAGITIS: ICD-10-CM

## 2019-09-12 DIAGNOSIS — I65.22 OCCLUSION AND STENOSIS OF LEFT CAROTID ARTERY: ICD-10-CM

## 2019-09-12 DIAGNOSIS — H26.9 UNSPECIFIED CATARACT: ICD-10-CM

## 2019-09-12 DIAGNOSIS — H81.10 BENIGN PAROXYSMAL VERTIGO, UNSPECIFIED EAR: ICD-10-CM

## 2019-09-12 DIAGNOSIS — E78.5 HYPERLIPIDEMIA, UNSPECIFIED: ICD-10-CM

## 2019-09-12 DIAGNOSIS — I10 ESSENTIAL (PRIMARY) HYPERTENSION: ICD-10-CM

## 2019-09-25 PROBLEM — H26.9 UNSPECIFIED CATARACT: Chronic | Status: ACTIVE | Noted: 2019-08-23

## 2019-09-25 PROBLEM — I10 ESSENTIAL (PRIMARY) HYPERTENSION: Chronic | Status: ACTIVE | Noted: 2019-08-22

## 2019-09-25 PROBLEM — E78.5 HYPERLIPIDEMIA, UNSPECIFIED: Chronic | Status: ACTIVE | Noted: 2019-08-22

## 2019-09-25 PROBLEM — I25.10 ATHEROSCLEROTIC HEART DISEASE OF NATIVE CORONARY ARTERY WITHOUT ANGINA PECTORIS: Chronic | Status: ACTIVE | Noted: 2019-08-22

## 2019-10-07 ENCOUNTER — APPOINTMENT (OUTPATIENT)
Dept: VASCULAR SURGERY | Facility: CLINIC | Age: 79
End: 2019-10-07
Payer: MEDICARE

## 2019-10-07 VITALS
HEIGHT: 63 IN | BODY MASS INDEX: 32.78 KG/M2 | SYSTOLIC BLOOD PRESSURE: 138 MMHG | DIASTOLIC BLOOD PRESSURE: 80 MMHG | WEIGHT: 185 LBS

## 2019-10-07 DIAGNOSIS — I65.29 OCCLUSION AND STENOSIS OF UNSPECIFIED CAROTID ARTERY: ICD-10-CM

## 2019-10-07 PROCEDURE — 99024 POSTOP FOLLOW-UP VISIT: CPT

## 2019-10-07 PROCEDURE — 93880 EXTRACRANIAL BILAT STUDY: CPT

## 2019-10-07 NOTE — DATA REVIEWED
[FreeTextEntry1] : 10/7/2019: carotid duplex right < 50 % stenosis. Left CEA site patent with some Flow disturbance at the endarterectomy site .

## 2019-10-07 NOTE — HISTORY OF PRESENT ILLNESS
[FreeTextEntry1] : The patient presented to [facility] in August 2019 with complaints of ataxia and vertigo. The patient underwent a full workup and was noted to have a high-grade stenosis of the left internal carotid artery. I performed a left carotid endarterectomy on this patient on 8/29/19. She has recovered well and appears very well in office today. No neurological symptoms. Walking well. incision healing well. \par \par Still smoking. On aspirin/plavix and statin

## 2019-10-07 NOTE — ASSESSMENT
[FreeTextEntry1] : The patient is sp left CEA 08/29/19 for high grade stenosis. Incision is healing well and she has no complaints today. I preformed a carotid duplex that showed some flow disturbance at common carotid but the endarterectomy site is otherwise patent. I recommend she remain on the ASA, plavix and Atorvastatin and I will see her back in my office in 3 months time for repeat carotid duplex. Advised strongly to stop smoking.

## 2019-10-21 NOTE — CDI QUERY NOTE - NSCDIOTHERTXTBX_GEN_ALL_CORE_HH
Clinical documentation indicates that this patient has Lt carotid endarterectomy for carotid stenosis with dizziness and possible TIA.  documented by vascular team on 8/30/19:  Acute anemia- stable @ 11/33, continue to trend, monitor for signs of bleeding.  Lab shows H/H was 12.4 / 36.6 on admission and this went down to 9.9 / 29.6 on 9/2/19                                                        In order to accurately capture the diagnosis to the greatest degree of specificity. can you please specify the etiology of the acute anemia    • Acute blood loss anemia	  • Acute hemolytic anemia  • Acute bone marrow suppression.  • Other (please specify)  • Unable  to determine    Present on Admission:  Was the severity of the condition present on admission?  If so, please document in the chart that “(the condition) was present on admission.”    In responding to this request, please exercise your independent professional judgment.  The fact that a question is asked does not imply that any particular answer is desired or expected.    Documentation clarification is required for compliance, accuracy in coding and billing, and reporting severity of illness, quality data   and risk of mortality.

## 2021-06-08 NOTE — OCCUPATIONAL THERAPY INITIAL EVALUATION ADULT - SPECIFY REASON(S)
Pulmonary Function Test  Performed by: Kailey Koenig, RRT  Authorized by: New Omalley MD      Pre Drug % Predicted    FVC: 71%   FEV1: 60%   FEF 25-75%: 32%   FEV1/FVC: 65.82%   T%   RV: 140%   DLCO: 36%   D/VAsb: 44%    Interpretation   Spirometry   Spirometry shows moderate obstruction. There is reduced midflow suggesting small airway/airflow obstruction.   Review of FVL curve   Patient's effort is normal.   Lung Volume Measurements  Measurements show elevated residual volume consistent with gas trapping.   Diffusion Capacity  The patient's diffusion capacity is severely reduced.  Diffusion capacity is moderately reduced when corrected for alveolar volume.        Attempted to see pt for OT IE, however pt currently at a test. Will follow up.

## 2021-06-19 ENCOUNTER — EMERGENCY (EMERGENCY)
Facility: HOSPITAL | Age: 81
LOS: 0 days | Discharge: HOME | End: 2021-06-19
Attending: EMERGENCY MEDICINE | Admitting: EMERGENCY MEDICINE
Payer: MEDICARE

## 2021-06-19 VITALS
TEMPERATURE: 98 F | HEART RATE: 79 BPM | HEIGHT: 63 IN | SYSTOLIC BLOOD PRESSURE: 183 MMHG | DIASTOLIC BLOOD PRESSURE: 87 MMHG | OXYGEN SATURATION: 100 % | RESPIRATION RATE: 18 BRPM | WEIGHT: 192.02 LBS

## 2021-06-19 DIAGNOSIS — R07.81 PLEURODYNIA: ICD-10-CM

## 2021-06-19 DIAGNOSIS — Z90.89 ACQUIRED ABSENCE OF OTHER ORGANS: Chronic | ICD-10-CM

## 2021-06-19 DIAGNOSIS — Z98.890 OTHER SPECIFIED POSTPROCEDURAL STATES: Chronic | ICD-10-CM

## 2021-06-19 DIAGNOSIS — R39.15 URGENCY OF URINATION: ICD-10-CM

## 2021-06-19 DIAGNOSIS — F17.200 NICOTINE DEPENDENCE, UNSPECIFIED, UNCOMPLICATED: ICD-10-CM

## 2021-06-19 DIAGNOSIS — R10.9 UNSPECIFIED ABDOMINAL PAIN: ICD-10-CM

## 2021-06-19 DIAGNOSIS — B02.9 ZOSTER WITHOUT COMPLICATIONS: ICD-10-CM

## 2021-06-19 DIAGNOSIS — E78.5 HYPERLIPIDEMIA, UNSPECIFIED: ICD-10-CM

## 2021-06-19 DIAGNOSIS — Z79.899 OTHER LONG TERM (CURRENT) DRUG THERAPY: ICD-10-CM

## 2021-06-19 DIAGNOSIS — Z79.82 LONG TERM (CURRENT) USE OF ASPIRIN: ICD-10-CM

## 2021-06-19 DIAGNOSIS — Z95.5 PRESENCE OF CORONARY ANGIOPLASTY IMPLANT AND GRAFT: ICD-10-CM

## 2021-06-19 DIAGNOSIS — E78.00 PURE HYPERCHOLESTEROLEMIA, UNSPECIFIED: ICD-10-CM

## 2021-06-19 DIAGNOSIS — Z90.49 ACQUIRED ABSENCE OF OTHER SPECIFIED PARTS OF DIGESTIVE TRACT: Chronic | ICD-10-CM

## 2021-06-19 DIAGNOSIS — N39.0 URINARY TRACT INFECTION, SITE NOT SPECIFIED: ICD-10-CM

## 2021-06-19 DIAGNOSIS — I10 ESSENTIAL (PRIMARY) HYPERTENSION: ICD-10-CM

## 2021-06-19 DIAGNOSIS — I25.10 ATHEROSCLEROTIC HEART DISEASE OF NATIVE CORONARY ARTERY WITHOUT ANGINA PECTORIS: ICD-10-CM

## 2021-06-19 LAB
APPEARANCE UR: CLEAR — SIGNIFICANT CHANGE UP
BACTERIA # UR AUTO: NEGATIVE — SIGNIFICANT CHANGE UP
BILIRUB UR-MCNC: NEGATIVE — SIGNIFICANT CHANGE UP
COLOR SPEC: COLORLESS — SIGNIFICANT CHANGE UP
DIFF PNL FLD: NEGATIVE — SIGNIFICANT CHANGE UP
EPI CELLS # UR: 1 /HPF — SIGNIFICANT CHANGE UP (ref 0–5)
GLUCOSE UR QL: NEGATIVE — SIGNIFICANT CHANGE UP
HYALINE CASTS # UR AUTO: 1 /LPF — SIGNIFICANT CHANGE UP (ref 0–7)
KETONES UR-MCNC: NEGATIVE — SIGNIFICANT CHANGE UP
LEUKOCYTE ESTERASE UR-ACNC: ABNORMAL
NITRITE UR-MCNC: NEGATIVE — SIGNIFICANT CHANGE UP
PH UR: 6 — SIGNIFICANT CHANGE UP (ref 5–8)
PROT UR-MCNC: NEGATIVE — SIGNIFICANT CHANGE UP
RBC CASTS # UR COMP ASSIST: 1 /HPF — SIGNIFICANT CHANGE UP (ref 0–4)
SP GR SPEC: 1 — LOW (ref 1.01–1.03)
UROBILINOGEN FLD QL: SIGNIFICANT CHANGE UP
WBC UR QL: 8 /HPF — HIGH (ref 0–5)

## 2021-06-19 PROCEDURE — 99284 EMERGENCY DEPT VISIT MOD MDM: CPT

## 2021-06-19 RX ORDER — VALACYCLOVIR 500 MG/1
1 TABLET, FILM COATED ORAL
Qty: 21 | Refills: 0
Start: 2021-06-19 | End: 2021-06-25

## 2021-06-19 RX ORDER — OXYCODONE AND ACETAMINOPHEN 5; 325 MG/1; MG/1
1 TABLET ORAL ONCE
Refills: 0 | Status: DISCONTINUED | OUTPATIENT
Start: 2021-06-19 | End: 2021-06-19

## 2021-06-19 RX ORDER — IBUPROFEN 200 MG
400 TABLET ORAL ONCE
Refills: 0 | Status: COMPLETED | OUTPATIENT
Start: 2021-06-19 | End: 2021-06-19

## 2021-06-19 RX ORDER — CLOPIDOGREL BISULFATE 75 MG/1
75 TABLET, FILM COATED ORAL ONCE
Refills: 0 | Status: COMPLETED | OUTPATIENT
Start: 2021-06-19 | End: 2021-06-19

## 2021-06-19 RX ORDER — LISINOPRIL 2.5 MG/1
20 TABLET ORAL ONCE
Refills: 0 | Status: COMPLETED | OUTPATIENT
Start: 2021-06-19 | End: 2021-06-19

## 2021-06-19 RX ORDER — METOPROLOL TARTRATE 50 MG
25 TABLET ORAL ONCE
Refills: 0 | Status: COMPLETED | OUTPATIENT
Start: 2021-06-19 | End: 2021-06-19

## 2021-06-19 RX ADMIN — CLOPIDOGREL BISULFATE 75 MILLIGRAM(S): 75 TABLET, FILM COATED ORAL at 08:59

## 2021-06-19 RX ADMIN — Medication 25 MILLIGRAM(S): at 08:59

## 2021-06-19 RX ADMIN — OXYCODONE AND ACETAMINOPHEN 1 TABLET(S): 5; 325 TABLET ORAL at 08:01

## 2021-06-19 RX ADMIN — LISINOPRIL 20 MILLIGRAM(S): 2.5 TABLET ORAL at 10:12

## 2021-06-19 NOTE — ED ADULT NURSE NOTE - OBJECTIVE STATEMENT
Pt presents to ED c/o left flank pain radiating to left back and left ribs x3-4 days. Pt reports pain is constant and worse in certain positions. Pt also reports urinary urgency x2 days. Pt has taken Cipro x2 days with no improvement. Denies fevers/abdominal pain/nausea/vomiting.

## 2021-06-19 NOTE — ED PROVIDER NOTE - CLINICAL SUMMARY MEDICAL DECISION MAKING FREE TEXT BOX
pt p/w shingles and urinary sx. UA+, cont cipro. start valtrex. f/u pmd 1 week, strict return precautions provided.

## 2021-06-19 NOTE — ED PROVIDER NOTE - ATTENDING CONTRIBUTION TO CARE
80F PMH CAD HTN HL, p/w 3 days L rib pain, sharp constant radiates to L flank. No fever, chills. No cp, sob. No nvdc. No dysuria, hematuria. Pt had urinary freq/urge last week, started on cipro over the phone by pmd, states hasn't had urinary sx in the last 2 days, symptoms resolved.     on exam, AFVSS, well patsy nad, ncat, eomi, perrla, mmm, lctab, rrr nl s1s2 no mrg, abd soft ntnd, vesicular rash to L ribs/L flank, aaox3, no focal deficits, no le edema or calf ttp,     a/p;  Shingles, pain control, acyclovir, f/u pmd 1-2 weeks. Will check UA/cx r/o UTI.

## 2021-06-19 NOTE — ED ADULT TRIAGE NOTE - CHIEF COMPLAINT QUOTE
Pt complaining of left sided rib pain that radiates to her left back. Pain has been going on since Wednesday.

## 2021-06-19 NOTE — ED PROVIDER NOTE - NSFOLLOWUPINSTRUCTIONS_ED_ALL_ED_FT
Shingles    Shingles, which is also known as herpes zoster, is an infection that causes a painful skin rash and fluid-filled blisters. Shingles is not related to genital herpes, which is a sexually transmitted infection.     Shingles only develops in people who:    Have had chickenpox.  Have received the chickenpox vaccine. (This is rare.)    CAUSES  Shingles is caused by varicella-zoster virus (VZV). This is the same virus that causes chickenpox. After exposure to VZV, the virus stays in the body in an inactive (dormant) state. Shingles develops if the virus reactivates. This can happen many years after the initial exposure to VZV. It is not known what causes this virus to reactivate.    RISK FACTORS  People who have had chickenpox or received the chickenpox vaccine are at risk for shingles. Infection is more common in people who:    Are older than age 50.  Have a weakened defense (immune) system, such as those with HIV, AIDS, or cancer.  Are taking medicines that weaken the immune system, such as transplant medicines.  Are under great stress.    SYMPTOMS  Early symptoms of this condition include itching, tingling, and pain in an area on your skin. Pain may be described as burning, stabbing, or throbbing.    A few days or weeks after symptoms start, a painful red rash appears, usually on one side of the body in a bandlike or beltlike pattern. The rash eventually turns into fluid-filled blisters that break open, scab over, and dry up in about 2–3 weeks.    At any time during the infection, you may also develop:    A fever.  Chills.  A headache.  An upset stomach.    DIAGNOSIS  This condition is diagnosed with a skin exam. Sometimes, skin or fluid samples are taken from the blisters before a diagnosis is made. These samples are examined under a microscope or sent to a lab for testing.    TREATMENT  There is no specific cure for this condition. Your health care provider will probably prescribe medicines to help you manage pain, recover more quickly, and avoid long-term problems. Medicines may include:    Antiviral drugs.  Anti-inflammatory drugs.  Pain medicines.    If the area involved is on your face, you may be referred to a specialist, such as an eye doctor (ophthalmologist) or an ear, nose, and throat (ENT) doctor to help you avoid eye problems, chronic pain, or disability.    HOME CARE INSTRUCTIONS  Medicines    Take medicines only as directed by your health care provider.  Apply an anti-itch or numbing cream to the affected area as directed by your health care provider.    Blister and Rash Care    Take a cool bath or apply cool compresses to the area of the rash or blisters as directed by your health care provider. This may help with pain and itching.  Keep your rash covered with a loose bandage (dressing). Wear loose-fitting clothing to help ease the pain of material rubbing against the rash.  Keep your rash and blisters clean with mild soap and cool water or as directed by your health care provider.  Check your rash every day for signs of infection. These include redness, swelling, and pain that lasts or increases.  Do not pick your blisters.  Do not scratch your rash.    General Instructions    Rest as directed by your health care provider.  Keep all follow-up visits as directed by your health care provider. This is important.  Until your blisters scab over, your infection can cause chickenpox in people who have never had it or been vaccinated against it. To prevent this from happening, avoid contact with other people, especially:  Babies.  Pregnant women.  Children who have eczema.  Elderly people who have transplants.  People who have chronic illnesses, such as leukemia or AIDS.    SEEK MEDICAL CARE IF:  Your pain is not relieved with prescribed medicines.  Your pain does not get better after the rash heals.  Your rash looks infected. Signs of infection include redness, swelling, and pain that lasts or increases.    SEEK IMMEDIATE MEDICAL CARE IF:  The rash is on your face or nose.  You have facial pain, pain around your eye area, or loss of feeling on one side of your face.  You have ear pain or you have ringing in your ear.  You have loss of taste.  Your condition gets worse.    ADDITIONAL NOTES AND INSTRUCTIONS    Please follow up with your Primary MD in 24-48 hr.  Seek immediate medical care for any new/worsening signs or symptoms.     Urinary Tract Infection    A urinary tract infection (UTI) is an infection of any part of the urinary tract, which includes the kidneys, ureters, bladder, and urethra. Risk factors include ignoring your need to urinate, wiping back to front if female, being an uncircumcised male, and having diabetes or a weak immune system. Symptoms include frequent urination, pain or burning with urination, foul smelling urine, cloudy urine, pain in the lower abdomen, blood in the urine, and fever. If you were prescribed an antibiotic medicine, take it as told by your health care provider. Do not stop taking the antibiotic even if you start to feel better.    SEEK IMMEDIATE MEDICAL CARE IF YOU HAVE THE FOLLOWING SYMPTOMS: severe back or abdominal pain, inability to keep fluids or medicine down, dizziness/lightheadedness, or a change in mental status.

## 2021-06-19 NOTE — ED PROVIDER NOTE - OBJECTIVE STATEMENT
81 yo F hx of CAD with stents, HTN, high cholesterol c/o left flank pain radiating to left back and left lower anterior ribs x 3-4 days. Pain is constant, sharp, with and worse with certain positions. + urinary urgency x2 days also. She spoke with PMD and was prescribed Cipro x 2 days without improvement. No fevers, SOB, n/v or abdominal pains. No hematuria. 79 yo F hx of CAD with stents, HTN, high cholesterol c/o left flank pain radiating to left back and left lower anterior ribs x 3-4 days. Pain is constant, sharp, with and worse with certain positions. + urinary urgency x2 days also which has since resolved. She spoke with PMD and was prescribed Cipro x 2 days without improvement. No fevers, SOB, n/v or abdominal pains. No hematuria.

## 2021-06-19 NOTE — ED PROVIDER NOTE - PATIENT PORTAL LINK FT
You can access the FollowMyHealth Patient Portal offered by [facility] by registering at the following website: [email or web address]. By joining [employer]’s FollowMyHealth portal, you will also be able to view your health information using other applications (apps) compatible with our system.

## 2021-06-19 NOTE — ED PROVIDER NOTE - PHYSICAL EXAMINATION
Gen: Alert, NAD, well appearing  Head: NC, AT, PERRL, EOMI, normal lids/conjunctiva  ENT: normal hearing  Neck: +supple, no tenderness/meningismus,  Pulm: Bilateral BS, normal resp effort, no wheeze/stridor/retractions  CV: RRR  Abd: soft, NT/ND  Mskel: no edema/erythema/cyanosis  Skin: + vesicular noted to left mid back, left flank and beneath left breast along dermatome consistent with shingles.  warm/dry  Neuro: AAOx3, no sensory/motor deficits

## 2021-06-19 NOTE — ED ADULT NURSE NOTE - NSIMPLEMENTINTERV_GEN_ALL_ED
Implemented All Universal Safety Interventions:  [place] to call system. Call bell, personal items and telephone within reach. Instruct patient to call for assistance. Room bathroom lighting operational. Non-slip footwear when patient is off stretcher. Physically safe environment: no spills, clutter or unnecessary equipment. Stretcher in lowest position, wheels locked, appropriate side rails in place.

## 2021-06-19 NOTE — ED PROVIDER NOTE - NS ED ROS FT
Review of Systems    Constitutional: (-) fever, (-) chills  Eyes/ENT: (-) blurry vision, (-) epistaxis, (-) sore throat  Cardiovascular: (-) chest pain, (-) syncope  Respiratory: (-) cough, (-) shortness of breath  Gastrointestinal: (+) left flank pain, (-) nausea, (-) vomiting, (-) diarrhea  : (+) urinary urgency  Musculoskeletal: (-) neck pain, (+) left back pain, (-) body aches, (+) left rib pain  Integumentary: (-) rash, (-) edema  Neurological: (-) headache, (-) altered mental status  Allergic/Immunologic: (-) pruritus

## 2021-06-20 LAB
CULTURE RESULTS: NO GROWTH — SIGNIFICANT CHANGE UP
SPECIMEN SOURCE: SIGNIFICANT CHANGE UP

## 2021-07-04 ENCOUNTER — EMERGENCY (EMERGENCY)
Facility: HOSPITAL | Age: 81
LOS: 0 days | Discharge: HOME | End: 2021-07-04
Attending: EMERGENCY MEDICINE | Admitting: EMERGENCY MEDICINE
Payer: MEDICARE

## 2021-07-04 VITALS
HEART RATE: 87 BPM | RESPIRATION RATE: 18 BRPM | OXYGEN SATURATION: 99 % | DIASTOLIC BLOOD PRESSURE: 67 MMHG | SYSTOLIC BLOOD PRESSURE: 133 MMHG | TEMPERATURE: 99 F | HEIGHT: 63 IN

## 2021-07-04 VITALS
HEART RATE: 82 BPM | RESPIRATION RATE: 18 BRPM | DIASTOLIC BLOOD PRESSURE: 75 MMHG | SYSTOLIC BLOOD PRESSURE: 156 MMHG | TEMPERATURE: 98 F | OXYGEN SATURATION: 98 %

## 2021-07-04 DIAGNOSIS — Z95.5 PRESENCE OF CORONARY ANGIOPLASTY IMPLANT AND GRAFT: ICD-10-CM

## 2021-07-04 DIAGNOSIS — Z98.890 OTHER SPECIFIED POSTPROCEDURAL STATES: Chronic | ICD-10-CM

## 2021-07-04 DIAGNOSIS — Z79.02 LONG TERM (CURRENT) USE OF ANTITHROMBOTICS/ANTIPLATELETS: ICD-10-CM

## 2021-07-04 DIAGNOSIS — Z90.49 ACQUIRED ABSENCE OF OTHER SPECIFIED PARTS OF DIGESTIVE TRACT: Chronic | ICD-10-CM

## 2021-07-04 DIAGNOSIS — E78.5 HYPERLIPIDEMIA, UNSPECIFIED: ICD-10-CM

## 2021-07-04 DIAGNOSIS — Z90.89 ACQUIRED ABSENCE OF OTHER ORGANS: Chronic | ICD-10-CM

## 2021-07-04 DIAGNOSIS — R10.9 UNSPECIFIED ABDOMINAL PAIN: ICD-10-CM

## 2021-07-04 DIAGNOSIS — Z79.899 OTHER LONG TERM (CURRENT) DRUG THERAPY: ICD-10-CM

## 2021-07-04 DIAGNOSIS — B02.9 ZOSTER WITHOUT COMPLICATIONS: ICD-10-CM

## 2021-07-04 DIAGNOSIS — Z79.82 LONG TERM (CURRENT) USE OF ASPIRIN: ICD-10-CM

## 2021-07-04 DIAGNOSIS — I25.10 ATHEROSCLEROTIC HEART DISEASE OF NATIVE CORONARY ARTERY WITHOUT ANGINA PECTORIS: ICD-10-CM

## 2021-07-04 DIAGNOSIS — I10 ESSENTIAL (PRIMARY) HYPERTENSION: ICD-10-CM

## 2021-07-04 DIAGNOSIS — H26.9 UNSPECIFIED CATARACT: ICD-10-CM

## 2021-07-04 PROCEDURE — 93010 ELECTROCARDIOGRAM REPORT: CPT

## 2021-07-04 PROCEDURE — 99284 EMERGENCY DEPT VISIT MOD MDM: CPT

## 2021-07-04 RX ORDER — OXYCODONE AND ACETAMINOPHEN 5; 325 MG/1; MG/1
1 TABLET ORAL ONCE
Refills: 0 | Status: DISCONTINUED | OUTPATIENT
Start: 2021-07-04 | End: 2021-07-04

## 2021-07-04 RX ORDER — IBUPROFEN 200 MG
1 TABLET ORAL
Qty: 21 | Refills: 0
Start: 2021-07-04 | End: 2021-07-10

## 2021-07-04 RX ORDER — GABAPENTIN 400 MG/1
1 CAPSULE ORAL
Qty: 14 | Refills: 0
Start: 2021-07-04 | End: 2021-07-10

## 2021-07-04 RX ORDER — LIDOCAINE 4 G/100G
1 CREAM TOPICAL
Qty: 1 | Refills: 0
Start: 2021-07-04 | End: 2021-07-10

## 2021-07-04 RX ADMIN — OXYCODONE AND ACETAMINOPHEN 1 TABLET(S): 5; 325 TABLET ORAL at 15:02

## 2021-07-04 NOTE — ED ADULT TRIAGE NOTE - CHIEF COMPLAINT QUOTE
pt c/o abdominal pain radiating to back pt c/o abdominal pain radiating to back, recent dx of shingles no blisters noted

## 2021-07-04 NOTE — ED PROVIDER NOTE - PATIENT PORTAL LINK FT
You can access the FollowMyHealth Patient Portal offered by [facility] by registering at the following website: [email or web address]. By joining [employer]’s FollowMyHealth portal, you will also be able to view your health information using other applications (apps) compatible with our system.

## 2021-07-04 NOTE — ED PROVIDER NOTE - NS ED ROS FT
Constitutional: no fever, chills, no recent weight loss, change in appetite or malaise  Eyes: no redness/discharge/pain/vision changes  ENT: no rhinorrhea/ear pain/sore throat  Cardiac: No chest pain, SOB or edema.  Respiratory: No cough or respiratory distress  GI: No nausea, vomiting, diarrhea or abdominal pain.  : No dysuria, frequency, urgency or hematuria  MS: no pain to back or extremities, no loss of ROM, no weakness  Neuro: No headache or weakness. No LOC.  Skin: + rash/pain to L flank.   Endocrine: No history of thyroid disease or diabetes.  Except as documented in the HPI, all other systems are negative.

## 2021-07-04 NOTE — ED PROVIDER NOTE - PROGRESS NOTE DETAILS
ATTENDING NOTE: I personally evaluated the patient. I reviewed the Physician Assistant’s note (as assigned above), and agree with the findings and plan except as documented in my note.  79 y/o F diagnosed on 6/19 with shingles to her L flank presents to ED for persistent post shingle pain. Pt was seen by her PMD and given Amitriptyline which has not helped. Pt also notes taking Motrin and Tylenol without relief, and using lidocaine patch with some relief but it’s very expensive so stopped using it. Pain described as burning. No other complaints.   Const: Well nourished, well developed, appears stated age. Eyes: PERRL, no conjunctival injection. HENT:  Neck supple without meningismus. CV: RRR, Warm, well-perfused extremities. RESP: CTA B/L, no tachypnea. GI: soft, non-tender, non-distended. MSK: No gross deformities appreciated. Skin: Warm, dry. (+) Mild vesicular rash from L flank across abdomen dermatan line. Neuro: Alert, CNs II-XII grossly intact. Sensation and motor function of extremities grossly intact. Psych: Appropriate mood and affect.  Plan for pain control and EKG.

## 2021-07-04 NOTE — ED PROVIDER NOTE - ATTENDING CONTRIBUTION TO CARE
ATTENDING NOTE: I personally evaluated the patient. I reviewed the Physician Assistant’s note (as assigned above), and agree with the findings and plan except as documented in my note.    81 y/o F diagnosed on 6/19 with shingles to her L flank presents to ED for persistent post shingle pain. Pt was seen by her PMD and given Amitriptyline which has not helped. Pt also notes taking Motrin and Tylenol without relief, and using lidocaine patch with some relief but it’s very expensive so stopped using it. Pain described as burning. No other complaints.     Const: Well nourished, well developed, appears stated age. Eyes: PERRL, no conjunctival injection. HENT:  Neck supple without meningismus. CV: RRR, Warm, well-perfused extremities. RESP: CTA B/L, no tachypnea. GI: soft, non-tender, non-distended. MSK: No gross deformities appreciated. Skin: Warm, dry. (+) Mild vesicular rash from L flank across abdomen dermatan line. Neuro: Alert, CNs II-XII grossly intact. Sensation and motor function of extremities grossly intact. Psych: Appropriate mood and affect.    Plan for pain control

## 2021-07-04 NOTE — ED PROVIDER NOTE - NSFOLLOWUPINSTRUCTIONS_ED_ALL_ED_FT
SHINGLES    Shingles, which is also known as herpes zoster, is an infection that causes a painful skin rash and fluid-filled blisters. Shingles is not related to genital herpes, which is a sexually transmitted infection.     Shingles only develops in people who:    Have had chickenpox.  Have received the chickenpox vaccine. (This is rare.)    CAUSES  Shingles is caused by varicella-zoster virus (VZV). This is the same virus that causes chickenpox. After exposure to VZV, the virus stays in the body in an inactive (dormant) state. Shingles develops if the virus reactivates. This can happen many years after the initial exposure to VZV. It is not known what causes this virus to reactivate.    RISK FACTORS  People who have had chickenpox or received the chickenpox vaccine are at risk for shingles. Infection is more common in people who:    Are older than age 50.  Have a weakened defense (immune) system, such as those with HIV, AIDS, or cancer.  Are taking medicines that weaken the immune system, such as transplant medicines.  Are under great stress.    SYMPTOMS  Early symptoms of this condition include itching, tingling, and pain in an area on your skin. Pain may be described as burning, stabbing, or throbbing.    A few days or weeks after symptoms start, a painful red rash appears, usually on one side of the body in a bandlike or beltlike pattern. The rash eventually turns into fluid-filled blisters that break open, scab over, and dry up in about 2–3 weeks.    At any time during the infection, you may also develop:    A fever.  Chills.  A headache.  An upset stomach.    DIAGNOSIS  This condition is diagnosed with a skin exam. Sometimes, skin or fluid samples are taken from the blisters before a diagnosis is made. These samples are examined under a microscope or sent to a lab for testing.    TREATMENT  There is no specific cure for this condition. Your health care provider will probably prescribe medicines to help you manage pain, recover more quickly, and avoid long-term problems. Medicines may include:    Antiviral drugs.  Anti-inflammatory drugs.  Pain medicines.    If the area involved is on your face, you may be referred to a specialist, such as an eye doctor (ophthalmologist) or an ear, nose, and throat (ENT) doctor to help you avoid eye problems, chronic pain, or disability.    HOME CARE INSTRUCTIONS  Medicines    Take medicines only as directed by your health care provider.  Apply an anti-itch or numbing cream to the affected area as directed by your health care provider.    Blister and Rash Care    Take a cool bath or apply cool compresses to the area of the rash or blisters as directed by your health care provider. This may help with pain and itching.  Keep your rash covered with a loose bandage (dressing). Wear loose-fitting clothing to help ease the pain of material rubbing against the rash.  Keep your rash and blisters clean with mild soap and cool water or as directed by your health care provider.  Check your rash every day for signs of infection. These include redness, swelling, and pain that lasts or increases.  Do not pick your blisters.  Do not scratch your rash.    General Instructions    Rest as directed by your health care provider.  Keep all follow-up visits as directed by your health care provider. This is important.  Until your blisters scab over, your infection can cause chickenpox in people who have never had it or been vaccinated against it. To prevent this from happening, avoid contact with other people, especially:  Babies.  Pregnant women.  Children who have eczema.  Elderly people who have transplants.  People who have chronic illnesses, such as leukemia or AIDS.    SEEK MEDICAL CARE IF:  Your pain is not relieved with prescribed medicines.  Your pain does not get better after the rash heals.  Your rash looks infected. Signs of infection include redness, swelling, and pain that lasts or increases.    SEEK IMMEDIATE MEDICAL CARE IF:  The rash is on your face or nose.  You have facial pain, pain around your eye area, or loss of feeling on one side of your face.  You have ear pain or you have ringing in your ear.  You have loss of taste.  Your condition gets worse.    ADDITIONAL NOTES AND INSTRUCTIONS    Please follow up with your Primary MD in 24-48 hr.  Seek immediate medical care for any new/worsening signs or symptoms.    Please follow up with your primary care doctor within one week.

## 2021-07-04 NOTE — ED PROVIDER NOTE - OBJECTIVE STATEMENT
80 year old F with hx of HTN, HLD, CAD s/p stent c/o L sided flank pain x 3 weeks. PT was seen in ED for symptoms on 06/19 80 year old F with hx of HTN, HLD, CAD s/p stent c/o L sided flank pain x 3 weeks. Pt was seen in ED for symptoms on 06/19 and dx with shingles. Sts rash has been improving but still having burning pain to area. Sts was given amitriptyline by pmd without relief. Denies any fever/chills, nausea, vomiting, diarrhea, urinary symptoms.

## 2021-07-04 NOTE — ED ADULT NURSE NOTE - OBJECTIVE STATEMENT
pt 79 y/o female presents s/p [place] pt states she is still having back pain and abdominal pain . as per daughter pain is getting unbearable and lidocaine patches only work for a short amount of time.

## 2021-07-04 NOTE — ED PROVIDER NOTE - CLINICAL SUMMARY MEDICAL DECISION MAKING FREE TEXT BOX
79 y/o F presents for persistent pain post shingles, will try pt on Gabapentin and Lidocaine cream and have pt f/u with pain management.

## 2021-07-04 NOTE — ED PROVIDER NOTE - PHYSICAL EXAMINATION
CONSTITUTIONAL: Well-appearing; well-nourished; in no apparent distress.   NECK: Supple; non-tender; no cervical lymphadenopathy.   CARDIOVASCULAR: Normal S1, S2; no murmurs, rubs, or gallops.   RESPIRATORY: Normal chest excursion with respiration; breath sounds clear and equal bilaterally; no wheezes, rhonchi, or rales.  GI/: Normal bowel sounds; non-distended; non-tender; no palpable organomegaly.   MS: No evidence of trauma or deformity. Normal ROM in all four extremities; non-tender to palpation; distal pulses are normal.   SKIN: + vesicular rash o  flank/ LLQ, + ttp  NEURO/PSYCH: A & O x 4; grossly unremarkable. mood and manner are appropriate.

## 2021-07-05 ENCOUNTER — INPATIENT (INPATIENT)
Facility: HOSPITAL | Age: 81
LOS: 2 days | Discharge: HOME | End: 2021-07-08
Attending: HOSPITALIST | Admitting: HOSPITALIST
Payer: MEDICARE

## 2021-07-05 VITALS
HEIGHT: 63 IN | OXYGEN SATURATION: 99 % | HEART RATE: 73 BPM | DIASTOLIC BLOOD PRESSURE: 68 MMHG | RESPIRATION RATE: 18 BRPM | WEIGHT: 184.09 LBS | SYSTOLIC BLOOD PRESSURE: 114 MMHG | TEMPERATURE: 98 F

## 2021-07-05 DIAGNOSIS — Z98.890 OTHER SPECIFIED POSTPROCEDURAL STATES: Chronic | ICD-10-CM

## 2021-07-05 DIAGNOSIS — Z90.89 ACQUIRED ABSENCE OF OTHER ORGANS: Chronic | ICD-10-CM

## 2021-07-05 DIAGNOSIS — Z90.49 ACQUIRED ABSENCE OF OTHER SPECIFIED PARTS OF DIGESTIVE TRACT: Chronic | ICD-10-CM

## 2021-07-05 LAB
ALBUMIN SERPL ELPH-MCNC: 4.3 G/DL — SIGNIFICANT CHANGE UP (ref 3.5–5.2)
ALP SERPL-CCNC: 155 U/L — HIGH (ref 30–115)
ALT FLD-CCNC: 20 U/L — SIGNIFICANT CHANGE UP (ref 0–41)
ANION GAP SERPL CALC-SCNC: 12 MMOL/L — SIGNIFICANT CHANGE UP (ref 7–14)
APPEARANCE UR: CLEAR — SIGNIFICANT CHANGE UP
AST SERPL-CCNC: 22 U/L — SIGNIFICANT CHANGE UP (ref 0–41)
BASOPHILS # BLD AUTO: 0.02 K/UL — SIGNIFICANT CHANGE UP (ref 0–0.2)
BASOPHILS NFR BLD AUTO: 0.2 % — SIGNIFICANT CHANGE UP (ref 0–1)
BILIRUB SERPL-MCNC: 0.5 MG/DL — SIGNIFICANT CHANGE UP (ref 0.2–1.2)
BILIRUB UR-MCNC: NEGATIVE — SIGNIFICANT CHANGE UP
BUN SERPL-MCNC: 10 MG/DL — SIGNIFICANT CHANGE UP (ref 10–20)
CALCIUM SERPL-MCNC: 9.8 MG/DL — SIGNIFICANT CHANGE UP (ref 8.5–10.1)
CHLORIDE SERPL-SCNC: 101 MMOL/L — SIGNIFICANT CHANGE UP (ref 98–110)
CO2 SERPL-SCNC: 26 MMOL/L — SIGNIFICANT CHANGE UP (ref 17–32)
COLOR SPEC: SIGNIFICANT CHANGE UP
CREAT SERPL-MCNC: 0.8 MG/DL — SIGNIFICANT CHANGE UP (ref 0.7–1.5)
DIFF PNL FLD: NEGATIVE — SIGNIFICANT CHANGE UP
EOSINOPHIL # BLD AUTO: 0.08 K/UL — SIGNIFICANT CHANGE UP (ref 0–0.7)
EOSINOPHIL NFR BLD AUTO: 0.6 % — SIGNIFICANT CHANGE UP (ref 0–8)
GLUCOSE SERPL-MCNC: 100 MG/DL — HIGH (ref 70–99)
GLUCOSE UR QL: NEGATIVE — SIGNIFICANT CHANGE UP
HCT VFR BLD CALC: 40.7 % — SIGNIFICANT CHANGE UP (ref 37–47)
HGB BLD-MCNC: 13.9 G/DL — SIGNIFICANT CHANGE UP (ref 12–16)
IMM GRANULOCYTES NFR BLD AUTO: 0.3 % — SIGNIFICANT CHANGE UP (ref 0.1–0.3)
KETONES UR-MCNC: SIGNIFICANT CHANGE UP
LACTATE SERPL-SCNC: 1.1 MMOL/L — SIGNIFICANT CHANGE UP (ref 0.7–2)
LEUKOCYTE ESTERASE UR-ACNC: NEGATIVE — SIGNIFICANT CHANGE UP
LIDOCAIN IGE QN: 45 U/L — SIGNIFICANT CHANGE UP (ref 7–60)
LYMPHOCYTES # BLD AUTO: 1.97 K/UL — SIGNIFICANT CHANGE UP (ref 1.2–3.4)
LYMPHOCYTES # BLD AUTO: 15.8 % — LOW (ref 20.5–51.1)
MCHC RBC-ENTMCNC: 31 PG — SIGNIFICANT CHANGE UP (ref 27–31)
MCHC RBC-ENTMCNC: 34.2 G/DL — SIGNIFICANT CHANGE UP (ref 32–37)
MCV RBC AUTO: 90.6 FL — SIGNIFICANT CHANGE UP (ref 81–99)
MONOCYTES # BLD AUTO: 1.02 K/UL — HIGH (ref 0.1–0.6)
MONOCYTES NFR BLD AUTO: 8.2 % — SIGNIFICANT CHANGE UP (ref 1.7–9.3)
NEUTROPHILS # BLD AUTO: 9.3 K/UL — HIGH (ref 1.4–6.5)
NEUTROPHILS NFR BLD AUTO: 74.9 % — SIGNIFICANT CHANGE UP (ref 42.2–75.2)
NITRITE UR-MCNC: NEGATIVE — SIGNIFICANT CHANGE UP
NRBC # BLD: 0 /100 WBCS — SIGNIFICANT CHANGE UP (ref 0–0)
PH UR: 6 — SIGNIFICANT CHANGE UP (ref 5–8)
PLATELET # BLD AUTO: 310 K/UL — SIGNIFICANT CHANGE UP (ref 130–400)
POTASSIUM SERPL-MCNC: 4 MMOL/L — SIGNIFICANT CHANGE UP (ref 3.5–5)
POTASSIUM SERPL-SCNC: 4 MMOL/L — SIGNIFICANT CHANGE UP (ref 3.5–5)
PROT SERPL-MCNC: 8 G/DL — SIGNIFICANT CHANGE UP (ref 6–8)
PROT UR-MCNC: NEGATIVE — SIGNIFICANT CHANGE UP
RBC # BLD: 4.49 M/UL — SIGNIFICANT CHANGE UP (ref 4.2–5.4)
RBC # FLD: 14.2 % — SIGNIFICANT CHANGE UP (ref 11.5–14.5)
SODIUM SERPL-SCNC: 139 MMOL/L — SIGNIFICANT CHANGE UP (ref 135–146)
SP GR SPEC: 1.01 — SIGNIFICANT CHANGE UP (ref 1.01–1.03)
UROBILINOGEN FLD QL: SIGNIFICANT CHANGE UP
WBC # BLD: 12.43 K/UL — HIGH (ref 4.8–10.8)
WBC # FLD AUTO: 12.43 K/UL — HIGH (ref 4.8–10.8)

## 2021-07-05 PROCEDURE — 36000 PLACE NEEDLE IN VEIN: CPT

## 2021-07-05 PROCEDURE — 99285 EMERGENCY DEPT VISIT HI MDM: CPT | Mod: 25

## 2021-07-05 PROCEDURE — 76937 US GUIDE VASCULAR ACCESS: CPT | Mod: 26

## 2021-07-05 RX ORDER — MORPHINE SULFATE 50 MG/1
4 CAPSULE, EXTENDED RELEASE ORAL ONCE
Refills: 0 | Status: DISCONTINUED | OUTPATIENT
Start: 2021-07-05 | End: 2021-07-05

## 2021-07-05 RX ORDER — ONDANSETRON 8 MG/1
4 TABLET, FILM COATED ORAL ONCE
Refills: 0 | Status: COMPLETED | OUTPATIENT
Start: 2021-07-05 | End: 2021-07-05

## 2021-07-05 RX ORDER — SODIUM CHLORIDE 9 MG/ML
1000 INJECTION, SOLUTION INTRAVENOUS ONCE
Refills: 0 | Status: COMPLETED | OUTPATIENT
Start: 2021-07-05 | End: 2021-07-05

## 2021-07-05 RX ADMIN — SODIUM CHLORIDE 1000 MILLILITER(S): 9 INJECTION, SOLUTION INTRAVENOUS at 21:53

## 2021-07-05 RX ADMIN — MORPHINE SULFATE 4 MILLIGRAM(S): 50 CAPSULE, EXTENDED RELEASE ORAL at 21:52

## 2021-07-05 RX ADMIN — ONDANSETRON 4 MILLIGRAM(S): 8 TABLET, FILM COATED ORAL at 21:56

## 2021-07-05 NOTE — ED ADULT TRIAGE NOTE - CHIEF COMPLAINT QUOTE
Patient c/o lower abdominal with nausea and 3 episodes of bright red blood that started this morning patient ton Eliquis .

## 2021-07-05 NOTE — ED PROCEDURE NOTE - ATTENDING CONTRIBUTION TO CARE
I personally supervised the study.  I reviewed the images and interpretation by the resident/ACP and have edited where appropriate. Patient with difficult IV access I intend to get an US guided IV. I was present for and supervised the key/critical aspects of the procedures performed during the care of the patient.--US guided IV placement

## 2021-07-06 LAB
CULTURE RESULTS: SIGNIFICANT CHANGE UP
SARS-COV-2 RNA SPEC QL NAA+PROBE: SIGNIFICANT CHANGE UP
SPECIMEN SOURCE: SIGNIFICANT CHANGE UP

## 2021-07-06 PROCEDURE — 99223 1ST HOSP IP/OBS HIGH 75: CPT

## 2021-07-06 PROCEDURE — 74177 CT ABD & PELVIS W/CONTRAST: CPT | Mod: 26,MA

## 2021-07-06 RX ORDER — MORPHINE SULFATE 50 MG/1
2 CAPSULE, EXTENDED RELEASE ORAL EVERY 6 HOURS
Refills: 0 | Status: DISCONTINUED | OUTPATIENT
Start: 2021-07-06 | End: 2021-07-08

## 2021-07-06 RX ORDER — LISINOPRIL 2.5 MG/1
20 TABLET ORAL DAILY
Refills: 0 | Status: DISCONTINUED | OUTPATIENT
Start: 2021-07-06 | End: 2021-07-08

## 2021-07-06 RX ORDER — METRONIDAZOLE 500 MG
500 TABLET ORAL ONCE
Refills: 0 | Status: COMPLETED | OUTPATIENT
Start: 2021-07-06 | End: 2021-07-06

## 2021-07-06 RX ORDER — SODIUM CHLORIDE 9 MG/ML
1000 INJECTION INTRAMUSCULAR; INTRAVENOUS; SUBCUTANEOUS
Refills: 0 | Status: DISCONTINUED | OUTPATIENT
Start: 2021-07-06 | End: 2021-07-08

## 2021-07-06 RX ORDER — ENOXAPARIN SODIUM 100 MG/ML
40 INJECTION SUBCUTANEOUS DAILY
Refills: 0 | Status: DISCONTINUED | OUTPATIENT
Start: 2021-07-06 | End: 2021-07-08

## 2021-07-06 RX ORDER — ONDANSETRON 8 MG/1
4 TABLET, FILM COATED ORAL EVERY 8 HOURS
Refills: 0 | Status: DISCONTINUED | OUTPATIENT
Start: 2021-07-06 | End: 2021-07-08

## 2021-07-06 RX ORDER — GABAPENTIN 400 MG/1
300 CAPSULE ORAL THREE TIMES A DAY
Refills: 0 | Status: DISCONTINUED | OUTPATIENT
Start: 2021-07-06 | End: 2021-07-08

## 2021-07-06 RX ORDER — LIDOCAINE 4 G/100G
1 CREAM TOPICAL EVERY 24 HOURS
Refills: 0 | Status: COMPLETED | OUTPATIENT
Start: 2021-07-06 | End: 2021-07-08

## 2021-07-06 RX ORDER — CLOPIDOGREL BISULFATE 75 MG/1
75 TABLET, FILM COATED ORAL DAILY
Refills: 0 | Status: DISCONTINUED | OUTPATIENT
Start: 2021-07-06 | End: 2021-07-08

## 2021-07-06 RX ORDER — OXYCODONE AND ACETAMINOPHEN 5; 325 MG/1; MG/1
1 TABLET ORAL EVERY 6 HOURS
Refills: 0 | Status: DISCONTINUED | OUTPATIENT
Start: 2021-07-06 | End: 2021-07-08

## 2021-07-06 RX ORDER — CIPROFLOXACIN LACTATE 400MG/40ML
400 VIAL (ML) INTRAVENOUS EVERY 12 HOURS
Refills: 0 | Status: DISCONTINUED | OUTPATIENT
Start: 2021-07-06 | End: 2021-07-08

## 2021-07-06 RX ORDER — PANTOPRAZOLE SODIUM 20 MG/1
40 TABLET, DELAYED RELEASE ORAL
Refills: 0 | Status: DISCONTINUED | OUTPATIENT
Start: 2021-07-06 | End: 2021-07-08

## 2021-07-06 RX ORDER — METRONIDAZOLE 500 MG
500 TABLET ORAL EVERY 8 HOURS
Refills: 0 | Status: DISCONTINUED | OUTPATIENT
Start: 2021-07-06 | End: 2021-07-08

## 2021-07-06 RX ORDER — CHLORHEXIDINE GLUCONATE 213 G/1000ML
1 SOLUTION TOPICAL
Refills: 0 | Status: DISCONTINUED | OUTPATIENT
Start: 2021-07-06 | End: 2021-07-08

## 2021-07-06 RX ORDER — ATORVASTATIN CALCIUM 80 MG/1
40 TABLET, FILM COATED ORAL AT BEDTIME
Refills: 0 | Status: DISCONTINUED | OUTPATIENT
Start: 2021-07-06 | End: 2021-07-08

## 2021-07-06 RX ORDER — CIPROFLOXACIN LACTATE 400MG/40ML
400 VIAL (ML) INTRAVENOUS ONCE
Refills: 0 | Status: COMPLETED | OUTPATIENT
Start: 2021-07-06 | End: 2021-07-06

## 2021-07-06 RX ORDER — ASPIRIN/CALCIUM CARB/MAGNESIUM 324 MG
1 TABLET ORAL
Qty: 0 | Refills: 0 | DISCHARGE

## 2021-07-06 RX ADMIN — Medication 100 MILLIGRAM(S): at 02:15

## 2021-07-06 RX ADMIN — LIDOCAINE 1 PATCH: 4 CREAM TOPICAL at 20:00

## 2021-07-06 RX ADMIN — Medication 200 MILLIGRAM(S): at 03:15

## 2021-07-06 RX ADMIN — ATORVASTATIN CALCIUM 40 MILLIGRAM(S): 80 TABLET, FILM COATED ORAL at 21:23

## 2021-07-06 RX ADMIN — LIDOCAINE 1 PATCH: 4 CREAM TOPICAL at 16:50

## 2021-07-06 RX ADMIN — Medication 200 MILLIGRAM(S): at 17:01

## 2021-07-06 RX ADMIN — Medication 500 MILLIGRAM(S): at 13:08

## 2021-07-06 RX ADMIN — Medication 500 MILLIGRAM(S): at 21:23

## 2021-07-06 RX ADMIN — ENOXAPARIN SODIUM 40 MILLIGRAM(S): 100 INJECTION SUBCUTANEOUS at 11:33

## 2021-07-06 RX ADMIN — GABAPENTIN 300 MILLIGRAM(S): 400 CAPSULE ORAL at 13:08

## 2021-07-06 RX ADMIN — SODIUM CHLORIDE 60 MILLILITER(S): 9 INJECTION INTRAMUSCULAR; INTRAVENOUS; SUBCUTANEOUS at 14:10

## 2021-07-06 RX ADMIN — CLOPIDOGREL BISULFATE 75 MILLIGRAM(S): 75 TABLET, FILM COATED ORAL at 11:33

## 2021-07-06 RX ADMIN — GABAPENTIN 300 MILLIGRAM(S): 400 CAPSULE ORAL at 21:23

## 2021-07-06 NOTE — ED PROVIDER NOTE - PHYSICAL EXAMINATION
CONSTITUTIONAL: Well-appearing; well-nourished; in no apparent distress.   CARDIOVASCULAR: Normal S1, S2; no murmurs, rubs, or gallops.   RESPIRATORY: Normal chest excursion with respiration; breath sounds clear and equal bilaterally; no wheezes, rhonchi, or rales.  GI/: ttp lower abd, non-distended; no palpable organomegaly.   SKIN: Normal for age and race; warm; dry; good turgor; no apparent lesions or exudate.   NEURO/PSYCH: A & O x 4; grossly unremarkable. mood and manner are appropriate.

## 2021-07-06 NOTE — ED PROVIDER NOTE - OBJECTIVE STATEMENT
pt with pmhx CAD, HLD, HTN and diverticulitis presents to ED with daughter c/o abd pain and bloody stool today, c/w prior episodes of diverticulitis. pain is sharp, nonradiating, moderate. denies exacerbating or relieving factors. Denies fever/chill/HA/dizziness/chest pain/palpitation/sob/n/v/d/ black stool/urinary sxs

## 2021-07-06 NOTE — ED PROVIDER NOTE - CLINICAL SUMMARY MEDICAL DECISION MAKING FREE TEXT BOX
81 yo female with PMH of HTN, cholecystectomy, shingles (to the left back area--already finished Valtrex), diverticulitis, HLD, neuropathy who presents to the ER for loose stools that are blood tinged, lower abdomen pain, +nausea, no vomiting, decreased appetite, no fever/chills/cp/sob/dysuria/back pain/HA/neck pain. Pt states this feels like her previous episodes of diverticulitis. Pt is no longer on Eliquis but does take Plavix. On exam +tenderness across the lower abdomen but also to the periumbilical/RUQ area on exam, no rebound/guarding/mass, +BS. Lab, UA, CT abd/pelvis ordered and pt found to have colitis of the transverse colon. Given age, use of plavix, bleeding per rectum, and colitis, will admit for IV abx.

## 2021-07-06 NOTE — ED PROVIDER NOTE - ATTENDING CONTRIBUTION TO CARE
79 yo female with PMH of HTN, cholecystectomy, shingles (to the left back area--already finished Valtrex), diverticulitis, HLD, neuropathy who presents to the ER for loose stools that are blood tinged, lower abdomen pain, +nausea, no vomiting, decreased appetite, no fever/chills/cp/sob/dysuria/back pain/HA/neck pain. Pt states this feels like her previous episodes of diverticulitis. Pt is no longer on Eliquis but does take Plavix. On exam +tenderness across the lower abdomen but also to the periumbilical/RUQ area on exam, no rebound/guarding/mass, +BS. Lab, UA, CT abd/pelvis ordered and pt found to have colitis of the transverse colon. Given age, use of plavix, bleeding per rectum, and colitis, will admit for IV abx.     ALL: nkda  PMH as above  Meds: atorvastatin, meclizine, plavix, lisinopril, metoprolol  SH: +smoker, no etoh  PMD Philip Lara

## 2021-07-06 NOTE — H&P ADULT - ASSESSMENT
79 yo female patient with PMH of shingles, diverticulitis, HTN, CAD presents for lower abdominal pain, nausea and diarrhea and hematochezia. CT a/p showed evidence of colitis in the transverse colon.    # Colitis in the transverse colon  - Not in sepsis on admission  - No recent antibiotic use  - lactate 1.1, less likely ischemic or IBD  - GI PCR, C diff toxins  - s/p cipro and flagyl in the ED  - c/w cipro and flagyl  - IVF hydration  - pain control  - symptomatic treatment  - advance diet as tolerated    # Hx of zoster shingles  - finished valtrex course  - Still complaining of pain, likely secondary to post herpetic neuralgia  - c/w gabapentin    # HTN  - c/w lisinopril  - Monitor BP    # CAD s/p stent  - c/w plavix and statins    # DVT ppx: lovenox  # PPI ppx: pantoprazole  # DIET: DASH  # ACTIVITY: IAT  # DISPO: From home

## 2021-07-06 NOTE — H&P ADULT - NSHPLABSRESULTS_GEN_ALL_CORE
13.9   12.43 )-----------( 310      ( 05 Jul 2021 21:04 )             40.7   07-05    139  |  101  |  10  ----------------------------<  100<H>  4.0   |  26  |  0.8    Ca    9.8      05 Jul 2021 21:04    TPro  8.0  /  Alb  4.3  /  TBili  0.5  /  DBili  x   /  AST  22  /  ALT  20  /  AlkPhos  155<H>  07-05    < from: CT Abdomen and Pelvis w/ IV Cont (07.06.21 @ 00:55) >    IMPRESSION:      Findings compatible with colitis involving transverse colon. Differential diagnoses includes infectious as well as inflammatory etiology.    No evidence of drainable fluid collection or pneumoperitoneum.    Dilated biliary system may be secondary to a cholecystectomy.    < end of copied text >

## 2021-07-06 NOTE — H&P ADULT - HISTORY OF PRESENT ILLNESS
81 yo female patient with PMH of shingles, diverticulitis, HTN, CAD presents for lower abdominal pain. Symptoms started yesterday morning with acute onset of lower abdominal pain, sharp, moderate to intense in severity, non radiating with no alleviating or exacerbating factors, similar in nature for the episode of diverticulitis she had 20 years ago associated with loose bowel movements tinged with blood. She also mentions decrease in appetite with mild nausea but no episodes of vomiting. To note that she was diagnosed with zoster 3 weeks ago for which she was prescribed valtrex that she finished but she is still complaining of pain in her left upper back and anterior ribs region. She denied recent antibiotic use, fevers, chills, chest pain, shortness of breath, urinary or other associated symptoms.    In the ED VS were within normal.  CT a/p showed evidence of colitis in the transverse colon.  She received 1 L of LR bolus along with 1 dose of cipro and flagyl.

## 2021-07-06 NOTE — H&P ADULT - ATTENDING COMMENTS
Patient seen and examined independently. Agree with resident note/ history / physical exam and plan of care with following exceptions/additions/updates. Case discussed with patient/pt decision maker, house-staff and nursing. My notes supersedes the resident's notes in case of discrepancies.  severe edema and skin changes on the right arm.   rule out abscess, check ct Upper ext, burn consult       #Progress Note Handoff  Pending (specify):  Consults; Burn   Family discussion: dw pt , fully aaox3  Disposition: Home_ when stable Patient seen and examined independently. Agree with resident note/ history / physical exam and plan of care with following exceptions/additions/updates. Case discussed with patient/pt decision maker, house-staff and nursing. My notes supersedes the resident's notes in case of discrepancies.      #Progress Note Handoff  Pending (specify):  Consults  Family discussion: anitha pt , fully aaox3  Disposition: Home_ when stable Patient seen and examined independently. Agree with resident note/ history / physical exam and plan of care with following exceptions/additions/updates. Case discussed with patient/pt decision maker, house-staff and nursing. My notes supersedes the resident's notes in case of discrepancies.    diarrhea is better, abd pain is minimal,   flank pain ( site of the shingles lesion has no open lesions, no blister, healed scar, not tender)   add lidocaine on flank area  cont abx for colitis. monitor labs  PT eval   start clears when pain is controlled.     #Progress Note Handoff  Pending (specify):  Consults, GI   Family discussion: anitha pt , fully aaox3  Disposition: Home_ when stable

## 2021-07-07 ENCOUNTER — TRANSCRIPTION ENCOUNTER (OUTPATIENT)
Age: 81
End: 2021-07-07

## 2021-07-07 LAB
ALBUMIN SERPL ELPH-MCNC: 3.2 G/DL — LOW (ref 3.5–5.2)
ALP SERPL-CCNC: 102 U/L — SIGNIFICANT CHANGE UP (ref 30–115)
ALT FLD-CCNC: 12 U/L — SIGNIFICANT CHANGE UP (ref 0–41)
ANION GAP SERPL CALC-SCNC: 10 MMOL/L — SIGNIFICANT CHANGE UP (ref 7–14)
AST SERPL-CCNC: 17 U/L — SIGNIFICANT CHANGE UP (ref 0–41)
BASOPHILS # BLD AUTO: 0.03 K/UL — SIGNIFICANT CHANGE UP (ref 0–0.2)
BASOPHILS NFR BLD AUTO: 0.4 % — SIGNIFICANT CHANGE UP (ref 0–1)
BILIRUB SERPL-MCNC: 0.5 MG/DL — SIGNIFICANT CHANGE UP (ref 0.2–1.2)
BUN SERPL-MCNC: 5 MG/DL — LOW (ref 10–20)
CALCIUM SERPL-MCNC: 8.9 MG/DL — SIGNIFICANT CHANGE UP (ref 8.5–10.1)
CHLORIDE SERPL-SCNC: 105 MMOL/L — SIGNIFICANT CHANGE UP (ref 98–110)
CO2 SERPL-SCNC: 24 MMOL/L — SIGNIFICANT CHANGE UP (ref 17–32)
COVID-19 SPIKE DOMAIN AB INTERP: POSITIVE
COVID-19 SPIKE DOMAIN ANTIBODY RESULT: >250 U/ML — HIGH
CREAT SERPL-MCNC: 0.5 MG/DL — LOW (ref 0.7–1.5)
EOSINOPHIL # BLD AUTO: 0.21 K/UL — SIGNIFICANT CHANGE UP (ref 0–0.7)
EOSINOPHIL NFR BLD AUTO: 2.6 % — SIGNIFICANT CHANGE UP (ref 0–8)
GLUCOSE SERPL-MCNC: 98 MG/DL — SIGNIFICANT CHANGE UP (ref 70–99)
HCT VFR BLD CALC: 32.4 % — LOW (ref 37–47)
HGB BLD-MCNC: 10.8 G/DL — LOW (ref 12–16)
IMM GRANULOCYTES NFR BLD AUTO: 0.4 % — HIGH (ref 0.1–0.3)
LYMPHOCYTES # BLD AUTO: 2.11 K/UL — SIGNIFICANT CHANGE UP (ref 1.2–3.4)
LYMPHOCYTES # BLD AUTO: 26.1 % — SIGNIFICANT CHANGE UP (ref 20.5–51.1)
MAGNESIUM SERPL-MCNC: 1.8 MG/DL — SIGNIFICANT CHANGE UP (ref 1.8–2.4)
MCHC RBC-ENTMCNC: 30.7 PG — SIGNIFICANT CHANGE UP (ref 27–31)
MCHC RBC-ENTMCNC: 33.3 G/DL — SIGNIFICANT CHANGE UP (ref 32–37)
MCV RBC AUTO: 92 FL — SIGNIFICANT CHANGE UP (ref 81–99)
MONOCYTES # BLD AUTO: 1.01 K/UL — HIGH (ref 0.1–0.6)
MONOCYTES NFR BLD AUTO: 12.5 % — HIGH (ref 1.7–9.3)
NEUTROPHILS # BLD AUTO: 4.68 K/UL — SIGNIFICANT CHANGE UP (ref 1.4–6.5)
NEUTROPHILS NFR BLD AUTO: 58 % — SIGNIFICANT CHANGE UP (ref 42.2–75.2)
NRBC # BLD: 0 /100 WBCS — SIGNIFICANT CHANGE UP (ref 0–0)
PLATELET # BLD AUTO: 245 K/UL — SIGNIFICANT CHANGE UP (ref 130–400)
POTASSIUM SERPL-MCNC: 3.6 MMOL/L — SIGNIFICANT CHANGE UP (ref 3.5–5)
POTASSIUM SERPL-SCNC: 3.6 MMOL/L — SIGNIFICANT CHANGE UP (ref 3.5–5)
PROT SERPL-MCNC: 5.9 G/DL — LOW (ref 6–8)
RBC # BLD: 3.52 M/UL — LOW (ref 4.2–5.4)
RBC # FLD: 14.6 % — HIGH (ref 11.5–14.5)
SARS-COV-2 IGG+IGM SERPL QL IA: >250 U/ML — HIGH
SARS-COV-2 IGG+IGM SERPL QL IA: POSITIVE
SODIUM SERPL-SCNC: 139 MMOL/L — SIGNIFICANT CHANGE UP (ref 135–146)
WBC # BLD: 8.07 K/UL — SIGNIFICANT CHANGE UP (ref 4.8–10.8)
WBC # FLD AUTO: 8.07 K/UL — SIGNIFICANT CHANGE UP (ref 4.8–10.8)

## 2021-07-07 PROCEDURE — 99232 SBSQ HOSP IP/OBS MODERATE 35: CPT

## 2021-07-07 RX ORDER — CLOPIDOGREL BISULFATE 75 MG/1
0 TABLET, FILM COATED ORAL
Qty: 0 | Refills: 0 | DISCHARGE

## 2021-07-07 RX ORDER — SACCHAROMYCES BOULARDII 250 MG
1 POWDER IN PACKET (EA) ORAL
Qty: 0 | Refills: 0 | DISCHARGE
Start: 2021-07-07

## 2021-07-07 RX ORDER — SACCHAROMYCES BOULARDII 250 MG
250 POWDER IN PACKET (EA) ORAL
Refills: 0 | Status: DISCONTINUED | OUTPATIENT
Start: 2021-07-07 | End: 2021-07-08

## 2021-07-07 RX ORDER — METRONIDAZOLE 500 MG
1 TABLET ORAL
Qty: 0 | Refills: 0 | DISCHARGE
Start: 2021-07-07

## 2021-07-07 RX ORDER — SODIUM CHLORIDE 9 MG/ML
500 INJECTION INTRAMUSCULAR; INTRAVENOUS; SUBCUTANEOUS ONCE
Refills: 0 | Status: COMPLETED | OUTPATIENT
Start: 2021-07-07 | End: 2021-07-07

## 2021-07-07 RX ORDER — SENNA PLUS 8.6 MG/1
2 TABLET ORAL AT BEDTIME
Refills: 0 | Status: DISCONTINUED | OUTPATIENT
Start: 2021-07-07 | End: 2021-07-08

## 2021-07-07 RX ORDER — MOXIFLOXACIN HYDROCHLORIDE TABLETS, 400 MG 400 MG/1
1 TABLET, FILM COATED ORAL
Qty: 0 | Refills: 0 | DISCHARGE
Start: 2021-07-07

## 2021-07-07 RX ADMIN — Medication 10 MILLIGRAM(S): at 17:19

## 2021-07-07 RX ADMIN — MORPHINE SULFATE 2 MILLIGRAM(S): 50 CAPSULE, EXTENDED RELEASE ORAL at 03:21

## 2021-07-07 RX ADMIN — LISINOPRIL 20 MILLIGRAM(S): 2.5 TABLET ORAL at 05:03

## 2021-07-07 RX ADMIN — Medication 500 MILLIGRAM(S): at 05:03

## 2021-07-07 RX ADMIN — Medication 500 MILLIGRAM(S): at 21:04

## 2021-07-07 RX ADMIN — SENNA PLUS 2 TABLET(S): 8.6 TABLET ORAL at 21:04

## 2021-07-07 RX ADMIN — LIDOCAINE 1 PATCH: 4 CREAM TOPICAL at 04:59

## 2021-07-07 RX ADMIN — PANTOPRAZOLE SODIUM 40 MILLIGRAM(S): 20 TABLET, DELAYED RELEASE ORAL at 06:22

## 2021-07-07 RX ADMIN — Medication 500 MILLIGRAM(S): at 12:46

## 2021-07-07 RX ADMIN — ENOXAPARIN SODIUM 40 MILLIGRAM(S): 100 INJECTION SUBCUTANEOUS at 12:45

## 2021-07-07 RX ADMIN — CHLORHEXIDINE GLUCONATE 1 APPLICATION(S): 213 SOLUTION TOPICAL at 05:04

## 2021-07-07 RX ADMIN — GABAPENTIN 300 MILLIGRAM(S): 400 CAPSULE ORAL at 12:45

## 2021-07-07 RX ADMIN — CLOPIDOGREL BISULFATE 75 MILLIGRAM(S): 75 TABLET, FILM COATED ORAL at 12:46

## 2021-07-07 RX ADMIN — SODIUM CHLORIDE 1000 MILLILITER(S): 9 INJECTION INTRAMUSCULAR; INTRAVENOUS; SUBCUTANEOUS at 17:24

## 2021-07-07 RX ADMIN — Medication 10 MILLIGRAM(S): at 12:45

## 2021-07-07 RX ADMIN — Medication 250 MILLIGRAM(S): at 17:19

## 2021-07-07 RX ADMIN — GABAPENTIN 300 MILLIGRAM(S): 400 CAPSULE ORAL at 05:03

## 2021-07-07 RX ADMIN — ATORVASTATIN CALCIUM 40 MILLIGRAM(S): 80 TABLET, FILM COATED ORAL at 21:04

## 2021-07-07 RX ADMIN — GABAPENTIN 300 MILLIGRAM(S): 400 CAPSULE ORAL at 21:04

## 2021-07-07 RX ADMIN — Medication 200 MILLIGRAM(S): at 17:19

## 2021-07-07 RX ADMIN — Medication 200 MILLIGRAM(S): at 05:05

## 2021-07-07 RX ADMIN — MORPHINE SULFATE 2 MILLIGRAM(S): 50 CAPSULE, EXTENDED RELEASE ORAL at 03:51

## 2021-07-07 NOTE — DISCHARGE NOTE PROVIDER - PROVIDER TOKENS
PROVIDER:[TOKEN:[[id number]],FOLLOWUP:[2 weeks]] PROVIDER:[TOKEN:[[id number]],FOLLOWUP:[2 weeks]],PROVIDER:[TOKEN:[[id number]],FOLLOWUP:[2 weeks]] PROVIDER:[TOKEN:[[id number]],FOLLOWUP:[2 weeks]],PROVIDER:[TOKEN:[[id number]],FOLLOWUP:[2 weeks]]

## 2021-07-07 NOTE — DISCHARGE NOTE PROVIDER - NSDCMRMEDTOKEN_GEN_ALL_CORE_FT
atorvastatin 40 mg oral tablet: 1 tab(s) orally once a day (at bedtime)   Cipro 500 mg oral tablet: 1 tab(s) orally every 12 hours  dicyclomine 10 mg oral capsule: 1 cap(s) orally 3 times a day (before meals) for 5 days  gabapentin 300 mg oral capsule: 1 cap(s) orally every 12 hours   lisinopril 20 mg oral tablet: 1 tab(s) orally once a day  metroNIDAZOLE 500 mg oral tablet: 1 tab(s) orally every 8 hours  oxyCODONE-acetaminophen 5 mg-325 mg oral tablet: 1 tab(s) orally every 4 to 6 hours, As Needed -for severe pain MDD:3  pantoprazole 40 mg oral delayed release tablet: 1 tab(s) orally once a day (before a meal)  Plavix 75 mg oral tablet: 1 tab(s) orally once a day  saccharomyces boulardii lyo 250 mg oral capsule: 1 cap(s) orally 2 times a day   atorvastatin 40 mg oral tablet: 1 tab(s) orally once a day (at bedtime)   Cipro 500 mg oral tablet: 1 tab(s) orally every 12 hours until 07/20  dicyclomine 10 mg oral capsule: 1 cap(s) orally 3 times a day (before meals) for 3 days  gabapentin 300 mg oral capsule: 1 cap(s) orally every 12 hours   metroNIDAZOLE 500 mg oral tablet: 1 tab(s) orally every 8 hours until 07/20  oxyCODONE-acetaminophen 5 mg-325 mg oral tablet: 1 tab(s) orally every 4 to 6 hours, As Needed -for severe pain MDD:3  pantoprazole 40 mg oral delayed release tablet: 1 tab(s) orally once a day (before a meal)  Plavix 75 mg oral tablet: 1 tab(s) orally once a day  saccharomyces boulardii lyo 250 mg oral capsule: 1 cap(s) orally 2 times a day  senna oral tablet: 2 tab(s) orally once a day (at bedtime)

## 2021-07-07 NOTE — PROGRESS NOTE ADULT - ASSESSMENT
81 yo female patient with PMH of shingles, diverticulitis, HTN, CAD presents for lower abdominal pain, nausea and diarrhea and hematochezia. CT a/p showed evidence of colitis in the transverse colon.    # Colitis in the transverse colon  - Not in sepsis on admission  - No recent antibiotic use  - lactate 1.1, less likely ischemic or IBD  - GI PCR, C diff toxins  - s/p cipro and flagyl in the ED  - c/w cipro and flagyl  - IVF hydration  - pain control  - symptomatic treatment  - advance diet as tolerated    # Hx of zoster shingles  - finished valtrex course  - Still complaining of pain, likely secondary to post herpetic neuralgia  - c/w gabapentin, lidocaine patch for flank pain    # HTN  - c/w lisinopril  - Monitor BP    # CAD s/p stent  - c/w plavix and statins    # DVT ppx: lovenox  # PPI ppx: pantoprazole  # DIET: DASH  # ACTIVITY: IAT  # DISPO: From home 79 yo female patient with PMH of shingles, diverticulitis, HTN, CAD presents for lower abdominal pain, nausea and diarrhea and hematochezia. CT a/p showed evidence of colitis in the transverse colon.    # Colitis of the transverse colon  - Not in sepsis on admission  - No recent antibiotic use  - lactate 1.1, less likely ischemic or IBD  - GI PCR, C diff toxins  - s/p cipro and flagyl in the ED  - c/w cipro and flagyl  - IVF hydration  - pain control  - symptomatic treatment  - advance diet as tolerated  - Probiotics    # Hx of zoster shingles  - finished valtrex course  - Still complaining of pain, likely secondary to post herpetic neuralgia  - c/w gabapentin, lidocaine patch for flank pain    # HTN  - c/w lisinopril  - Monitor BP    # CAD s/p stent  - c/w plavix and statins    # DVT ppx: lovenox  # PPI ppx: pantoprazole  # DIET: DASH  # ACTIVITY: IAT  # DISPO: From home 79 yo female patient with PMH of shingles, diverticulitis, HTN, CAD presents for lower abdominal pain, nausea and diarrhea and hematochezia. CT a/p showed evidence of colitis in the transverse colon.    # Colitis of the transverse colon  - Not in sepsis on admission  - No recent antibiotic use  - lactate 1.1, less likely ischemic or IBD  - GI PCR, C diff toxins  - s/p cipro and flagyl in the ED  - c/w cipro and flagyl  - IVF hydration  - pain control  - symptomatic treatment  - advance diet as tolerated  - Probiotics    # h/o shingles  - finished valtrex course  - Still complaining of pain, likely secondary to post herpetic neuralgia  - c/w gabapentin, lidocaine patch for flank pain    # HTN  - c/w lisinopril  - Monitor BP    # CAD s/p stent  - c/w plavix and statins    # DVT ppx: lovenox  # PPI ppx: pantoprazole  # DIET: DASH  # ACTIVITY: IAT  # DISPO: From home 81 yo female patient with PMH of shingles, diverticulitis, HTN, CAD presents for lower abdominal pain, nausea and diarrhea and hematochezia. CT a/p showed evidence of colitis in the transverse colon.    # Colitis of the transverse colon  - Not in sepsis on admission  - No recent antibiotic use  - lactate 1.1, less likely ischemic or IBD  - GI PCR, C diff toxins  - s/p cipro and flagyl in the ED  - c/w cipro and flagyl  - IVF hydration  - pain control  - symptomatic treatment  - advance diet as tolerated  - Probiotics  - outpatient GI follow up for Colonoscopy after resolution of colitis    # h/o shingles  - finished valtrex course  - Still complaining of pain, likely secondary to post herpetic neuralgia  - c/w gabapentin, lidocaine patch for flank pain    # HTN  - c/w lisinopril  - Monitor BP    # CAD s/p stent  - c/w plavix and statins    # DVT ppx: lovenox  # PPI ppx: pantoprazole  # DIET: DASH  # ACTIVITY: IAT  # DISPO: Home

## 2021-07-07 NOTE — DISCHARGE NOTE PROVIDER - NSDCCPCAREPLAN_GEN_ALL_CORE_FT
PRINCIPAL DISCHARGE DIAGNOSIS  Diagnosis: Colitis  Assessment and Plan of Treatment: You came to the hospital because of abdominal pain and diarrhea. In the hospital a CT scan of your abdomen was done which showed inflammation of you colon. You were treated with IV antibiotics in the hospital, you were given IV fluids. The pain and diarrhea has resolved.   Please continue taking the antibiotics exactly as prescribed, drink plently of fluids, eat low fiber diet for 1 week, take the probiotics for 1 month.   FOLLOW UP WITH YOUR PRIMARY CARE PHYSICIAN WITHIN 2 WEEKS. FOLLOW UP WITH A GASTROENTEROLOGIST AFTER 2 WEEKS TO GET A COLONOSCOPY AFTER THE COLITIS HAS RESOLVED.   SEEK IMMEDIATE MEDICAL ATTENTION IF YOU HAVE WORSENING ABDOMINAL PAIN, DIARRHEA, NAUSEA, VOMITING, BLOOD IN STOOL, FEVER, BURNING URINATION.      SECONDARY DISCHARGE DIAGNOSES  Diagnosis: Hypertension  Assessment and Plan of Treatment: Hypertension  Hypertension, commonly called high blood pressure, is when the force of blood pumping through your arteries is too strong. Hypertension forces your heart to work harder to pump blood. Your arteries may become narrow or stiff. Having untreated or uncontrolled hypertension for a long period of time can cause heart attack, stroke, kidney disease, and other problems. If started on a medication, take exactly as prescribed by your health care professional. Maintain a healthy lifestyle and follow up with your primary care physician.  SEEK IMMEDIATE MEDICAL CARE IF YOU HAVE ANY OF THE FOLLOWING SYMPTOMS: severe headache, confusion, chest pain, abdominal pain, vomiting, or shortness of breath.      Diagnosis: CAD (coronary artery disease)  Assessment and Plan of Treatment: Coronary artery disease (CAD) is narrowing of the arteries to your heart caused by a buildup of plaque. Plaque is made up of cholesterol and other substances. The narrowing in your arteries decreases the amount of blood that can flow to your heart. This causes your heart to get less oxygen.  Contact your healthcare provider if:  You have any of the following signs of a heart attack:  Squeezing, pressure, or pain in your chest  and any of the following:  Discomfort or pain in your back, neck, jaw, stomach, or arm  Shortness of breath  Nausea or vomiting  Lightheadedness or a sudden cold sweat  You have chest pain that is more frequent, or you have chest pain at rest.  You have questions or concerns about your condition or care.  Cholesterol medicines help lower blood cholesterol levels.  Nitrates , such as nitroglycerin, relax the arteries of your heart so it gets more oxygen. They help to relieve your chest pain.  Antiplatelets , such as aspirin, and plavix help prevent blood clots. Take your antiplatelet medicine exactly as directed. These medicines make it more likely for you to bleed or bruise. If you are told to take aspirin, do not take acetaminophen or ibuprofen instead.  Blood thinners help prevent blood clots. Examples of blood thinners include heparin and warfarin. Clots can cause strokes, heart attacks, and death. The following are general safety guidelines to follow while you are taking a blood thinner:  Watch for bleeding and bruising while you take blood thinners. Watch for bleeding from your gums or nose. Watch for blood in your urine and bowel movements. This can keep your skin and gums from bleeding. If you shave, use an electric shaver. Do not play contact sports.  Do not start or stop any medicines unless your healthcare provider tells you to.   Manage CAD:  Do not smoke. Nicotine and other chemicals in cigarettes and cigars can cause heart and lung damage.       PRINCIPAL DISCHARGE DIAGNOSIS  Diagnosis: Colitis  Assessment and Plan of Treatment: You came to the hospital because of abdominal pain and diarrhea. In the hospital a CT scan of your abdomen was done which showed inflammation of you colon. You were treated with IV antibiotics in the hospital, you were given IV fluids. The pain and diarrhea has resolved.   Please continue taking the antibiotics exactly as prescribed, drink plently of fluids,  take the probiotics for 1 month.   Take senna daily to avoid constipation.   FOLLOW UP WITH YOUR PRIMARY CARE PHYSICIAN WITHIN 2 WEEKS. FOLLOW UP WITH A GASTROENTEROLOGIST AFTER 2 WEEKS TO GET A COLONOSCOPY AFTER THE COLITIS HAS RESOLVED.   SEEK IMMEDIATE MEDICAL ATTENTION IF YOU HAVE WORSENING ABDOMINAL PAIN, DIARRHEA, NAUSEA, VOMITING, BLOOD IN STOOL, FEVER, BURNING URINATION.      SECONDARY DISCHARGE DIAGNOSES  Diagnosis: Hypertension  Assessment and Plan of Treatment: YOUR ANTIHYPERTENSIVE MEDICATION LISINOPRIL HAS BEEN STOPPED BECAUSE YOUR BLOOD PRESSURE WAS LOW WHILE YOU WERE IN THE HOSPITAL. DON'T TAKE THE MEDICATION FOR 2 WEEKS. FOLLOW UP WITH YOUR PRIMARY CARE PHYSICIAN IN 2 WEEKS TO RECHECK YOUR BLOOD PRESSURE AND RESTART THE MEDICATION AS NEEDED IF BP IS INCREASED.   Hypertension  Hypertension, commonly called high blood pressure, is when the force of blood pumping through your arteries is too strong. Hypertension forces your heart to work harder to pump blood. Your arteries may become narrow or stiff. Having untreated or uncontrolled hypertension for a long period of time can cause heart attack, stroke, kidney disease, and other problems. If started on a medication, take exactly as prescribed by your health care professional. Maintain a healthy lifestyle and follow up with your primary care physician.  SEEK IMMEDIATE MEDICAL CARE IF YOU HAVE ANY OF THE FOLLOWING SYMPTOMS: severe headache, confusion, chest pain, abdominal pain, vomiting, or shortness of breath.      Diagnosis: CAD (coronary artery disease)  Assessment and Plan of Treatment: Coronary artery disease (CAD) is narrowing of the arteries to your heart caused by a buildup of plaque. Plaque is made up of cholesterol and other substances. The narrowing in your arteries decreases the amount of blood that can flow to your heart. This causes your heart to get less oxygen.  Contact your healthcare provider if:  You have any of the following signs of a heart attack:  Squeezing, pressure, or pain in your chest  and any of the following:  Discomfort or pain in your back, neck, jaw, stomach, or arm  Shortness of breath  Nausea or vomiting  Lightheadedness or a sudden cold sweat  You have chest pain that is more frequent, or you have chest pain at rest.  You have questions or concerns about your condition or care.  Cholesterol medicines help lower blood cholesterol levels.  Nitrates , such as nitroglycerin, relax the arteries of your heart so it gets more oxygen. They help to relieve your chest pain.  Antiplatelets , such as aspirin, and plavix help prevent blood clots. Take your antiplatelet medicine exactly as directed. These medicines make it more likely for you to bleed or bruise. If you are told to take aspirin, do not take acetaminophen or ibuprofen instead.  Blood thinners help prevent blood clots. Examples of blood thinners include heparin and warfarin. Clots can cause strokes, heart attacks, and death. The following are general safety guidelines to follow while you are taking a blood thinner:  Watch for bleeding and bruising while you take blood thinners. Watch for bleeding from your gums or nose. Watch for blood in your urine and bowel movements. This can keep your skin and gums from bleeding. If you shave, use an electric shaver. Do not play contact sports.  Do not start or stop any medicines unless your healthcare provider tells you to.   Manage CAD:  Do not smoke. Nicotine and other chemicals in cigarettes and cigars can cause heart and lung damage.

## 2021-07-07 NOTE — DISCHARGE NOTE PROVIDER - CARE PROVIDERS DIRECT ADDRESSES
,DirectAddress_Unknown ,DirectAddress_Unknown,[email or web address] ,DirectAddress_Unknown,[email or web address]

## 2021-07-07 NOTE — DISCHARGE NOTE PROVIDER - CARE PROVIDER_API CALL
МАРИЯ STEWART  [id number] [place] [address]  Phone: ([phone]  Fax: ()-  Follow Up Time: 2 weeks   МАРИЯ STEWART  [id number]  [id number] [place] [address]  Phone: ()-  Fax: ()-  Follow Up Time: 2 weeks    Faith Alcala)  Gastroenterology; Internal Medicine  [address] [place] [address]  Phone: [phone]  Fax: [phone]  Follow Up Time: 2 weeks   МАРИЯ STEWART  [id number]  [id number] [place] [address]  Phone: ()-  Fax: ()-  Follow Up Time: 2 weeks    Michelle Nicolas)  Gastroenterology  [address] [place] [address]  Phone: [phone]  Fax: [phone]  Follow Up Time: 2 weeks

## 2021-07-07 NOTE — PROGRESS NOTE ADULT - SUBJECTIVE AND OBJECTIVE BOX
** This is an incomplete note - pending AM rounds**   MARCO A LAGUNA 80y Female  MRN#: [id number]   CODE STATUS:________    Hospital Day: 1d    Pt is currently admitted with the primary diagnosis of colitis and post-herpetic neuralgia    SUBJECTIVE  Hospital Course  : admitted with primary diagnosis of colitis; started on IV fluids and antibiotics - flagly and cipro;     Overnight events/Subjective complaints        Present Today:   - Gaines:  No [  ], Yes [   ] : Indication:     - Type of IV Access:       .. CVC/Piccline:  No [  ], Yes [   ] : Indication:       .. Midline: No [  ], Yes [   ] : Indication:                                             ----------------------------------------------------------  OBJECTIVE  PAST MEDICAL & SURGICAL HISTORY  HTN (hypertension)    HLD (hyperlipidemia)    CAD (coronary artery disease)    Cataract    History of surgery  cardiac stents x2    History of cholecystectomy    History of tonsillectomy    H/O hernia repair    H/O ovarian cystectomy                                              -----------------------------------------------------------  ALLERGIES:  No Known Allergies                                            ------------------------------------------------------------    HOME MEDICATIONS  Home Medications:  lisinopril 20 mg oral tablet: 1 tab(s) orally once a day (2021 08:56)  Plavix:  (2021 08:56)                           MEDICATIONS:  STANDING MEDICATIONS  atorvastatin 40 milliGRAM(s) Oral at bedtime  chlorhexidine 4% Liquid 1 Application(s) Topical <User Schedule>  ciprofloxacin   IVPB 400 milliGRAM(s) IV Intermittent every 12 hours  clopidogrel Tablet 75 milliGRAM(s) Oral daily  enoxaparin Injectable 40 milliGRAM(s) SubCutaneous daily  gabapentin 300 milliGRAM(s) Oral three times a day  lidocaine   Patch 1 Patch Transdermal every 24 hours  lisinopril 20 milliGRAM(s) Oral daily  metroNIDAZOLE    Tablet 500 milliGRAM(s) Oral every 8 hours  pantoprazole    Tablet 40 milliGRAM(s) Oral before breakfast  sodium chloride 0.9%. 1000 milliLiter(s) IV Continuous <Continuous>    PRN MEDICATIONS  morphine  - Injectable 2 milliGRAM(s) IV Push every 6 hours PRN  ondansetron Injectable 4 milliGRAM(s) IV Push every 8 hours PRN  oxycodone    5 mG/acetaminophen 325 mG 1 Tablet(s) Oral every 6 hours PRN                                            ------------------------------------------------------------  VITAL SIGNS: Last 24 Hours  T(C): 36.4 (2021 05:43), Max: 36.4 (2021 13:12)  T(F): 97.6 (2021 05:43), Max: 97.6 (2021 13:12)  HR: 88 (2021 05:43) (85 - 93)  BP: 112/56 (2021 05:43) (106/50 - 116/59)  BP(mean): --  RR: 18 (2021 05:43) (16 - 18)  SpO2: --                                             --------------------------------------------------------------  LABS:                        13.9   12.43 )-----------( 310      ( 2021 21:04 )             40.7         139  |  101  |  10  ----------------------------<  100<H>  4.0   |  26  |  0.8    Ca    9.8      2021 21:04    TPro  8.0  /  Alb  4.3  /  TBili  0.5  /  DBili  x   /  AST  22  /  ALT  20  /  AlkPhos  155<H>  07-05      Urinalysis Basic - ( 2021 20:45 )    Color: Light Yellow / Appearance: Clear / S.011 / pH: x  Gluc: x / Ketone: Trace  / Bili: Negative / Urobili: <2 mg/dL   Blood: x / Protein: Negative / Nitrite: Negative   Leuk Esterase: Negative / RBC: x / WBC x   Sq Epi: x / Non Sq Epi: x / Bacteria: x              Culture - Urine (collected 2021 20:45)  Source: .Urine Clean Catch (Midstream)  Final Report (2021 22:01):    <10,000 CFU/mL Normal Urogenital Glo                                                    -------------------------------------------------------------  RADIOLOGY:                                            --------------------------------------------------------------    PHYSICAL EXAM:  General:   HEENT:  LUNGS:  HEART:  ABDOMEN:  EXT:  NEURO:  SKIN:                                           -------------------------------------------------------------- ** This is an incomplete note - pending AM rounds**   MARCO A LAGUNA 80y Female  MRN#: [id number]   CODE STATUS:________    Hospital Day: 1d    Pt is currently admitted with the primary diagnosis of colitis and post-herpetic neuralgia    SUBJECTIVE  Hospital Course  : admitted with primary diagnosis of colitis; started on IV fluids and antibiotics - flagly and cipro;     Overnight events/Subjective complaints  Patient was seen at the bedside this morning. She is lying comfortably on the bed. She was admitted overnight with abdominal pain and diarrhea. The pain has improved today. She hasn't had any BM since yesterday.     She denies any chest pain, shortness of breath, cough, fever, chills, nausea, vomiting, diarrhea, dizziness or headache.       Present Today:   - Gaines:  No [ X ], Yes [   ] : Indication:     - Type of IV Access:       .. CVC/Piccline:  No [ X ], Yes [   ] : Indication:       .. Midline: No [ X ], Yes [   ] : Indication:                                             ----------------------------------------------------------  OBJECTIVE  PAST MEDICAL & SURGICAL HISTORY  HTN (hypertension)    HLD (hyperlipidemia)    CAD (coronary artery disease)    Cataract    History of surgery  cardiac stents x2    History of cholecystectomy    History of tonsillectomy    H/O hernia repair    H/O ovarian cystectomy                                              -----------------------------------------------------------  ALLERGIES:  No Known Allergies                                            ------------------------------------------------------------    HOME MEDICATIONS  Home Medications:  lisinopril 20 mg oral tablet: 1 tab(s) orally once a day (2021 08:56)  Plavix:  (2021 08:56)                           MEDICATIONS:  STANDING MEDICATIONS  atorvastatin 40 milliGRAM(s) Oral at bedtime  chlorhexidine 4% Liquid 1 Application(s) Topical <User Schedule>  ciprofloxacin   IVPB 400 milliGRAM(s) IV Intermittent every 12 hours  clopidogrel Tablet 75 milliGRAM(s) Oral daily  enoxaparin Injectable 40 milliGRAM(s) SubCutaneous daily  gabapentin 300 milliGRAM(s) Oral three times a day  lidocaine   Patch 1 Patch Transdermal every 24 hours  lisinopril 20 milliGRAM(s) Oral daily  metroNIDAZOLE    Tablet 500 milliGRAM(s) Oral every 8 hours  pantoprazole    Tablet 40 milliGRAM(s) Oral before breakfast  sodium chloride 0.9%. 1000 milliLiter(s) IV Continuous <Continuous>    PRN MEDICATIONS  morphine  - Injectable 2 milliGRAM(s) IV Push every 6 hours PRN  ondansetron Injectable 4 milliGRAM(s) IV Push every 8 hours PRN  oxycodone    5 mG/acetaminophen 325 mG 1 Tablet(s) Oral every 6 hours PRN                                            ------------------------------------------------------------  VITAL SIGNS: Last 24 Hours  T(C): 36.4 (2021 05:43), Max: 36.4 (2021 13:12)  T(F): 97.6 (2021 05:43), Max: 97.6 (2021 13:12)  HR: 88 (2021 05:43) (85 - 93)  BP: 112/56 (2021 05:43) (106/50 - 116/59)  BP(mean): --  RR: 18 (2021 05:43) (16 - 18)  SpO2: --                                             --------------------------------------------------------------  LABS:                        13.9   12.43 )-----------( 310      ( 2021 21:04 )             40.7     07-05    139  |  101  |  10  ----------------------------<  100<H>  4.0   |  26  |  0.8    Ca    9.8      2021 21:04    TPro  8.0  /  Alb  4.3  /  TBili  0.5  /  DBili  x   /  AST  22  /  ALT  20  /  AlkPhos  155<H>        Urinalysis Basic - ( 2021 20:45 )    Color: Light Yellow / Appearance: Clear / S.011 / pH: x  Gluc: x / Ketone: Trace  / Bili: Negative / Urobili: <2 mg/dL   Blood: x / Protein: Negative / Nitrite: Negative   Leuk Esterase: Negative / RBC: x / WBC x   Sq Epi: x / Non Sq Epi: x / Bacteria: x              Culture - Urine (collected 2021 20:45)  Source: .Urine Clean Catch (Midstream)  Final Report (2021 22:01):    <10,000 CFU/mL Normal Urogenital Glo                                                    -------------------------------------------------------------  RADIOLOGY:  CT abdomen and pelvis ():   Findings compatible with colitis involving transverse colon. Differential diagnoses includes infectious as well as inflammatory etiology.  No evidence of drainable fluid collection or pneumoperitoneum.  Dilated biliary system may be secondary to a cholecystectomy.                                            --------------------------------------------------------------    PHYSICAL EXAM:  General: No acute distress; Pallor (-), Icterus (-), Cyanosis (-), Clubbing (-)  HEENT: Normocephalic, atraumatic, PERRLA, EOMI  PULM: Bilaterally equal and clear breath sounds, wheeze (-), rubs (-), crackles (-)  CVS: Normal S1 and S2, murmurs (-), rubs (-), gallops (-)   GI: Soft, nondistended, nontender, BS +; midline scar on lower abdomen  MSK: Edema (-), no muscle, bone or joint tenderness noted  SKIN: Warm and well perfused, crusted lesions + over left lower back and flank  NEURO:  Alert and Oriented x 3; No gross focal neurological deficit noted                                             -------------------------------------------------------------- ** This is an incomplete note - pending AM rounds**   MARCO A LAGUNA 80y Female  MRN#: [id number]   CODE STATUS:Full    Hospital Day: 1d    Pt is currently admitted with the primary diagnosis of colitis and post-herpetic neuralgia    SUBJECTIVE  Hospital Course  : admitted with primary diagnosis of colitis; started on IV fluids and antibiotics - flagly and cipro;     Overnight events/Subjective complaints  Patient was seen at the bedside this morning. She is lying comfortably on the bed. She was admitted overnight with abdominal pain and diarrhea. The pain has improved today. She hasn't had any BM since yesterday.     She denies any chest pain, shortness of breath, cough, fever, chills, nausea, vomiting, diarrhea, dizziness or headache.       Present Today:   - Gaines:  No [ X ], Yes [   ] : Indication:     - Type of IV Access:       .. CVC/Piccline:  No [ X ], Yes [   ] : Indication:       .. Midline: No [ X ], Yes [   ] : Indication:                                             ----------------------------------------------------------  OBJECTIVE  PAST MEDICAL & SURGICAL HISTORY  HTN (hypertension)    HLD (hyperlipidemia)    CAD (coronary artery disease)    Cataract    History of surgery  cardiac stents x2    History of cholecystectomy    History of tonsillectomy    H/O hernia repair    H/O ovarian cystectomy                                              -----------------------------------------------------------  ALLERGIES:  No Known Allergies                                            ------------------------------------------------------------    HOME MEDICATIONS  Home Medications:  lisinopril 20 mg oral tablet: 1 tab(s) orally once a day (2021 08:56)  Plavix:  (2021 08:56)                           MEDICATIONS:  STANDING MEDICATIONS  atorvastatin 40 milliGRAM(s) Oral at bedtime  chlorhexidine 4% Liquid 1 Application(s) Topical <User Schedule>  ciprofloxacin   IVPB 400 milliGRAM(s) IV Intermittent every 12 hours  clopidogrel Tablet 75 milliGRAM(s) Oral daily  enoxaparin Injectable 40 milliGRAM(s) SubCutaneous daily  gabapentin 300 milliGRAM(s) Oral three times a day  lidocaine   Patch 1 Patch Transdermal every 24 hours  lisinopril 20 milliGRAM(s) Oral daily  metroNIDAZOLE    Tablet 500 milliGRAM(s) Oral every 8 hours  pantoprazole    Tablet 40 milliGRAM(s) Oral before breakfast  sodium chloride 0.9%. 1000 milliLiter(s) IV Continuous <Continuous>    PRN MEDICATIONS  morphine  - Injectable 2 milliGRAM(s) IV Push every 6 hours PRN  ondansetron Injectable 4 milliGRAM(s) IV Push every 8 hours PRN  oxycodone    5 mG/acetaminophen 325 mG 1 Tablet(s) Oral every 6 hours PRN                                            ------------------------------------------------------------  VITAL SIGNS: Last 24 Hours  T(C): 36.4 (2021 05:43), Max: 36.4 (2021 13:12)  T(F): 97.6 (2021 05:43), Max: 97.6 (2021 13:12)  HR: 88 (2021 05:43) (85 - 93)  BP: 112/56 (2021 05:43) (106/50 - 116/59)  BP(mean): --  RR: 18 (2021 05:43) (16 - 18)  SpO2: --                                             --------------------------------------------------------------  LABS:                        13.9   12.43 )-----------( 310      ( 2021 21:04 )             40.7     07-05    139  |  101  |  10  ----------------------------<  100<H>  4.0   |  26  |  0.8    Ca    9.8      2021 21:04    TPro  8.0  /  Alb  4.3  /  TBili  0.5  /  DBili  x   /  AST  22  /  ALT  20  /  AlkPhos  155<H>        Urinalysis Basic - ( 2021 20:45 )    Color: Light Yellow / Appearance: Clear / S.011 / pH: x  Gluc: x / Ketone: Trace  / Bili: Negative / Urobili: <2 mg/dL   Blood: x / Protein: Negative / Nitrite: Negative   Leuk Esterase: Negative / RBC: x / WBC x   Sq Epi: x / Non Sq Epi: x / Bacteria: x              Culture - Urine (collected 2021 20:45)  Source: .Urine Clean Catch (Midstream)  Final Report (2021 22:01):    <10,000 CFU/mL Normal Urogenital Glo                                                    -------------------------------------------------------------  RADIOLOGY:  CT abdomen and pelvis ():   Findings compatible with colitis involving transverse colon. Differential diagnoses includes infectious as well as inflammatory etiology.  No evidence of drainable fluid collection or pneumoperitoneum.  Dilated biliary system may be secondary to a cholecystectomy.                                            --------------------------------------------------------------    PHYSICAL EXAM:  General: No acute distress; Pallor (-), Icterus (-), Cyanosis (-), Clubbing (-)  HEENT: Normocephalic, atraumatic, PERRLA, EOMI  PULM: Bilaterally equal and clear breath sounds, wheeze (-), rubs (-), crackles (-)  CVS: Normal S1 and S2, murmurs (-), rubs (-), gallops (-)   GI: Soft, nondistended, nontender, BS +; midline scar on lower abdomen  MSK: Edema (-), no muscle, bone or joint tenderness noted  SKIN: Warm and well perfused, crusted lesions + over left lower back and flank  NEURO:  Alert and Oriented x 3; No gross focal neurological deficit noted                                             -------------------------------------------------------------- MARCO A LAGUNA 80y Female  MRN#: [id number]   CODE STATUS: Full    Hospital Day: 1d    Pt is currently admitted with the primary diagnosis of colitis and post-herpetic neuralgia    SUBJECTIVE  Hospital Course  : admitted with primary diagnosis of colitis; started on IV fluids and antibiotics - flagly and cipro;     Overnight events/Subjective complaints  Patient was seen at the bedside this morning. She is lying comfortably on the bed. She was admitted overnight with abdominal pain and diarrhea and hematochezia. The pain has improved today. She hasn't had any BM since yesterday.   She denies any chest pain, shortness of breath, cough, fever, chills, nausea, vomiting, dizziness or headache.       Present Today:   - Gaines:  No [ X ], Yes [   ] : Indication:     - Type of IV Access:       .. CVC/Piccline:  No [ X ], Yes [   ] : Indication:       .. Midline: No [ X ], Yes [   ] : Indication:                                             ----------------------------------------------------------  OBJECTIVE  PAST MEDICAL & SURGICAL HISTORY  HTN (hypertension)    HLD (hyperlipidemia)    CAD (coronary artery disease)    Cataract    History of surgery  cardiac stents x2    History of cholecystectomy    History of tonsillectomy    H/O hernia repair    H/O ovarian cystectomy                                              -----------------------------------------------------------  ALLERGIES:  No Known Allergies                                            ------------------------------------------------------------    HOME MEDICATIONS  Home Medications:  lisinopril 20 mg oral tablet: 1 tab(s) orally once a day (2021 08:56)  Plavix:  (2021 08:56)                           MEDICATIONS:  STANDING MEDICATIONS  atorvastatin 40 milliGRAM(s) Oral at bedtime  chlorhexidine 4% Liquid 1 Application(s) Topical <User Schedule>  ciprofloxacin   IVPB 400 milliGRAM(s) IV Intermittent every 12 hours  clopidogrel Tablet 75 milliGRAM(s) Oral daily  enoxaparin Injectable 40 milliGRAM(s) SubCutaneous daily  gabapentin 300 milliGRAM(s) Oral three times a day  lidocaine   Patch 1 Patch Transdermal every 24 hours  lisinopril 20 milliGRAM(s) Oral daily  metroNIDAZOLE    Tablet 500 milliGRAM(s) Oral every 8 hours  pantoprazole    Tablet 40 milliGRAM(s) Oral before breakfast  sodium chloride 0.9%. 1000 milliLiter(s) IV Continuous <Continuous>    PRN MEDICATIONS  morphine  - Injectable 2 milliGRAM(s) IV Push every 6 hours PRN  ondansetron Injectable 4 milliGRAM(s) IV Push every 8 hours PRN  oxycodone    5 mG/acetaminophen 325 mG 1 Tablet(s) Oral every 6 hours PRN                                            ------------------------------------------------------------  VITAL SIGNS: Last 24 Hours  T(C): 36.4 (2021 05:43), Max: 36.4 (2021 13:12)  T(F): 97.6 (2021 05:43), Max: 97.6 (2021 13:12)  HR: 88 (2021 05:43) (85 - 93)  BP: 112/56 (2021 05:43) (106/50 - 116/59)  BP(mean): --  RR: 18 (2021 05:43) (16 - 18)  SpO2: --                                             --------------------------------------------------------------  LABS:                        13.9   12.43 )-----------( 310      ( 2021 21:04 )             40.7     07-05    139  |  101  |  10  ----------------------------<  100<H>  4.0   |  26  |  0.8    Ca    9.8      2021 21:04    TPro  8.0  /  Alb  4.3  /  TBili  0.5  /  DBili  x   /  AST  22  /  ALT  20  /  AlkPhos  155<H>  07-05      Urinalysis Basic - ( 2021 20:45 )    Color: Light Yellow / Appearance: Clear / S.011 / pH: x  Gluc: x / Ketone: Trace  / Bili: Negative / Urobili: <2 mg/dL   Blood: x / Protein: Negative / Nitrite: Negative   Leuk Esterase: Negative / RBC: x / WBC x   Sq Epi: x / Non Sq Epi: x / Bacteria: x              Culture - Urine (collected 2021 20:45)  Source: .Urine Clean Catch (Midstream)  Final Report (2021 22:01):    <10,000 CFU/mL Normal Urogenital Glo                                                    -------------------------------------------------------------  RADIOLOGY:  CT abdomen and pelvis ():   Findings compatible with colitis involving transverse colon. Differential diagnoses includes infectious as well as inflammatory etiology.  No evidence of drainable fluid collection or pneumoperitoneum.  Dilated biliary system may be secondary to a cholecystectomy.                                            --------------------------------------------------------------    PHYSICAL EXAM:  General: No acute distress; Pallor (-), Icterus (-), Cyanosis (-), Clubbing (-)  HEENT: Normocephalic, atraumatic, PERRLA, EOMI  PULM: Bilaterally equal and clear breath sounds, wheeze (-), rubs (-), crackles (-)  CVS: Normal S1 and S2, murmurs (-), rubs (-), gallops (-)   GI: Soft, nondistended, nontender, BS +; midline scar on lower abdomen  MSK: Edema (-), no muscle, bone or joint tenderness noted  SKIN: Warm and well perfused, crusted lesions + over left lower back and flank  NEURO:  Alert and Oriented x 3; No gross focal neurological deficit noted                                             --------------------------------------------------------------

## 2021-07-07 NOTE — DISCHARGE NOTE PROVIDER - HOSPITAL COURSE
81 yo female patient with PMH of shingles, diverticulitis, HTN, CAD presents for lower abdominal pain for 1 day, acute onset, located on lower abdomen, sharp, moderate to intense in severity, non radiating with no alleviating or exacerbating factors, similar in nature for the episode of diverticulitis she had 20 years ago associated with loose bowel movements tinged with blood. She also mentions decrease in appetite with mild nausea but no episodes of vomiting. To note that she was diagnosed with zoster 3 weeks ago for which she was prescribed valtrex that she finished but she is still complaining of pain in her left upper back and anterior ribs region. She denied recent antibiotic use, fevers, chills, chest pain, shortness of breath, urinary or other associated symptoms.    In the ED VS were within normal limits. CT a/p showed evidence of colitis in the transverse colon. She was given IV fluids and admitted to medicine for management of colitis. She was treated with IV ciprofloxacin and IV flagyl. She was initially kept NPO, diet was gradually advanced. She has been free of abdominal pain, nausea, vomiting, fever or diarrhea.   Patient is stable for discharge. She is being discharged on PO antibiotics and probiotics. She will need outpatient follow up with PCP and Gastroenterology - for colonoscopy after resolution of colitis.

## 2021-07-08 ENCOUNTER — TRANSCRIPTION ENCOUNTER (OUTPATIENT)
Age: 81
End: 2021-07-08

## 2021-07-08 VITALS
TEMPERATURE: 98 F | RESPIRATION RATE: 18 BRPM | HEART RATE: 88 BPM | DIASTOLIC BLOOD PRESSURE: 63 MMHG | SYSTOLIC BLOOD PRESSURE: 108 MMHG

## 2021-07-08 LAB
ALBUMIN SERPL ELPH-MCNC: 3.3 G/DL — LOW (ref 3.5–5.2)
ALP SERPL-CCNC: 95 U/L — SIGNIFICANT CHANGE UP (ref 30–115)
ALT FLD-CCNC: 10 U/L — SIGNIFICANT CHANGE UP (ref 0–41)
ANION GAP SERPL CALC-SCNC: 8 MMOL/L — SIGNIFICANT CHANGE UP (ref 7–14)
AST SERPL-CCNC: 15 U/L — SIGNIFICANT CHANGE UP (ref 0–41)
BASOPHILS # BLD AUTO: 0.03 K/UL — SIGNIFICANT CHANGE UP (ref 0–0.2)
BASOPHILS NFR BLD AUTO: 0.3 % — SIGNIFICANT CHANGE UP (ref 0–1)
BILIRUB SERPL-MCNC: 0.3 MG/DL — SIGNIFICANT CHANGE UP (ref 0.2–1.2)
BUN SERPL-MCNC: 9 MG/DL — LOW (ref 10–20)
CALCIUM SERPL-MCNC: 8.4 MG/DL — LOW (ref 8.5–10.1)
CHLORIDE SERPL-SCNC: 105 MMOL/L — SIGNIFICANT CHANGE UP (ref 98–110)
CO2 SERPL-SCNC: 23 MMOL/L — SIGNIFICANT CHANGE UP (ref 17–32)
CREAT SERPL-MCNC: 0.7 MG/DL — SIGNIFICANT CHANGE UP (ref 0.7–1.5)
EOSINOPHIL # BLD AUTO: 0.32 K/UL — SIGNIFICANT CHANGE UP (ref 0–0.7)
EOSINOPHIL NFR BLD AUTO: 3.5 % — SIGNIFICANT CHANGE UP (ref 0–8)
GLUCOSE SERPL-MCNC: 107 MG/DL — HIGH (ref 70–99)
HCT VFR BLD CALC: 31.5 % — LOW (ref 37–47)
HGB BLD-MCNC: 10.1 G/DL — LOW (ref 12–16)
IMM GRANULOCYTES NFR BLD AUTO: 0.3 % — SIGNIFICANT CHANGE UP (ref 0.1–0.3)
LYMPHOCYTES # BLD AUTO: 2.31 K/UL — SIGNIFICANT CHANGE UP (ref 1.2–3.4)
LYMPHOCYTES # BLD AUTO: 25.5 % — SIGNIFICANT CHANGE UP (ref 20.5–51.1)
MAGNESIUM SERPL-MCNC: 1.7 MG/DL — LOW (ref 1.8–2.4)
MCHC RBC-ENTMCNC: 29.7 PG — SIGNIFICANT CHANGE UP (ref 27–31)
MCHC RBC-ENTMCNC: 32.1 G/DL — SIGNIFICANT CHANGE UP (ref 32–37)
MCV RBC AUTO: 92.6 FL — SIGNIFICANT CHANGE UP (ref 81–99)
MONOCYTES # BLD AUTO: 1.05 K/UL — HIGH (ref 0.1–0.6)
MONOCYTES NFR BLD AUTO: 11.6 % — HIGH (ref 1.7–9.3)
NEUTROPHILS # BLD AUTO: 5.33 K/UL — SIGNIFICANT CHANGE UP (ref 1.4–6.5)
NEUTROPHILS NFR BLD AUTO: 58.8 % — SIGNIFICANT CHANGE UP (ref 42.2–75.2)
NRBC # BLD: 0 /100 WBCS — SIGNIFICANT CHANGE UP (ref 0–0)
PLATELET # BLD AUTO: 238 K/UL — SIGNIFICANT CHANGE UP (ref 130–400)
POTASSIUM SERPL-MCNC: 3.7 MMOL/L — SIGNIFICANT CHANGE UP (ref 3.5–5)
POTASSIUM SERPL-SCNC: 3.7 MMOL/L — SIGNIFICANT CHANGE UP (ref 3.5–5)
PROT SERPL-MCNC: 5.6 G/DL — LOW (ref 6–8)
RBC # BLD: 3.4 M/UL — LOW (ref 4.2–5.4)
RBC # FLD: 14.6 % — HIGH (ref 11.5–14.5)
SODIUM SERPL-SCNC: 136 MMOL/L — SIGNIFICANT CHANGE UP (ref 135–146)
WBC # BLD: 9.07 K/UL — SIGNIFICANT CHANGE UP (ref 4.8–10.8)
WBC # FLD AUTO: 9.07 K/UL — SIGNIFICANT CHANGE UP (ref 4.8–10.8)

## 2021-07-08 PROCEDURE — 99232 SBSQ HOSP IP/OBS MODERATE 35: CPT

## 2021-07-08 RX ORDER — MOXIFLOXACIN HYDROCHLORIDE TABLETS, 400 MG 400 MG/1
1 TABLET, FILM COATED ORAL
Qty: 24 | Refills: 0
Start: 2021-07-08 | End: 2021-07-19

## 2021-07-08 RX ORDER — MAGNESIUM SULFATE 500 MG/ML
2 VIAL (ML) INJECTION ONCE
Refills: 0 | Status: COMPLETED | OUTPATIENT
Start: 2021-07-08 | End: 2021-07-08

## 2021-07-08 RX ORDER — LISINOPRIL 2.5 MG/1
1 TABLET ORAL
Qty: 0 | Refills: 0 | DISCHARGE

## 2021-07-08 RX ORDER — LIDOCAINE 4 G/100G
1 CREAM TOPICAL ONCE
Refills: 0 | Status: COMPLETED | OUTPATIENT
Start: 2021-07-08 | End: 2021-07-08

## 2021-07-08 RX ORDER — ATORVASTATIN CALCIUM 80 MG/1
1 TABLET, FILM COATED ORAL
Qty: 30 | Refills: 0
Start: 2021-07-08 | End: 2021-08-06

## 2021-07-08 RX ORDER — SACCHAROMYCES BOULARDII 250 MG
1 POWDER IN PACKET (EA) ORAL
Qty: 60 | Refills: 0
Start: 2021-07-08 | End: 2021-08-06

## 2021-07-08 RX ORDER — SENNA PLUS 8.6 MG/1
2 TABLET ORAL
Qty: 60 | Refills: 0
Start: 2021-07-08 | End: 2021-08-06

## 2021-07-08 RX ORDER — GABAPENTIN 400 MG/1
1 CAPSULE ORAL
Qty: 14 | Refills: 0
Start: 2021-07-08 | End: 2021-07-14

## 2021-07-08 RX ORDER — PANTOPRAZOLE SODIUM 20 MG/1
1 TABLET, DELAYED RELEASE ORAL
Qty: 30 | Refills: 0
Start: 2021-07-08 | End: 2021-08-06

## 2021-07-08 RX ORDER — CLOPIDOGREL BISULFATE 75 MG/1
1 TABLET, FILM COATED ORAL
Qty: 30 | Refills: 0
Start: 2021-07-08

## 2021-07-08 RX ORDER — METRONIDAZOLE 500 MG
1 TABLET ORAL
Qty: 36 | Refills: 0
Start: 2021-07-08 | End: 2021-07-19

## 2021-07-08 RX ORDER — CLOPIDOGREL BISULFATE 75 MG/1
1 TABLET, FILM COATED ORAL
Qty: 0 | Refills: 0 | DISCHARGE

## 2021-07-08 RX ORDER — MAGNESIUM SULFATE 500 MG/ML
2 VIAL (ML) INJECTION ONCE
Refills: 0 | Status: DISCONTINUED | OUTPATIENT
Start: 2021-07-08 | End: 2021-07-08

## 2021-07-08 RX ADMIN — LIDOCAINE 1 APPLICATION(S): 4 CREAM TOPICAL at 06:01

## 2021-07-08 RX ADMIN — LIDOCAINE 1 PATCH: 4 CREAM TOPICAL at 16:31

## 2021-07-08 RX ADMIN — Medication 200 MILLIGRAM(S): at 06:00

## 2021-07-08 RX ADMIN — PANTOPRAZOLE SODIUM 40 MILLIGRAM(S): 20 TABLET, DELAYED RELEASE ORAL at 06:01

## 2021-07-08 RX ADMIN — Medication 25 GRAM(S): at 09:35

## 2021-07-08 RX ADMIN — Medication 250 MILLIGRAM(S): at 17:20

## 2021-07-08 RX ADMIN — Medication 500 MILLIGRAM(S): at 14:10

## 2021-07-08 RX ADMIN — Medication 200 MILLIGRAM(S): at 17:20

## 2021-07-08 RX ADMIN — CLOPIDOGREL BISULFATE 75 MILLIGRAM(S): 75 TABLET, FILM COATED ORAL at 11:08

## 2021-07-08 RX ADMIN — CHLORHEXIDINE GLUCONATE 1 APPLICATION(S): 213 SOLUTION TOPICAL at 06:02

## 2021-07-08 RX ADMIN — Medication 10 MILLIGRAM(S): at 11:08

## 2021-07-08 RX ADMIN — Medication 250 MILLIGRAM(S): at 06:00

## 2021-07-08 RX ADMIN — Medication 10 MILLIGRAM(S): at 16:31

## 2021-07-08 RX ADMIN — SODIUM CHLORIDE 60 MILLILITER(S): 9 INJECTION INTRAMUSCULAR; INTRAVENOUS; SUBCUTANEOUS at 06:00

## 2021-07-08 RX ADMIN — ENOXAPARIN SODIUM 40 MILLIGRAM(S): 100 INJECTION SUBCUTANEOUS at 11:09

## 2021-07-08 RX ADMIN — Medication 500 MILLIGRAM(S): at 06:01

## 2021-07-08 RX ADMIN — Medication 10 MILLIGRAM(S): at 06:01

## 2021-07-08 RX ADMIN — LISINOPRIL 20 MILLIGRAM(S): 2.5 TABLET ORAL at 06:01

## 2021-07-08 RX ADMIN — GABAPENTIN 300 MILLIGRAM(S): 400 CAPSULE ORAL at 06:01

## 2021-07-08 RX ADMIN — GABAPENTIN 300 MILLIGRAM(S): 400 CAPSULE ORAL at 14:10

## 2021-07-08 NOTE — PROGRESS NOTE ADULT - ATTENDING COMMENTS
Pt was seen and examined at bedside independently, pt feels much better, denies abdominal pain today, tolerated diet.   I agree with medical resident's findings, assessment and plan above, pt is stable for discharge home with GI follow up for repeat colonoscopy in 6-8 weeks from now, will c/o po Abx to complete 14 days course.   Pt was consulted regarding diet, medications compliance and OP follow up.
Pt was seen and examined at bedside independently, pt is c/o abdominal cramps, constipated for 3 days. She was admitted for colitis, last colonoscopy was 10 years ago. She is clinically improving in last 24 hours, today will advance diet, start Probiotics, Bentyl, Senna for constipation, if pt'll tolerate diet anticipate discharge in 24 hours.   I agree with medical resident's findings, assessment and plan above.  Case d/w with pt's daughter today at length, pt needs colonoscopy in 6-8 weeks from now.   For now will c/w current medical management, d/c contact isolation, PT/rehab eval.     #Progress Note Handoff  Pending (specify):  start Probiotics, Bentyl, Senna, advance diet as tolerated, anticipate discharge in 24 hours   Family discussion: I spoke with daughter at length, the plan of care discussed   Disposition: Home_x __/SNF___/Other________/Unknown at this time________

## 2021-07-08 NOTE — PROGRESS NOTE ADULT - SUBJECTIVE AND OBJECTIVE BOX
** This is an incomplete note - pending AM rounds**   MARCO A LAGUNA 80y Female  MRN#: [id number]   CODE STATUS: Full    Hospital Day: 2d    Pt is currently admitted with the primary diagnosis of transverse colitis    SUBJECTIVE  Hospital Course  07/06: admitted with primary diagnosis of colitis; started on IV fluids and antibiotics - flagly and cipro;     Overnight events/Subjective complaints        Present Today:   - Gaines:  No [ X ], Yes [   ] : Indication:     - Type of IV Access:       .. CVC/Piccline:  No [ X ], Yes [   ] : Indication:       .. Midline: No [ X ], Yes [   ] : Indication:                                             ----------------------------------------------------------  OBJECTIVE  PAST MEDICAL & SURGICAL HISTORY  HTN (hypertension)    HLD (hyperlipidemia)    CAD (coronary artery disease)    Cataract    History of surgery  cardiac stents x2    History of cholecystectomy    History of tonsillectomy    H/O hernia repair    H/O ovarian cystectomy                                              -----------------------------------------------------------  ALLERGIES:  No Known Allergies                                            ------------------------------------------------------------    HOME MEDICATIONS  Home Medications:  Cipro 500 mg oral tablet: 1 tab(s) orally every 12 hours (07 Jul 2021 16:05)  dicyclomine 10 mg oral capsule: 1 cap(s) orally 3 times a day (before meals) for 5 days (07 Jul 2021 16:05)  lisinopril 20 mg oral tablet: 1 tab(s) orally once a day (06 Jul 2021 08:56)  metroNIDAZOLE 500 mg oral tablet: 1 tab(s) orally every 8 hours (07 Jul 2021 16:05)  Plavix 75 mg oral tablet: 1 tab(s) orally once a day (07 Jul 2021 16:07)  saccharomyces boulardii lyo 250 mg oral capsule: 1 cap(s) orally 2 times a day (07 Jul 2021 16:06)                           MEDICATIONS:  STANDING MEDICATIONS  atorvastatin 40 milliGRAM(s) Oral at bedtime  chlorhexidine 4% Liquid 1 Application(s) Topical <User Schedule>  ciprofloxacin   IVPB 400 milliGRAM(s) IV Intermittent every 12 hours  clopidogrel Tablet 75 milliGRAM(s) Oral daily  dicyclomine 10 milliGRAM(s) Oral three times a day before meals  enoxaparin Injectable 40 milliGRAM(s) SubCutaneous daily  gabapentin 300 milliGRAM(s) Oral three times a day  lidocaine   Patch 1 Patch Transdermal every 24 hours  lisinopril 20 milliGRAM(s) Oral daily  metroNIDAZOLE    Tablet 500 milliGRAM(s) Oral every 8 hours  pantoprazole    Tablet 40 milliGRAM(s) Oral before breakfast  saccharomyces boulardii 250 milliGRAM(s) Oral two times a day  senna 2 Tablet(s) Oral at bedtime  sodium chloride 0.9%. 1000 milliLiter(s) IV Continuous <Continuous>    PRN MEDICATIONS  morphine  - Injectable 2 milliGRAM(s) IV Push every 6 hours PRN  ondansetron Injectable 4 milliGRAM(s) IV Push every 8 hours PRN  oxycodone    5 mG/acetaminophen 325 mG 1 Tablet(s) Oral every 6 hours PRN                                            ------------------------------------------------------------  VITAL SIGNS: Last 24 Hours  T(C): 36.9 (08 Jul 2021 04:33), Max: 36.9 (08 Jul 2021 04:33)  T(F): 98.5 (08 Jul 2021 04:33), Max: 98.5 (08 Jul 2021 04:33)  HR: 89 (08 Jul 2021 04:33) (89 - 97)  BP: 111/51 (08 Jul 2021 04:33) (81/46 - 111/51)  BP(mean): --  RR: 18 (08 Jul 2021 04:33) (18 - 18)  SpO2: --      07-07-21 @ 07:01  -  07-08-21 @ 06:16  --------------------------------------------------------  IN: 920 mL / OUT: 0 mL / NET: 920 mL                                             --------------------------------------------------------------  LABS:                        10.8   8.07  )-----------( 245      ( 07 Jul 2021 07:31 )             32.4     07-07    139  |  105  |  5<L>  ----------------------------<  98  3.6   |  24  |  0.5<L>    Ca    8.9      07 Jul 2021 07:31  Mg     1.8     07-07    TPro  5.9<L>  /  Alb  3.2<L>  /  TBili  0.5  /  DBili  x   /  AST  17  /  ALT  12  /  AlkPhos  102  07-07                Culture - Urine (collected 05 Jul 2021 20:45)  Source: .Urine Clean Catch (Midstream)  Final Report (06 Jul 2021 22:01):    <10,000 CFU/mL Normal Urogenital Glo                                                    -------------------------------------------------------------  RADIOLOGY:                                            --------------------------------------------------------------    PHYSICAL EXAM:  General:   HEENT:  LUNGS:  HEART:  ABDOMEN:  EXT:  NEURO:  SKIN:                                           -------------------------------------------------------------- MARCO A LAGUNA 80y Female  MRN#: [id number]   CODE STATUS: Full    Hospital Day: 2d    Pt is currently admitted with the primary diagnosis of transverse colitis    SUBJECTIVE  Hospital Course  07/06: admitted with primary diagnosis of colitis; started on IV fluids and antibiotics - flagly and cipro;   07/07: Abdominal pain has almost resolved, no fever overnight, she is tolerating oral diet  Overnight events/Subjective complaints  Patient was seen at the bedside this morning. She is lying comfortably on the bed. There were no acute events overnight.   She denies any chest pain, shortness of breath, cough, fever, chills, abdominal pain, nausea, vomiting, diarrhea, dizziness or headache.       Present Today:   - Gaines:  No [ X ], Yes [   ] : Indication:     - Type of IV Access:       .. CVC/Piccline:  No [ X ], Yes [   ] : Indication:       .. Midline: No [ X ], Yes [   ] : Indication:                                             ----------------------------------------------------------  OBJECTIVE  PAST MEDICAL & SURGICAL HISTORY  HTN (hypertension)    HLD (hyperlipidemia)    CAD (coronary artery disease)    Cataract    History of surgery  cardiac stents x2    History of cholecystectomy    History of tonsillectomy    H/O hernia repair    H/O ovarian cystectomy                                              -----------------------------------------------------------  ALLERGIES:  No Known Allergies                                            ------------------------------------------------------------    HOME MEDICATIONS  Home Medications:  Cipro 500 mg oral tablet: 1 tab(s) orally every 12 hours (07 Jul 2021 16:05)  dicyclomine 10 mg oral capsule: 1 cap(s) orally 3 times a day (before meals) for 5 days (07 Jul 2021 16:05)  lisinopril 20 mg oral tablet: 1 tab(s) orally once a day (06 Jul 2021 08:56)  metroNIDAZOLE 500 mg oral tablet: 1 tab(s) orally every 8 hours (07 Jul 2021 16:05)  Plavix 75 mg oral tablet: 1 tab(s) orally once a day (07 Jul 2021 16:07)  saccharomyces boulardii lyo 250 mg oral capsule: 1 cap(s) orally 2 times a day (07 Jul 2021 16:06)                           MEDICATIONS:  STANDING MEDICATIONS  atorvastatin 40 milliGRAM(s) Oral at bedtime  chlorhexidine 4% Liquid 1 Application(s) Topical <User Schedule>  ciprofloxacin   IVPB 400 milliGRAM(s) IV Intermittent every 12 hours  clopidogrel Tablet 75 milliGRAM(s) Oral daily  dicyclomine 10 milliGRAM(s) Oral three times a day before meals  enoxaparin Injectable 40 milliGRAM(s) SubCutaneous daily  gabapentin 300 milliGRAM(s) Oral three times a day  lidocaine   Patch 1 Patch Transdermal every 24 hours  lisinopril 20 milliGRAM(s) Oral daily  metroNIDAZOLE    Tablet 500 milliGRAM(s) Oral every 8 hours  pantoprazole    Tablet 40 milliGRAM(s) Oral before breakfast  saccharomyces boulardii 250 milliGRAM(s) Oral two times a day  senna 2 Tablet(s) Oral at bedtime  sodium chloride 0.9%. 1000 milliLiter(s) IV Continuous <Continuous>    PRN MEDICATIONS  morphine  - Injectable 2 milliGRAM(s) IV Push every 6 hours PRN  ondansetron Injectable 4 milliGRAM(s) IV Push every 8 hours PRN  oxycodone    5 mG/acetaminophen 325 mG 1 Tablet(s) Oral every 6 hours PRN                                            ------------------------------------------------------------  VITAL SIGNS: Last 24 Hours  T(C): 36.9 (08 Jul 2021 04:33), Max: 36.9 (08 Jul 2021 04:33)  T(F): 98.5 (08 Jul 2021 04:33), Max: 98.5 (08 Jul 2021 04:33)  HR: 89 (08 Jul 2021 04:33) (89 - 97)  BP: 111/51 (08 Jul 2021 04:33) (81/46 - 111/51)  BP(mean): --  RR: 18 (08 Jul 2021 04:33) (18 - 18)  SpO2: --      07-07-21 @ 07:01  -  07-08-21 @ 06:16  --------------------------------------------------------  IN: 920 mL / OUT: 0 mL / NET: 920 mL                                             --------------------------------------------------------------  LABS:                        10.8   8.07  )-----------( 245      ( 07 Jul 2021 07:31 )             32.4     07-07    139  |  105  |  5<L>  ----------------------------<  98  3.6   |  24  |  0.5<L>    Ca    8.9      07 Jul 2021 07:31  Mg     1.8     07-07    TPro  5.9<L>  /  Alb  3.2<L>  /  TBili  0.5  /  DBili  x   /  AST  17  /  ALT  12  /  AlkPhos  102  07-07      Culture - Urine (collected 05 Jul 2021 20:45)  Source: .Urine Clean Catch (Midstream)  Final Report (06 Jul 2021 22:01):    <10,000 CFU/mL Normal Urogenital Glo                                                    -------------------------------------------------------------  RADIOLOGY:  CT abdomen and pelvis (07/06):   Findings compatible with colitis involving transverse colon. Differential diagnoses includes infectious as well as inflammatory etiology.  No evidence of drainable fluid collection or pneumoperitoneum.  Dilated biliary system may be secondary to a cholecystectomy.                                          --------------------------------------------------------------    PHYSICAL EXAM:  General: No acute distress; Pallor (-), Icterus (-), Cyanosis (-), Clubbing (-)  HEENT: Normocephalic, atraumatic, PERRLA, EOMI  PULM: Bilaterally equal and clear breath sounds, wheeze (-), rubs (-), crackles (-)  CVS: Normal S1 and S2, murmurs (-), rubs (-), gallops (-)   GI: Soft, nondistended, nontender, BS +; midline scar on lower abdomen  MSK: Edema (-), no muscle, bone or joint tenderness noted  SKIN: Warm and well perfused, crusted lesions + over left lower back and flank  NEURO:  Alert and Oriented x 3; No gross focal neurological deficit noted                                         --------------------------------------------------------------

## 2021-07-08 NOTE — PROGRESS NOTE ADULT - ASSESSMENT
81 yo female patient with PMH of shingles, diverticulitis, HTN, CAD presents for lower abdominal pain, nausea and diarrhea and hematochezia. CT a/p showed evidence of colitis in the transverse colon.    # Colitis of the transverse colon  - Not in sepsis on admission  - No recent antibiotic use  - lactate 1.1, less likely ischemic or IBD  - GI PCR, C diff toxins  - c/w cipro and flagyl  - IVF hydration  - pain control  - symptomatic treatment  - low fiber diet  - Probiotics  - outpatient GI follow up for Colonoscopy after resolution of colitis    # h/o shingles  - finished valtrex course  - Still complaining of pain, likely secondary to post herpetic neuralgia  - c/w gabapentin, lidocaine patch for flank pain    # HTN  - c/w lisinopril  - Monitor BP    # CAD s/p stent  - c/w Plavix and statins    # DVT PPX: lovenox  # GI PPX: pantoprazole  # DIET: DASH  # ACTIVITY: IAT  # DISPO: Home 79 yo female patient with PMH of shingles, diverticulitis, HTN, CAD presents for lower abdominal pain, nausea and diarrhea and hematochezia. CT a/p showed evidence of colitis in the transverse colon.    # Colitis of the transverse colon  - Not in sepsis on admission  - No recent antibiotic use  - lactate 1.1, less likely ischemic or IBD  - GI PCR, C diff toxins  - c/w cipro and flagyl - switch to PO on discharge  - IVF hydration  - pain control  - symptomatic treatment  - low fiber diet  - Probiotics  - outpatient GI follow up for Colonoscopy after resolution of colitis    # h/o shingles  - finished valtrex course  - Still complaining of pain, likely secondary to post herpetic neuralgia  - c/w gabapentin, lidocaine patch for flank pain    # HTN  - c/w lisinopril  - Monitor BP    # CAD s/p stent  - c/w Plavix and statins    # DVT PPX: lovenox  # GI PPX: pantoprazole  # DIET: DASH  # ACTIVITY: IAT  # DISPO: Home

## 2021-07-08 NOTE — DISCHARGE NOTE NURSING/CASE MANAGEMENT/SOCIAL WORK - PATIENT PORTAL LINK FT
You can access the FollowMyHealth Patient Portal offered by [facility] by registering at the following website: [email or web address]. By joining [employer] [facility]’s FollowMyHealth portal, you will also be able to view your health information using other applications (apps) compatible with our system.

## 2021-07-16 DIAGNOSIS — Z95.5 PRESENCE OF CORONARY ANGIOPLASTY IMPLANT AND GRAFT: ICD-10-CM

## 2021-07-16 DIAGNOSIS — F17.210 NICOTINE DEPENDENCE, CIGARETTES, UNCOMPLICATED: ICD-10-CM

## 2021-07-16 DIAGNOSIS — I10 ESSENTIAL (PRIMARY) HYPERTENSION: ICD-10-CM

## 2021-07-16 DIAGNOSIS — K52.9 NONINFECTIVE GASTROENTERITIS AND COLITIS, UNSPECIFIED: ICD-10-CM

## 2021-07-16 DIAGNOSIS — I25.10 ATHEROSCLEROTIC HEART DISEASE OF NATIVE CORONARY ARTERY WITHOUT ANGINA PECTORIS: ICD-10-CM

## 2021-07-16 DIAGNOSIS — Z84.89 FAMILY HISTORY OF OTHER SPECIFIED CONDITIONS: ICD-10-CM

## 2021-07-16 DIAGNOSIS — Z79.82 LONG TERM (CURRENT) USE OF ASPIRIN: ICD-10-CM

## 2021-07-16 DIAGNOSIS — B02.9 ZOSTER WITHOUT COMPLICATIONS: ICD-10-CM

## 2021-07-16 DIAGNOSIS — E78.5 HYPERLIPIDEMIA, UNSPECIFIED: ICD-10-CM

## 2021-07-16 DIAGNOSIS — Z90.49 ACQUIRED ABSENCE OF OTHER SPECIFIED PARTS OF DIGESTIVE TRACT: ICD-10-CM

## 2021-07-16 DIAGNOSIS — H26.9 UNSPECIFIED CATARACT: ICD-10-CM

## 2021-08-03 NOTE — H&P ADULT - NSHPROSALLOTHERNEGRD_GEN_ALL_CORE
All other review of systems negative, except as noted in HPI Bexarotene Pregnancy And Lactation Text: This medication is Pregnancy Category X and should not be given to women who are pregnant or may become pregnant. This medication should not be used if you are breast feeding.

## 2022-03-23 NOTE — PRE-OP CHECKLIST - HAND OFF
Patient sent [facility] message to cancel new patient appointment on 03/24/22 due to work emergency    Cancelled appointment yes

## 2022-09-26 ENCOUNTER — EMERGENCY (EMERGENCY)
Facility: HOSPITAL | Age: 82
LOS: 0 days | Discharge: HOME | End: 2022-09-26
Attending: EMERGENCY MEDICINE | Admitting: EMERGENCY MEDICINE

## 2022-09-26 VITALS
HEIGHT: 63 IN | SYSTOLIC BLOOD PRESSURE: 177 MMHG | HEART RATE: 71 BPM | OXYGEN SATURATION: 95 % | TEMPERATURE: 99 F | RESPIRATION RATE: 18 BRPM | DIASTOLIC BLOOD PRESSURE: 103 MMHG | WEIGHT: 184.09 LBS

## 2022-09-26 DIAGNOSIS — Z90.89 ACQUIRED ABSENCE OF OTHER ORGANS: Chronic | ICD-10-CM

## 2022-09-26 DIAGNOSIS — Z98.890 OTHER SPECIFIED POSTPROCEDURAL STATES: Chronic | ICD-10-CM

## 2022-09-26 DIAGNOSIS — I10 ESSENTIAL (PRIMARY) HYPERTENSION: ICD-10-CM

## 2022-09-26 DIAGNOSIS — Z87.19 PERSONAL HISTORY OF OTHER DISEASES OF THE DIGESTIVE SYSTEM: ICD-10-CM

## 2022-09-26 DIAGNOSIS — Z90.710 ACQUIRED ABSENCE OF BOTH CERVIX AND UTERUS: ICD-10-CM

## 2022-09-26 DIAGNOSIS — I25.10 ATHEROSCLEROTIC HEART DISEASE OF NATIVE CORONARY ARTERY WITHOUT ANGINA PECTORIS: ICD-10-CM

## 2022-09-26 DIAGNOSIS — R51.9 HEADACHE, UNSPECIFIED: ICD-10-CM

## 2022-09-26 DIAGNOSIS — Z79.02 LONG TERM (CURRENT) USE OF ANTITHROMBOTICS/ANTIPLATELETS: ICD-10-CM

## 2022-09-26 DIAGNOSIS — Z20.822 CONTACT WITH AND (SUSPECTED) EXPOSURE TO COVID-19: ICD-10-CM

## 2022-09-26 DIAGNOSIS — Z90.49 ACQUIRED ABSENCE OF OTHER SPECIFIED PARTS OF DIGESTIVE TRACT: Chronic | ICD-10-CM

## 2022-09-26 DIAGNOSIS — Z95.5 PRESENCE OF CORONARY ANGIOPLASTY IMPLANT AND GRAFT: ICD-10-CM

## 2022-09-26 DIAGNOSIS — Z87.42 PERSONAL HISTORY OF OTHER DISEASES OF THE FEMALE GENITAL TRACT: ICD-10-CM

## 2022-09-26 DIAGNOSIS — M79.622 PAIN IN LEFT UPPER ARM: ICD-10-CM

## 2022-09-26 DIAGNOSIS — Z86.69 PERSONAL HISTORY OF OTHER DISEASES OF THE NERVOUS SYSTEM AND SENSE ORGANS: ICD-10-CM

## 2022-09-26 DIAGNOSIS — Z90.49 ACQUIRED ABSENCE OF OTHER SPECIFIED PARTS OF DIGESTIVE TRACT: ICD-10-CM

## 2022-09-26 DIAGNOSIS — M79.602 PAIN IN LEFT ARM: ICD-10-CM

## 2022-09-26 DIAGNOSIS — E78.5 HYPERLIPIDEMIA, UNSPECIFIED: ICD-10-CM

## 2022-09-26 DIAGNOSIS — I67.1 CEREBRAL ANEURYSM, NONRUPTURED: ICD-10-CM

## 2022-09-26 DIAGNOSIS — F17.200 NICOTINE DEPENDENCE, UNSPECIFIED, UNCOMPLICATED: ICD-10-CM

## 2022-09-26 LAB
ALBUMIN SERPL ELPH-MCNC: 4 G/DL — SIGNIFICANT CHANGE UP (ref 3.5–5.2)
ALP SERPL-CCNC: 144 U/L — HIGH (ref 30–115)
ALT FLD-CCNC: 12 U/L — SIGNIFICANT CHANGE UP (ref 0–41)
ANION GAP SERPL CALC-SCNC: 7 MMOL/L — SIGNIFICANT CHANGE UP (ref 7–14)
APTT BLD: 28.3 SEC — SIGNIFICANT CHANGE UP (ref 27–39.2)
AST SERPL-CCNC: 15 U/L — SIGNIFICANT CHANGE UP (ref 0–41)
BASOPHILS # BLD AUTO: 0.04 K/UL — SIGNIFICANT CHANGE UP (ref 0–0.2)
BASOPHILS NFR BLD AUTO: 0.6 % — SIGNIFICANT CHANGE UP (ref 0–1)
BILIRUB SERPL-MCNC: 0.2 MG/DL — SIGNIFICANT CHANGE UP (ref 0.2–1.2)
BUN SERPL-MCNC: 10 MG/DL — SIGNIFICANT CHANGE UP (ref 10–20)
CALCIUM SERPL-MCNC: 9.3 MG/DL — SIGNIFICANT CHANGE UP (ref 8.4–10.5)
CHLORIDE SERPL-SCNC: 105 MMOL/L — SIGNIFICANT CHANGE UP (ref 98–110)
CO2 SERPL-SCNC: 29 MMOL/L — SIGNIFICANT CHANGE UP (ref 17–32)
CREAT SERPL-MCNC: 0.7 MG/DL — SIGNIFICANT CHANGE UP (ref 0.7–1.5)
EGFR: 87 ML/MIN/1.73M2 — SIGNIFICANT CHANGE UP
EOSINOPHIL # BLD AUTO: 0.54 K/UL — SIGNIFICANT CHANGE UP (ref 0–0.7)
EOSINOPHIL NFR BLD AUTO: 7.7 % — SIGNIFICANT CHANGE UP (ref 0–8)
GLUCOSE SERPL-MCNC: 92 MG/DL — SIGNIFICANT CHANGE UP (ref 70–99)
HCT VFR BLD CALC: 36.8 % — LOW (ref 37–47)
HGB BLD-MCNC: 12.3 G/DL — SIGNIFICANT CHANGE UP (ref 12–16)
IMM GRANULOCYTES NFR BLD AUTO: 0.3 % — SIGNIFICANT CHANGE UP (ref 0.1–0.3)
INR BLD: 0.96 RATIO — SIGNIFICANT CHANGE UP (ref 0.65–1.3)
LACTATE SERPL-SCNC: 0.7 MMOL/L — SIGNIFICANT CHANGE UP (ref 0.7–2)
LYMPHOCYTES # BLD AUTO: 1.8 K/UL — SIGNIFICANT CHANGE UP (ref 1.2–3.4)
LYMPHOCYTES # BLD AUTO: 25.8 % — SIGNIFICANT CHANGE UP (ref 20.5–51.1)
MAGNESIUM SERPL-MCNC: 2.1 MG/DL — SIGNIFICANT CHANGE UP (ref 1.8–2.4)
MCHC RBC-ENTMCNC: 31.6 PG — HIGH (ref 27–31)
MCHC RBC-ENTMCNC: 33.4 G/DL — SIGNIFICANT CHANGE UP (ref 32–37)
MCV RBC AUTO: 94.6 FL — SIGNIFICANT CHANGE UP (ref 81–99)
MONOCYTES # BLD AUTO: 0.78 K/UL — HIGH (ref 0.1–0.6)
MONOCYTES NFR BLD AUTO: 11.2 % — HIGH (ref 1.7–9.3)
NEUTROPHILS # BLD AUTO: 3.8 K/UL — SIGNIFICANT CHANGE UP (ref 1.4–6.5)
NEUTROPHILS NFR BLD AUTO: 54.4 % — SIGNIFICANT CHANGE UP (ref 42.2–75.2)
NRBC # BLD: 0 /100 WBCS — SIGNIFICANT CHANGE UP (ref 0–0)
NT-PROBNP SERPL-SCNC: 323 PG/ML — HIGH (ref 0–300)
PLATELET # BLD AUTO: 270 K/UL — SIGNIFICANT CHANGE UP (ref 130–400)
POTASSIUM SERPL-MCNC: 4.5 MMOL/L — SIGNIFICANT CHANGE UP (ref 3.5–5)
POTASSIUM SERPL-SCNC: 4.5 MMOL/L — SIGNIFICANT CHANGE UP (ref 3.5–5)
PROT SERPL-MCNC: 7.2 G/DL — SIGNIFICANT CHANGE UP (ref 6–8)
PROTHROM AB SERPL-ACNC: 11.1 SEC — SIGNIFICANT CHANGE UP (ref 9.95–12.87)
RBC # BLD: 3.89 M/UL — LOW (ref 4.2–5.4)
RBC # FLD: 14.1 % — SIGNIFICANT CHANGE UP (ref 11.5–14.5)
SARS-COV-2 RNA SPEC QL NAA+PROBE: SIGNIFICANT CHANGE UP
SODIUM SERPL-SCNC: 141 MMOL/L — SIGNIFICANT CHANGE UP (ref 135–146)
TROPONIN T SERPL-MCNC: <0.01 NG/ML — SIGNIFICANT CHANGE UP
WBC # BLD: 6.98 K/UL — SIGNIFICANT CHANGE UP (ref 4.8–10.8)
WBC # FLD AUTO: 6.98 K/UL — SIGNIFICANT CHANGE UP (ref 4.8–10.8)

## 2022-09-26 PROCEDURE — 99285 EMERGENCY DEPT VISIT HI MDM: CPT

## 2022-09-26 PROCEDURE — 70450 CT HEAD/BRAIN W/O DYE: CPT | Mod: 26,MA

## 2022-09-26 PROCEDURE — 93010 ELECTROCARDIOGRAM REPORT: CPT

## 2022-09-26 PROCEDURE — 71045 X-RAY EXAM CHEST 1 VIEW: CPT | Mod: 26

## 2022-09-26 RX ORDER — ACETAMINOPHEN 500 MG
975 TABLET ORAL ONCE
Refills: 0 | Status: COMPLETED | OUTPATIENT
Start: 2022-09-26 | End: 2022-09-26

## 2022-09-26 RX ADMIN — Medication 975 MILLIGRAM(S): at 21:27

## 2022-09-26 NOTE — ED PROVIDER NOTE - NS ED ROS FT
Review of Systems  Constitutional:  No fever, chills.  Eyes:  No visual changes, eye pain, or discharge.  ENMT:  No hearing changes, pain, or discharge. No nasal congestion, discharge, or bleeding. No throat pain, swelling, or difficulty swallowing.  Cardiac:  No chest pain, palpitations, syncope, or edema.  Respiratory:  No dyspnea, cough. No hemoptysis.  GI:  No nausea, vomiting, diarrhea, or abdominal pain.  :  No dysuria, hematuria, frequency, or burning.   MS:  No back pain. (+) left arm pain  Skin:  No skin rash, pruritis, jaundice, or lesions.  Neuro:  No dizziness, loss of sensation, or focal weakness.  No change in mental status. (+) headache

## 2022-09-26 NOTE — ED PROVIDER NOTE - NSFOLLOWUPINSTRUCTIONS_ED_ALL_ED_FT
Headache    A headache is pain or discomfort felt around the head or neck area. The specific cause of a headache may not be found as there are many types including tension headaches, migraine headaches, and cluster headaches. Watch your condition for any changes. Things you can do to manage your pain include taking over the counter and prescription medications as instructed by your health care provider, lying down in a dark quiet room, limiting stress, getting regular sleep, and refraining from alcohol and tobacco products.    SEEK IMMEDIATE MEDICAL CARE IF YOU HAVE ANY OF THE FOLLOWING SYMPTOMS: fever, vomiting, stiff neck, loss of vision, problems with speech, muscle weakness, loss of balance, trouble walking, passing out, or confusion.     Hypertension  Hypertension, commonly called high blood pressure, is when the force of blood pumping through the arteries is too strong. The arteries are the blood vessels that carry blood from the heart throughout the body. Hypertension forces the heart to work harder to pump blood and may cause arteries to become narrow or stiff. Having untreated or uncontrolled hypertension can cause heart attacks, strokes, kidney disease, and other problems.    A blood pressure reading consists of a higher number over a lower number. Ideally, your blood pressure should be below 120/80. The first ("top") number is called the systolic pressure. It is a measure of the pressure in your arteries as your heart beats. The second ("bottom") number is called the diastolic pressure. It is a measure of the pressure in your arteries as the heart relaxes.    What are the causes?  The cause of this condition is not known.    What increases the risk?  Some risk factors for high blood pressure are under your control. Others are not.    Factors you can change     Smoking.  Having type 2 diabetes mellitus, high cholesterol, or both.  Not getting enough exercise or physical activity.  Being overweight.  Having too much fat, sugar, calories, or salt (sodium) in your diet.  Drinking too much alcohol.  Factors that are difficult or impossible to change     Having chronic kidney disease.  Having a family history of high blood pressure.  Age. Risk increases with age.  Race. You may be at higher risk if you are -American.  Gender. Men are at higher risk than women before age 45. After age 65, women are at higher risk than men.  Having obstructive sleep apnea.  Stress.  What are the signs or symptoms?  Extremely high blood pressure (hypertensive crisis) may cause:    Headache.  Anxiety.  Shortness of breath.  Nosebleed.  Nausea and vomiting.  Severe chest pain.  Jerky movements you cannot control (seizures).    How is this diagnosed?  This condition is diagnosed by measuring your blood pressure while you are seated, with your arm resting on a surface. The cuff of the blood pressure monitor will be placed directly against the skin of your upper arm at the level of your heart. It should be measured at least twice using the same arm. Certain conditions can cause a difference in blood pressure between your right and left arms.    Certain factors can cause blood pressure readings to be lower or higher than normal (elevated) for a short period of time:    When your blood pressure is higher when you are in a health care provider's office than when you are at home, this is called white coat hypertension. Most people with this condition do not need medicines.  When your blood pressure is higher at home than when you are in a health care provider's office, this is called masked hypertension. Most people with this condition may need medicines to control blood pressure.    If you have a high blood pressure reading during one visit or you have normal blood pressure with other risk factors:    You may be asked to return on a different day to have your blood pressure checked again.  You may be asked to monitor your blood pressure at home for 1 week or longer.    If you are diagnosed with hypertension, you may have other blood or imaging tests to help your health care provider understand your overall risk for other conditions.    How is this treated?  This condition is treated by making healthy lifestyle changes, such as eating healthy foods, exercising more, and reducing your alcohol intake. Your health care provider may prescribe medicine if lifestyle changes are not enough to get your blood pressure under control, and if:    Your systolic blood pressure is above 130.  Your diastolic blood pressure is above 80.    Your personal target blood pressure may vary depending on your medical conditions, your age, and other factors.    Follow these instructions at home:  Eating and drinking     Eat a diet that is high in fiber and potassium, and low in sodium, added sugar, and fat. An example eating plan is called the DASH (Dietary Approaches to Stop Hypertension) diet. To eat this way:    Eat plenty of fresh fruits and vegetables. Try to fill half of your plate at each meal with fruits and vegetables.  Eat whole grains, such as whole wheat pasta, brown rice, or whole grain bread. Fill about one quarter of your plate with whole grains.  Eat or drink low-fat dairy products, such as skim milk or low-fat yogurt.  Avoid fatty cuts of meat, processed or cured meats, and poultry with skin. Fill about one quarter of your plate with lean proteins, such as fish, chicken without skin, beans, eggs, and tofu.  Avoid premade and processed foods. These tend to be higher in sodium, added sugar, and fat.    Reduce your daily sodium intake. Most people with hypertension should eat less than 1,500 mg of sodium a day.  Limit alcohol intake to no more than 1 drink a day for nonpregnant women and 2 drinks a day for men. One drink equals 12 oz of beer, 5 oz of wine, or 1½ oz of hard liquor.  Lifestyle     Work with your health care provider to maintain a healthy body weight or to lose weight. Ask what an ideal weight is for you.  Get at least 30 minutes of exercise that causes your heart to beat faster (aerobic exercise) most days of the week. Activities may include walking, swimming, or biking.  Include exercise to strengthen your muscles (resistance exercise), such as pilates or lifting weights, as part of your weekly exercise routine. Try to do these types of exercises for 30 minutes at least 3 days a week.  Do not use any products that contain nicotine or tobacco, such as cigarettes and e-cigarettes. If you need help quitting, ask your health care provider.  Monitor your blood pressure at home as told by your health care provider.  Keep all follow-up visits as told by your health care provider. This is important.  Medicines     Take over-the-counter and prescription medicines only as told by your health care provider. Follow directions carefully. Blood pressure medicines must be taken as prescribed.  Do not skip doses of blood pressure medicine. Doing this puts you at risk for problems and can make the medicine less effective.  Ask your health care provider about side effects or reactions to medicines that you should watch for.  Contact a health care provider if:  You think you are having a reaction to a medicine you are taking.  You have headaches that keep coming back (recurring).  You feel dizzy.  You have swelling in your ankles.  You have trouble with your vision.  Get help right away if:  You develop a severe headache or confusion.  You have unusual weakness or numbness.  You feel faint.  You have severe pain in your chest or abdomen.  You vomit repeatedly.  You have trouble breathing.  Summary  Hypertension is when the force of blood pumping through your arteries is too strong. If this condition is not controlled, it may put you at risk for serious complications.  Your personal target blood pressure may vary depending on your medical conditions, your age, and other factors. For most people, a normal blood pressure is less than 120/80.  Hypertension is treated with lifestyle changes, medicines, or a combination of both. Lifestyle changes include weight loss, eating a healthy, low-sodium diet, exercising more, and limiting alcohol.  This information is not intended to replace advice given to you by your health care provider. Make sure you discuss any questions you have with your health care provider.    What is a cerebral aneurysm?  A cerebral aneurysm (also known as a brain aneurysm) is a weak or thin spot on an artery in the brain that balloons or bulges out and fills with blood.  The bulging aneurysm can put pressure on the nerves or brain tissue.  It may also burst or rupture, spilling blood into the surrounding tissue (called a hemorrhage).  A ruptured aneurysm can cause serious health problems such as hemorrhagic stroke, brain damage, coma, and even death.     Some cerebral aneurysms, particularly those that are very small, do not bleed or cause other problems. These types of aneurysms are usually detected during imaging tests for other medical conditions.  Cerebral aneurysms can occur anywhere in the brain, but most form in the major arteries along the base of the skull.    Brain aneurysms can occur in anyone and at any age.  They are most common in adults between the ages of 30 and 60 and are more common in women than in men.  People with certain inherited disorders are also at higher risk.    All cerebral aneurysms have the potential to rupture and cause bleeding within the brain or surrounding area. Approximately 30,000 Americans per year suffer a brain aneurysm rupture. Much less is known about how many people have cerebral aneurysms, since they don’t always cause symptoms. There are no proven statistics but a consensus of scientific papers indicate that between 3 and 5 percent of Americans may have an aneurysm in their lifetime.    top    What are the symptoms?  Unruptured aneurysm  Most cerebral aneurysms do not show symptoms until they either become very large or rupture.  Small unchanging aneurysms generally will not produce symptoms.     A larger aneurysm that is steadily growing may press on tissues and nerves causing:    pain above and behind the eye  numbness  weakness  paralysis on one side of the face  a dilated pupil in the eye  vision changes or double vision.  Ruptured aneurysm  When an aneurysm ruptures (bursts), one always experiences a sudden and extremely severe headache (e.g., the worst headache of one’s life) and may also develop:    double vision  nausea  vomiting  stiff neck  sensitivity to light  seizures  loss of consciousness (this may happen briefly or may be prolonged)  cardiac arrest.  Leaking aneurysm  Sometimes an aneurysm may leak a small amount of blood into the brain (called a sentinel bleed).  [place] or warning headaches may result from an aneurysm that suffers a tiny leak, days or weeks prior to a significant rupture.  However, only a minority of individuals have a sentinel headache prior to rupture.     If you experience a sudden, severe headache, especially when it is combined with any other symptoms, you should seek immediate medical attention.     top    How are aneurysms classified?  Type  There are three types of cerebral aneurysms:     Saccular aneurysm.  A saccular aneurysm is a rounded sac containing blood, that is attached to a main artery or one of its branches.  Also known as a berry aneurysm (because it resembles a berry hanging from a vine), this is the most common form of cerebral aneurysm.  It is typically found on arteries at the base of the brain.  Saccular aneurysms occur most often in adults.  Fusiform aneurysm.  A fusiform aneurysm balloons or bulges out on all sides of the artery.   Mycotic aneurysm.  A mycotic aneurysm occurs as the result of an infection that can sometimes affect the arteries in the brain.  The infection weakens the artery wall, causing a bulging aneurysm to form.    Size  Aneurysms are also classified by size: small, large, and giant.     Small aneurysms are less than 11 millimeters in diameter (about the size of a large pencil eraser).  Large aneurysms are 11 to 25 millimeters (about the width of a dime).  Giant aneurysms are greater than 25 millimeters in diameter (more than the width of a quarter).  top    What causes a cerebral aneurysm?  Cerebral aneurysms form when the walls of the arteries in the brain become thin and weaken.  Aneurysms typically form at branch points in arteries because these sections are the weakest.  Occasionally, cerebral aneurysms may be present from birth, usually resulting from an abnormality in an artery wall.               Risk factors for developing an aneurysm    Sometimes cerebral aneurysms are the result of inherited risk factors, including:    genetic connective tissue disorders that weaken artery walls  polycystic kidney disease (in which numerous cysts form in the kidneys)  arteriovenous malformations (snarled tangles of arteries and veins in the brain that disrupt blood flow.  Some AVMs develop sporadically, or on their own.)  history of aneurysm in a first-degree family member (child, sibling, or parent).  Other risk factors develop over time and include:    untreated high blood pressure  cigarette smoking  drug abuse, especially cocaine or amphetamines, which raise blood pressure to dangerous levels. Intravenous drug abuse is a cause of infectious mycotic aneurysms.  age over 40.  Less common risk factors include:    head trauma  brain tumor  infection in the arterial wall (mycotic aneurysm).  Additionally, high blood pressure, cigarette smoking, diabetes, and high cholesterol puts one at risk of atherosclerosis (a blood vessel disease in which fats build up on the inside of artery walls), which can increase the risk of developing a fusiform aneurysm.    Risk factors for an aneurysm to rupture    Not all aneurysms will rupture.  Aneurysm characteristics such as size, location, and growth during follow-up evaluation may affect the risk that an aneurysm will rupture. In addition, medical conditions may influence aneurysm rupture.    Risk factors include:    Smoking.  Smoking is linked to both the development and rupture of cerebral aneurysms. Smoking may even cause multiple aneurysms to form in the brain.  High blood pressure.  High blood pressure damages and weakens arteries, making them more likely to form and to rupture.   Size.  The largest aneurysms are the ones most likely to rupture in a person who previously did not show symptoms.  Location.  Aneurysms located on the posterior communicating arteries (a pair of arteries in the back part of the brain) and possibly those on the anterior communicating artery (a single artery in the front of the brain) have a higher risk of rupturing than those at other locations in the brain.  Growth.  Aneurysms that grow, even if they are small, are at increased risk of rupture.  Family history.  A family history of aneurysm rupture suggests a higher risk of rupture for aneurysms detected in family members.  The greatest risk occurs in individuals with multiple aneurysms who have already suffered a previous rupture or sentinel bleed.  top    How are cerebral aneurysms diagnosed?  Most cerebral aneurysms go unnoticed until they rupture or are detected during medical imaging tests for another condition.     If you have experienced a severe headache or have any other symptoms related to a ruptured aneurysm your doctor will order tests to determine if blood has leaked into the space between the skull bone and brain.     Several tests are available to diagnose brain aneurysms and determine the best treatment. These include:     Computed tomography (CT).  This fast and painless scan is often the first test a physician will order to determine if blood has leaked into the brain.  CT uses x-rays to create two-dimensional images, or “slices,” of the brain and skull.  Occasionally a contrast dye is injected into the bloodstream prior to scanning to assess the arteries, and look for a possible aneurysm.  This process, called CT angiography (CTA), produces sharper, more detailed images of blood flow in the brain arteries.  CTA can show the size, location, and shape of an unruptured or a ruptured aneurysm.   Magnetic resonance imaging (MRI).   An MRI uses computer-generated radio waves and a magnetic field to create two- and three-dimensional detailed images of the brain and can determine if there has been bleeding into the brain.  Magnetic resonance angiography (MRA) produces detailed images of the brain arteries and can show the size, location, and shape of an aneurysm.   Cerebral angiography.  This imaging technique can find blockages in arteries in the brain or neck.  It also can identify weak spots in an artery, like an aneurysm.  The test is used to determine the cause of the bleeding in the brain and the exact location, size, and shape of an aneurysm.  Your doctor will pass a catheter (long, flexible tube) typically from the groin arteries to inject a small amount of contrast dye into your neck and brain arteries.  The contrast dye helps the X-ray create a detailed picture of the appearance of an aneurysm and a clear picture of any blockage in the arteries.   Cerebrospinal fluid (CSF) analysis.  This test measures the chemicals in the fluid that cushions and protects the brain and spinal cord (cerebrospinal fluid).  Most often a doctor will collect the CSF by performing a spinal tap (lumbar puncture), in which a thin needle is inserted into the lower back (lumbar spine) and a small amount of fluid is removed and tested.   The results will help detect any bleeding around the brain.  If bleeding is detected, additional tests would be needed to identify the exact cause of the bleeding.   top    What are the complications of a ruptured cerebral aneurysm?  Aneurysms may rupture and bleed into the space between the skull and the brain (subarachnoid hemorrhage) and sometimes into the brain tissue (intracerebral hemorrhage). These are forms of stroke called hemorrhagic stroke.  The bleeding into the brain can cause a wide spectrum of symptoms, from a mild headache to permanent damage to the brain, or even death.      After an aneurysm has ruptured it may cause serious complications such as:    Rebleeding.  Once it has ruptured, an aneurysm may rupture again before it is treated, leading to further bleeding into the brain, and causing more damage or death.      Change in sodium level.  Bleeding in the brain can disrupt the balance of sodium in the blood supply and cause swelling in brain cells.  This can result in permanent brain damage.   Hydrocephalus.  Subarachnoid hemorrhage can cause hydrocephalus.  Hydrocephalus is a buildup of too much cerebrospinal fluid in the brain, which causes pressure that can lead to permanent brain damage or death.  Hydrocephalus occurs frequently after subarachnoid hemorrhage because the blood blocks the normal flow of cerebrospinal fluid. If left untreated, increased pressure inside the head can cause coma or death.   Vasospasm.  This occurs frequently after subarachnoid hemorrhage when the bleeding causes the arteries in the brain to contract and limit blood flow to vital areas of the brain.  This can cause strokes from lack of adequate blood flow to parts of the brain.  Seizures.  Aneurysm bleeding can cause seizures (convulsions), either at the time of bleed or in the immediate aftermath.  While most seizures are evident, on occasion they may only be seen by sophisticated brain testing.  Untreated seizures or those that do not respond to treatment can cause brain damage.    top    How are cerebral aneurysms treated?  Not all cerebral aneurysms require treatment.  Some very small unruptured aneurysms that are not associated with any factors suggesting a higher risk of rupture may be safely left alone and monitored with MRA or CTA to detect any growth.  It is important to aggressively treat any coexisting medical problems and risk factors.    Treatments for unruptured cerebral aneurysms that have not shown symptoms have some potentially serious complications and should be carefully weighed against the predicted rupture risk.    Treatment considerations for unruptured aneurysms  A doctor will consider a variety of factors when determining the best option for treating an unruptured aneurysm, including:    type, size, and location of the aneurysm  risk of rupture  the person’s age and health  personal and family medical history  risk of treatment.  Individuals should also take the following steps to reduce the risk of aneurysm rupture:    carefully control blood pressure  stop smoking  avoid cocaine use or other stimulant drugs.   Treatments for unruptured and ruptured cerebral aneurysms  Surgery, endovascular treatments, or other therapies are often recommended to manage symptoms and prevent damage from unruptured and ruptured aneurysms.    Surgery  There are a few surgical options available for treating cerebral aneurysms.  These procedures carry some risk such as possible damage to other blood vessels, the potential for aneurysm recurrence and rebleeding, and a risk of stroke.     Microvascular clipping.  This procedure involves cutting off the flow of blood to the aneurysm and requires open brain surgery.  A doctor will locate the blood vessels that feed the aneurysm and place a tiny, metal, clothespin-like clip on the aneurysm’s neck to stop its blood supply.  Clipping has been shown to be highly effective, depending on the location, size, and shape of the aneurysm.  In general, aneurysms that are completely clipped do not recur.   Endovascular treatment    Platinum coil embolization.  This procedure is a less invasive procedure than microvascular surgical clipping.  A doctor will insert a hollow plastic tube (a catheter) into an artery, usually in the groin, and thread it through the body to the brain aneurysm.  Using a wire, the doctor will pass detachable coils (tiny spirals of platinum wire) through the catheter and release them into the aneurysm.  The coils block the aneurysm and reduce the flow of blood into the aneurysm. The procedure may need to be performed more than once during the person’s lifetime because aneurysms treated with coiling can sometimes recur.  Flow diversion devices.  Other endovascular treatment options include placing a small stent (flexible mesh tube) similar to those placed for heart blockages, in the artery to reduce blood flow into the aneurysm.  A doctor will insert a hollow plastic tube (a catheter) into an artery, usually in the groin, and thread it through the body to the artery on which the aneurysm is located.   This procedure is used to treat very large aneurysms and those that cannot be treated with surgery or platinum coil embolization.      Other treatments  Other treatments for a ruptured cerebral aneurysm aim to control symptoms and reduce complications.  These treatments include:    Antiseizure drugs (anticonvulsants).  These drugs may be used to prevent seizures related to a ruptured aneurysm.   Calcium channel-blocking drugs.  Risk of stroke by vasospasm can be reduced with calcium channel-blocking drugs.  .   A shunt, which funnels cerebrospinal fluid from the brain to elsewhere in the body, may be surgically inserted into the brain following rupture if the buildup of cerebrospinal fluid (hydrocephalus) is causing harmful pressure on surrounding brain tissue.   Rehabilitative therapy.   Individuals who have suffered a subarachnoid hemorrhage often need physical, speech, and occupational therapy to regain lost function and learn to cope with any permanent disability.

## 2022-09-26 NOTE — ED PROVIDER NOTE - NSFOLLOWUPCLINICS_GEN_ALL_ED_FT
[facility]  Neurology  [address] [place] [address], [facility] [address]  [place] [address]  Phone: [phone]  Fax:   Follow Up Time: 4-6 Days

## 2022-09-26 NOTE — ED PROVIDER NOTE - CARE PROVIDER_API CALL
Oscar Deleon)  Neurology; Vascular Neurology  [address] [place] [address], [address]  [place], [place] [id number]  Phone: [phone]  Fax: [phone]  Follow Up Time: 1-3 Days    Alpa Patel)  Neurology  [address] [place] [id number]  Phone: [phone]  Fax: [phone]  Follow Up Time: 1-3 Days

## 2022-09-26 NOTE — ED PROVIDER NOTE - PROGRESS NOTE DETAILS
Spoke with neurology and neurosurgery, states pt can follow-up as outpt with neuroendovascular for aneurysm surveillance and management given it is less than 5mm and pt is currently asymptomatic. Pt reports improvement in symptoms, currently asymptomatic. Discussed results and provided copy to pt/daughter. Pt understands to follow-up with PMD/neurology/neuro endovascular. Pt understands to return to ED if symptoms return/worsen. Agreeable to discharge.

## 2022-09-26 NOTE — ED PROVIDER NOTE - NS ED ATTENDING STATEMENT MOD
This was a shared visit with the EMILY. I reviewed and verified the documentation and independently performed the documented:

## 2022-09-26 NOTE — ED PROVIDER NOTE - OBJECTIVE STATEMENT
82 yo F with PMHx of HTN, HLD, vertigo, cataracts, cluster headaches (20 years ago, resolved), CAD s/p 2 stents, left CEA, partial hysterectomy, cholecystectomy, ovarian cystectomy, and hernia repair presents to the ED c/o intermittent R sided headache and left arm pain x 1 week. Headache is pressure like, non-radiating and is associated with elevated BP readings. Arm pain feels like her "arthritis" pain. She has not taken any medication to improve symptoms. Pt states PMD increased her metoprolol to 50 BID today given elevated readings but pt came to ED for evaluation. She denies other complaints. Pt denies fever, chills, nausea, vomiting, abdominal pain, diarrhea, dizziness, weakness, chest pain, SOB, back pain, LOC, trauma, urinary symptoms, cough, calf pain/swelling, recent travel, recent surgery.

## 2022-09-26 NOTE — ED PROVIDER NOTE - CARE PLAN
Principal Discharge DX:	Headache  Secondary Diagnosis:	Elevated blood pressure reading with diagnosis of hypertension  Secondary Diagnosis:	Intracranial aneurysm   1

## 2022-09-26 NOTE — ED PROVIDER NOTE - PHYSICAL EXAMINATION
VITAL SIGNS: I have reviewed nursing notes and confirm.  CONSTITUTIONAL: Elderly female laying on stretcher; in no acute distress.  SKIN: Skin exam is warm and dry, no acute rash.  HEAD: Normocephalic; atraumatic.  EYES: PERRL, EOM intact; conjunctiva and sclera clear.  ENT: No nasal discharge; airway clear.   NECK: Supple; non tender.  No meningeal signs.   CARD: S1, S2 normal; no murmurs, gallops, or rubs. Regular rate and rhythm.  RESP: No wheezes, rales or rhonchi. Speaking in full sentences.   ABD: Normal bowel sounds; soft; non-distended; non-tender; No rebound or guarding. No CVA tenderness.  EXT: Normal ROM. No clubbing, cyanosis or edema. Radial and DP 2+ B/L and equal.   NEURO: Alert, oriented. Grossly unremarkable. No focal deficits. CN II-XII intact. No dysmetria. No ataxia. Sensation intact and equal throughout. Strength 5/5 throughout. Gait steady.

## 2022-09-26 NOTE — ED PROVIDER NOTE - PATIENT PORTAL LINK FT
You can access the FollowMyHealth Patient Portal offered by [facility] by registering at the following website: [email or web address]. By joining [employer]’s FollowMyHealth portal, you will also be able to view your health information using other applications (apps) compatible with our system.

## 2022-09-26 NOTE — ED PROVIDER NOTE - PROVIDER TOKENS
PROVIDER:[TOKEN:[[id number]],FOLLOWUP:[1-3 Days]],PROVIDER:[TOKEN:[[id number]],FOLLOWUP:[1-3 Days]]

## 2022-09-26 NOTE — ED PROVIDER NOTE - CLINICAL SUMMARY MEDICAL DECISION MAKING FREE TEXT BOX
81-year-old female with past medical history of vertigo, cataracts, shingles (last year), HLD, cluster headaches 20 years ago, CAD X2 stents, HTN, left CEA, partial hysterectomy, cholecystectomy, hernia repair, ovarian cyst surgery, presents to the ED for 1 week of right-sided headache and left upper extremity pain for the past few days.  Patient states headache comes and goes but has been severe lately.  Denies any numbness/weakness/slurred speech/fever/chills/cough/abdominal pain/dysuria/nausea/vomiting/fever/chest pain/shortness of breath/cough/dizziness.  No incontinence.  Patient denies any trauma, falls, increased tenderness activity.  Denies radiation of LUE pain into chest or neck. Pt took another dose of 25 mg Metoprolol while in ER (total today took 75 mg). BP improved. EKG/labs/cxr ok. CT head non contrast shows no acute disease, CTA head/neck shows supraclinoid RIGHT ICA 3.5 mm aneurysm. Discussed with neurosurgery and neurology. Given HA improved, they state pt can be evaluated as an outpatient  lorenzo if aneursym less than 5 mm in size. Pt and daughter are requesting to go home. Results explained. Follow up explained. Return precautions given. To dc

## 2022-09-26 NOTE — ED ADULT NURSE NOTE - NSFALLRSKASSISTTYPE_ED_ALL_ED
Left message and callback number reminding patient of appointment with Dr. Amadeo Jose on 09/24/2018 at 10:30 am.   
Standing/Walking/Toileting

## 2022-09-26 NOTE — ED PROVIDER NOTE - ATTENDING APP SHARED VISIT CONTRIBUTION OF CARE
81-year-old female with past medical history of vertigo, cataracts, shingles (last year), HLD, cluster headaches 20 years ago, CAD X2 stents, HTN, left CEA, partial hysterectomy, cholecystectomy, hernia repair, ovarian cyst surgery, presents to the ED for 1 week of right-sided headache and left upper extremity pain for the past few days.  Patient states headache comes and goes but has been severe lately.  Denies any numbness/weakness/slurred speech/fever/chills/cough/abdominal pain/dysuria/nausea/vomiting/fever/chest pain/shortness of breath/cough/dizziness.  No incontinence.  Patient denies any trauma, falls, increased tenderness activity.  Denies radiation of LUE pain into chest or neck.    On exam patient in NAD, NCAT, PERRL, EOMI.  No carotid bruits.  Lungs: CTAB.  Heart: Regular S1-S2.  Abdomen: Soft, ND/NT, + BS, no mass.  EXT: + Pedal edema especially around the ankles bilaterally, no calf tenderness, distal pulses intact.  Neuro: Alert and oriented x3, no motor or sensory weakness, no slurred speech no facial droop.    A/P headache x1 week, associated with elevated blood pressure.  States she called her PMD who increased her metoprolol to 50 mg twice daily.  Has not yet started the new meds yet.  We will check labs, EKG, CT scan of the head and neck and reassess    ALL: nkda  Meds: losartan 100, meclizine, metoprolol 25 bid, gabapentin 300 bid, zolpidem 10 qhs prn, asa 81, plavix 75 mg,   SH +smoker, no etoh  PMD Marco

## 2022-09-27 VITALS
HEART RATE: 74 BPM | OXYGEN SATURATION: 98 % | RESPIRATION RATE: 18 BRPM | DIASTOLIC BLOOD PRESSURE: 85 MMHG | SYSTOLIC BLOOD PRESSURE: 137 MMHG

## 2022-09-27 LAB — TROPONIN T SERPL-MCNC: <0.01 NG/ML — SIGNIFICANT CHANGE UP

## 2022-09-27 PROCEDURE — 70496 CT ANGIOGRAPHY HEAD: CPT | Mod: 26,MA

## 2022-09-27 PROCEDURE — 70498 CT ANGIOGRAPHY NECK: CPT | Mod: 26,MA

## 2022-11-01 ENCOUNTER — APPOINTMENT (OUTPATIENT)
Dept: NEUROLOGY | Facility: CLINIC | Age: 82
End: 2022-11-01

## 2022-11-01 ENCOUNTER — NON-APPOINTMENT (OUTPATIENT)
Age: 82
End: 2022-11-01

## 2022-11-01 PROCEDURE — 99203 OFFICE O/P NEW LOW 30 MIN: CPT

## 2022-11-02 NOTE — ASSESSMENT
[FreeTextEntry1] : Patient is 80 yo RH woman with PMHx of HTN, HLD, vertigo, cluster HAs (last episode 20 yrs ago), CAD s/p 2 stents, L CEA who present for initial evaluation of incidental supra-clinoid right ICA 3.5 mm aneurysm reported on CTA H done on ED visit on 9/26/22 for c/o of R sided HA and hypertension (ED BP was 177/103). To my eyes, the outpouching may be a result of atherosclerotic changes but it is unclear on CTA imaging. Her main aneurysm RF including HTN for over 40 years, 60 year smoker, currently smoking 1 pack every three days, as well as atherosclerotic plaque. \par \par PLAN:\par -MRA H for better visualization of aneurysm\par -BP goal <140/80\par -Advised on smoking cessation \par -RTC after MRA H imaging to see Dr. Deleon  \par -Instructed patient to call 911 or report to ED if any acute neurological changes occur, such as having severe, sudden, unusual headache or BEFAST symptoms\par

## 2022-11-02 NOTE — HISTORY OF PRESENT ILLNESS
[FreeTextEntry1] : Patient is 82 yo RH woman with PMHx of HTN, HLD, vertigo, cluster HAs (last episode 20 yrs ago), CAD s/p 2 stents, L CEA who present for initial evaluation of incidental supra-clinoid right ICA 3.5 mm aneurysm reported on CTA H done on ED visit on 9/26/22 for c/o of R sided HA and hypertension (ED BP was 177/103).  \par \par Since, she saw Dr. Patel for [facility] mgmt. Patient was [facility] to [facility] from [facility] ED. \par \par Her main aneurysm RF including HTN for over 40 years, 60 year smoker, currently smoking 1 pack every three days, as well as atherosclerotic plaque. She has 1 daughter and no siblings. Patient denies first degree relative with history of intracranial hemorrhage or aneurysm.  Patient denies history of collagen tissue disorder, connective tissue disease, infectious disease or inflammatory disease.  Patient also denies illicit drug use or alcohol use.\par \par

## 2022-11-02 NOTE — PHYSICAL EXAM
[FreeTextEntry1] : NIH STROKE SCALE \par \par Item	 Score \par \par 1 a.	Level of Consciousness	 0 \par 1 b.          LOC Questions	 0 \par 1 c.	LOC Commands	 0 \par 2.	Best Gaze	 0 \par 3.	Visual	                 0 \par 4.	Facial Palsy              0 \par 5 a.	Motor Arm - Left	 0 \par 5 b.	Motor Arm - Right	 0 \par 6 a.	Motor Leg - Left	 0 \par 6 b.	Motor Leg - Right	 0 \par 7.	Limb Ataxia	 0 \par 8.	Sensory	                 0 \par 9.	Language	 0 \par 10.	Dysarthria	 0 \par 11.	Extinction and Inattention 	 0 \par __________________________________________ \par \par TOTAL	 0 \par \par mRS: 0 No symptoms at all \par \par \par Neurologic Exam: \par \par Mental status: Awake, alert and oriented x 4. Recent and remote memory intact.  \par \par Language: Follows all commands. Naming, repetition and comprehension intact. Attention/concentration intact. No dysarthria, no aphasia. Fund of knowledge appropriate.  \par \par Cranial nerves: Pupils equally round and reactive to light, visual fields full, no nystagmus, EOMI, face symmetric, hearing intact bilaterally, palate elevation symmetric, tongue was midline, sternocleidomastoid/ shoulder shrug strength bilaterally 5/5.  \par \par Motor: No drifting in all extremities. Normal bulk and tone, strength 5/5 in bilateral upper and lower extremities.  strength 5/5. 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 No neglect.  \par \par Coordination: No dysmetria on finger-to-nose and heel-to-shin.  \par \par Gait: Steady.\par

## 2022-11-02 NOTE — REASON FOR VISIT
[Initial Evaluation] : an initial evaluation [Family Member] : family member [FreeTextEntry1] : intracranial aneurysm

## 2022-11-14 ENCOUNTER — RESULT REVIEW (OUTPATIENT)
Age: 82
End: 2022-11-14

## 2022-11-14 ENCOUNTER — OUTPATIENT (OUTPATIENT)
Dept: OUTPATIENT SERVICES | Facility: HOSPITAL | Age: 82
LOS: 1 days | Discharge: HOME | End: 2022-11-14

## 2022-11-14 DIAGNOSIS — Z98.890 OTHER SPECIFIED POSTPROCEDURAL STATES: Chronic | ICD-10-CM

## 2022-11-14 DIAGNOSIS — I67.1 CEREBRAL ANEURYSM, NONRUPTURED: ICD-10-CM

## 2022-11-14 DIAGNOSIS — Z90.49 ACQUIRED ABSENCE OF OTHER SPECIFIED PARTS OF DIGESTIVE TRACT: Chronic | ICD-10-CM

## 2022-11-14 DIAGNOSIS — Z90.89 ACQUIRED ABSENCE OF OTHER ORGANS: Chronic | ICD-10-CM

## 2022-11-14 PROCEDURE — 70544 MR ANGIOGRAPHY HEAD W/O DYE: CPT | Mod: 26

## 2022-11-28 ENCOUNTER — APPOINTMENT (OUTPATIENT)
Dept: NEUROLOGY | Facility: CLINIC | Age: 82
End: 2022-11-28

## 2022-11-28 VITALS
TEMPERATURE: 97.6 F | HEART RATE: 64 BPM | HEIGHT: 63 IN | BODY MASS INDEX: 33.31 KG/M2 | WEIGHT: 188 LBS | DIASTOLIC BLOOD PRESSURE: 86 MMHG | OXYGEN SATURATION: 95 % | SYSTOLIC BLOOD PRESSURE: 140 MMHG

## 2022-11-28 PROCEDURE — 99214 OFFICE O/P EST MOD 30 MIN: CPT

## 2022-11-28 NOTE — HISTORY OF PRESENT ILLNESS
[FreeTextEntry1] : Patient is 82 yo RH woman with PMHx of HTN, HLD, vertigo, cluster HAs (last episode 20 yrs ago), CAD s/p 2 stents, L CEA who present for follow up for incidental supra-clinoid right ICA 3.5 mm aneurysm reported on CTA H done on ED visit on 9/26/22 for c/o of R sided HA and hypertension. To my eyes, the outpouching may be a result of atherosclerotic changes but it is unclear on CTA imaging. Her main aneurysm RF including HTN for over 40 years, 60 year smoker, currently smoking 1 pack every three days, as well as atherosclerotic plaque.  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  \par \par Today, she c/o of throbbing HA in the R side that started a few months ago. Fioricet given by Dr. Patel her neurologist, helps. Patient still smokes. BP today is 140/86.\par

## 2022-11-28 NOTE — ASSESSMENT
[FreeTextEntry1] : Patient is 80 yo RH woman with PMHx of HTN, HLD, vertigo, cluster HAs (last episode 20 yrs ago), CAD s/p 2 stents, L CEA who present for follow up for incidental supra-clinoid right ICA 3.5 mm aneurysm reported on CTA H done on ED visit on 9/26/22 for c/o of R sided HA and hypertension. To my eyes, the outpouching may be a result of atherosclerotic changes but it is unclear on CTA imaging. Her main aneurysm RF including HTN for over 40 years, 60 year smoker, currently smoking 1 pack every three days, as well as atherosclerotic plaque.  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  Today, she still c/o of HAs on the right side of her head. She still smokes. UIATS is in favor of conservative management with score of about 12 versus 5 for intervention. \par \par SUGGESTIONS:\par -Repeat MRA H in 6 mo \par -We provided her a list of NI physicians at [facility] who she should follow up with. \par -BP goal <140/80\par -F/u with Dr. Patel for HAs. She may see HA specialist if there is concern that her HAs are related to the aneurysm. If so then the aneurysm considered symptomatic and need to be treated.\par -EMPHASIZED on smoking cessation and alcohol avoidance\par -Instructed patient to call 911 or report to ED if any acute neurological changes occur, such as having severe, sudden, unusual headache or BEFAST symptoms\par

## 2022-12-28 NOTE — SWALLOW BEDSIDE ASSESSMENT ADULT - SWALLOW EVAL: MANDIBULAR STRENGTH AND MOBILITY
Subjective .     REASONS FOR FOLLOW UP:  1. chronic lymphocytic leukemia with recurrent lung infections    Referring MD:    Amarilis Suarez MD    History of Present Illness patient is an.age female with 2014 with stage 0 CLL which is never required treatments.    In 2019 she  had multiple hospitalizations for lung infections predominantly viral probably also bacterial and at her last hospitalization in October we rechecked her gammaglobulins which were low and opted to start IV IgG monthly to see if this would keep her out of the hospital.    She has continued on monthly IVIG since from October through March .  The patient  happily reports she has had almost no infection since starting IVIG.      She did however had bronchitis 2 weeks ago and went to the [facility] and they gave her 50 of prednisone for 5 days and doxycycline which she is just finishing this is because her white count to go up to 32,000 but otherwise her blood counts are stable and her sputum which is light green and improving     she reports feeling quite well with walking and doing exercise classes including yoga.  Her body aches are improving as is her energy level.  .  Patient has been a difficult venous stick and is questioning about a port today.  We discussed this could be an option we could discuss further . She states they have been able to obtain an IV recently however that may be needed in the future.  She denies any recent fevers or infections.    No fever sweats or weight loss    Past Medical History:   Diagnosis Date   • Aortic calcification (HCC)     mild, on echo 12/17/2017   • Aortic regurgitation     Trace   • Asthma    • Atrial fibrillation (HCC)    • CAD (coronary artery disease)    • Chronic combined systolic and diastolic congestive heart failure (HCC)    • CKD (chronic kidney disease) stage 3, GFR 30-59 ml/min ([facility])    • Coronary artery disease involving native coronary artery of native heart with angina pectoris  (HCC)    • Disc degeneration, lumbar    • DM type 2 (diabetes mellitus, type 2) (HCC)    • GERD (gastroesophageal reflux disease)    • History of aneurysm     right femoral artery s/p LHC   • History of blood transfusion    • History of fracture    • History of vitamin D deficiency    • Hyperlipidemia    • Hypertension    • Mild mitral regurgitation    • Mitral annular calcification     12/8/2017- echo, moderate   • PAF (paroxysmal atrial fibrillation) (HCC)    • Peripheral neuropathy    • Skin cancer     Left hand   • Sleep apnea     bipap   • SSS (sick sinus syndrome) (HCC)    • Stroke (cerebrum) (HCC)    • Tricuspid regurgitation     Trace       ONCOLOGIC HISTORY:    [facility] HISTORY  Patient is an 86-year-old female whom I had seen in 2014 when she was hospitalized at [facility] and was diagnosed with chronic lymphocytic leukemia.  She has not been to see me in over 4 years and is following with Dr. Suarez her primary care doctor and her white count is gone up a little higher than usual to 22,000 and she is referred back to us for evaluation.  We are doing a telephone visit because of the coronavirus pandemic and her age and susceptibility.    When I originally saw her in 2014 she was brought into the ER unresponsive in septic shock on 03/25/2014 with methicillin-resistant staphylococcus identified in her blood cultures. The patient has been on antibiotic with improvement, but had some residual kidney damage with a creatinine in the 4-range requiring hemodialysis, and was noted on admission to have an elevated white count with lymphocytosis, normal hemoglobin, but a platelet count of 95,000, and over the course of the illness her platelet count normalized and the white count had gone up into the 20,000 range with lymphocytosis. A flow cytometry was sent which is consistent with CLL, she came to our office once or twice and then stopped coming because she was so stable     She tells me now she was hospitalized recently  with rhinovirus infection and has had diagnosed with asthma and is having a lot of respiratory issues but does not require oxygen.She is at the [facility] home in independent living and managing fairly well    She denies fever sweats or weight loss.  Her last white count was on 5/26/2020  Showed a white count of 18,000 with 66 lymphs and 27 neutrophils platelet white hemoglobin is 12.6 platelet 188,000    I reassured Christopher that no treatment is indicated for this level of leukocytosis from CLL and if she has no systemic complaints I do not feel strongly about doing CAT scans at her age but I would like to physically examine her in the office in a couple of months to make sure there is no obvious adenopathy or hepatosplenomegaly.    She does have recurrent infections and we can check quantitative immunoglobulins to see how low they are as another adjunctive option to prevent recurrent infections if she has hypogammaglobulinemia    She remains on Coumadin for atrial fibrillation      Current Outpatient Medications on File Prior to Visit   Medication Sig Dispense Refill   • acetaminophen (TYLENOL) 325 MG tablet Take 2 tablets by mouth Every 4 (Four) Hours As Needed for Mild Pain .     • acetylcysteine (MUCOMYST) 20 % nebulizer solution Take 2 mL by nebulization 4 (Four) Times a Day.     • albuterol (PROVENTIL) (2.5 MG/3ML) 0.083% nebulizer solution Take 2.5 mg by nebulization Every 4 (Four) Hours.     • albuterol sulfate  (90 Base) MCG/ACT inhaler Inhale 2 puffs Every 4 (Four) Hours As Needed for Wheezing. 1 inhaler 3   • AMOXICILLIN PO Take  by mouth. DENTAL PROCEDURES/CLEANINGS     • Benralizumab (FASENRA SC) Inject  under the skin into the appropriate area as directed. Gets one shot a month, next shot may 13 then one every 8 weeks     • Calcium Carbonate-Vitamin D (calcium-vitamin D) 500-200 MG-UNIT tablet per tablet Take 1 tablet by mouth.     • carvedilol (COREG) 3.125 MG tablet TAKE ONE TABLET BY MOUTH TWICE A   tablet 2   • cephalexin (KEFLEX) 500 MG capsule Take 500 mg by mouth 3 (Three) Times a Day.     • Cetirizine HCl 10 MG capsule Take  by mouth.     • Diclofenac Sodium (VOLTAREN) 1 % gel gel Apply 4 g topically to the appropriate area as directed 2 (Two) Times a Day. 100 g 0   • fluticasone (FLONASE) 50 MCG/ACT nasal spray 1 spray into the nostril(s) as directed by provider Daily.     • Fluticasone-Umeclidin-Vilant (TRELEGY) 100-62.5-25 MCG/INH inhaler Inhale 1 puff Daily. As directed     • furosemide (LASIX) 20 MG tablet Take 1 tablet by mouth As Needed (For weight gain of greater than 2 pounds in 1 day or 5 pounds in 1 week). 30 tablet 11   • glipizide (GLUCOTROL) 5 MG tablet Take 5 mg by mouth. Take one half tablet by mouth daily     • glipizide (GLUCOTROL) 5 MG tablet Take 1 tablet by mouth 2 (Two) Times a Day Before Meals.     • guaiFENesin (MUCINEX) 600 MG 12 hr tablet Take 1 tablet by mouth Every 12 (Twelve) Hours.     • ketoconazole (NIZORAL) 2 % cream Apply 1 application topically to the appropriate area as directed 2 (Two) Times a Day.     • Loperamide HCl (IMODIUM PO) Take  by mouth Daily.     • LORazepam (ATIVAN) 0.5 MG tablet      • Melatonin 5 MG capsule Take  by mouth.     • melatonin 5 MG tablet tablet Take 5 mg by mouth Every Night.     • metFORMIN ER (GLUCOPHAGE-XR) 500 MG 24 hr tablet      • montelukast (SINGULAIR) 10 MG tablet Take 10 mg by mouth Every Night.     • mupirocin (BACTROBAN) 2 % ointment      • nystatin (MYCOSTATIN) 224341 UNIT/GM cream      • OXYBUTYNIN CHLORIDE PO Take 20 mg by mouth Daily.     • oxybutynin XL (DITROPAN-XL) 10 MG 24 hr tablet Take 10 mg by mouth every night at bedtime.     • polyethyl glycol-propyl glycol (SYSTANE) 0.4-0.3 % solution ophthalmic solution (artificial tears) Administer 1 drop to both eyes Every 1 (One) Hour As Needed.     • rosuvastatin (CRESTOR) 20 MG tablet Take 20 mg by mouth Every Night.     • Tobramycin 300 MG/4ML nebulizer solution      •  warfarin (COUMADIN) 4 MG tablet TAKE 1/2 TABLET (2mg) on Tuesday AND Friday and TAKE ONE TABLET (4mg) ALL OTHER DAYS OR AS DIRECTED. 85 tablet 1     No current facility-administered medications on file prior to visit.       ALLERGIES:     Allergies   Allergen Reactions   • Accupril [Quinapril Hcl] Swelling, Other (See Comments), GI Intolerance and Delirium     HA, constipation    • Ahist [Chlorpheniramine] Nausea Only, Other (See Comments) and Dizziness     Headache, Blurred vision   • Clarithromycin Nausea Only, Other (See Comments) and Mental Status Change     HA, Depression, Flushing   • Esomeprazole GI Intolerance   • Levalbuterol Swelling   • Levocetirizine Diarrhea and GI Intolerance   • Lipitor [Atorvastatin] Other (See Comments) and Myalgia     Dark urine   • Omeprazole Nausea Only and Other (See Comments)     HA   • Pravachol [Pravastatin] Nausea Only and GI Intolerance     Bloated, Constipation, HA   • Sulindac Other (See Comments) and Myalgia     HA, joint pain, bruising   • Valdecoxib Irritability   • Chlorcyclizine Unknown - Low Severity   • Diclofenac Sodium Unknown - Low Severity   • Diphenhydramine Unknown - Low Severity   • Lodine [Etodolac] Unknown - Low Severity   • Sulfa Antibiotics Unknown - Low Severity       Social History     Socioeconomic History   • Marital status: Single   Tobacco Use   • Smoking status: Never   • Smokeless tobacco: Never   • Tobacco comments:     caffeine use- soda   Vaping Use   • Vaping Use: Never used   Substance and Sexual Activity   • Alcohol use: Never   • Drug use: No   • Sexual activity: Defer         Cancer-related family history includes Colon cancer in her sister.     I have reviewed the patient's medical history in detail and updated the computerized patient record.    Review of Systems   Constitutional: Negative for appetite change, chills, diaphoresis, fatigue, fever and unexpected weight change.   HENT: Negative for mouth sores and sore throat.    Eyes:  Negative for visual disturbance.   Respiratory: Positive for shortness of breath (stable). Negative for cough and wheezing.    Gastrointestinal: Negative.  Negative for abdominal pain, diarrhea, nausea and vomiting.   Genitourinary: Negative.  Negative for dysuria and frequency.   Musculoskeletal: Positive for back pain.   Neurological: Negative.  Negative for dizziness and weakness.   Hematological: Does not bruise/bleed easily.   Psychiatric/Behavioral: Negative.  The patient is not nervous/anxious.    All other systems reviewed and are negative.  I have reviewed and confirmed the accuracy of the ROS as documented by the MA/LPN/RN Lucien Larkin MD      Objective      Vitals:    12/28/22 0910   BP: 94/64   Pulse: 83   Resp: 18   Temp: 96.9 °F (36.1 °C)   SpO2: 95%     Current Status 12/28/2022   ECOG score 2     Pain Score    12/28/22 0910   PainSc: 0-No pain       CONSTITUTIONAL:  Vital signs reviewed.  No distress, looks comfortable.  EYES:  Conjunctiva and lids unremarkable.  PERRLA  EARS,NOSE,MOUTH,THROAT: Hearing intact.  Lips, teeth, gums appear unremarkable.  RESPIRATORY:  Normal respiratory effort.  Lungs clear to auscultation on the right decreased breath breath sounds lower half of the left lung  CARDIOVASCULAR:  Normal S1, S2.  No murmurs rubs or gallops.  No significant lower extremity edema.  GASTROINTESTINAL: Abdomen appears unremarkable.  Nontender.  No hepatomegaly.  No splenomegaly.  LYMPHATIC:  No cervical, supraclavicular lymphadenopathy.  SKIN:  Warm.  No rashes.  Numerous ecchymosis on her arms and legs  PSYCHIATRIC:  Normal judgment and insight.  Normal mood and affect.   I have reexamined the patient 12/28/2022 and the results are consistent with the previously documented exam. Lucien Larkin MD       RECENT LABS:  Results from last 7 days   Lab Units 12/28/22  0900   WBC 10*3/mm3 34.26*   NEUTROS ABS 10*3/mm3 5.77   HEMOGLOBIN g/dL 13.6   HEMATOCRIT % 41.3   PLATELETS 10*3/mm3  153       IgG 9084, IgM 30, IgA 15.             Assessment & Plan    1. Chronic lymphocytic leukemia  -stage 0 since 2014 untreated  White count increased to 32,000 in 12/22 after prednisone pack    2.  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  The patient received IVIG treatments October 2020 through March 2021.  We have held IVIG since that time and the patient is happy to report no infections aside from some urinary tract infection which she has chronically.  Otherwise she has been feeling very well.  · Resume IV IgG from October through March 2022 monthly  · Resume IVIG from October through March 2023 monthly      3.  COPD/asthma /emphysema on home O2-recurrent respiratory infections with RSV and rhinovirus and bacteria    4.  Atrial fibrillation on Coumadin    5.  Hypertension/hypercholesterolemia/type 2 diabetes on treatment    6.  Vaccinated against coronavirus    7.  Mild anemia-continued to observe for now    Plan  1.  Proceed with IVIG today and continue monthly  2. See me in 3 mo       Lucien Larkin MD         intact

## 2023-07-19 NOTE — PROGRESS NOTE ADULT - ASSESSMENT
Conjuntivae and eyelids appear normal, Sclerae : White without injection 78 years old female known to have HTN, DLD, CAD s/p 2 cardiac stents on Plavix and ASA, presents to ED for vertigo and ataxia x 1 day.  As Per pt, yesterday morning when she tried getting out of the bed in the morning, she suddenly started experiencing sensation of everything spinning around.    CTH did not show any acute pathology, EKG NSR and she was seen by neurology and admitted for vertigo and ataxia.    # Vertigo, ataxia  - May need ENT f/u outpatient  - Neuro consult appreciated. Continuing with meclizine 25mg TID and increasing Atorvastatin to 40mg QD  - Carotid duplex showed >80% stenosis in LICA and 20-39% stenosis in HANANE  - Vascular consult appreciated. Planning OR sometime this week - pending neuro and cardio clearance  - Continuing secondary stroke prevention with  mg QD and Plavix   - MRI brain: no acute infarcts or intracranial hemorrhage  - Vitamin B12/folate wnl  - CTH neg for acute pathology, no focal weakness  - TSH low (0.01), T4 normal (6.5). Outpatient Endo?  - MRA neck significant for moderate stenosis (60-69%) of proximal LICA + calcified atheromatous plaque in HANANE w/o significant stenosis.  - Echo unremarkable, EF 60-65%  - Refused PT  - Fall precautions  - No evidence of CVA and Vit B12 def, pt would likely need outpt ENT eval and vestibular rehab or REEG to rule out seizure  - Discontinuing telemetry    # HTN  - c/w lisinopril 20mg QD  - c/w metoprolol 25mg BID    # CAD s/p 2 stents  - c/w Aspirin 162mg QD  - c/w Metoprolol 25mg BID  - c/w Plavix 75mg QD  - c/w Atorvastatin 40mg QD     # DLD  - c/w Atorvastatin 40mg QD    DVT PPx: Lovenox  GI PPx: not indicated  Diet: dash  Activity: fall precautions

## 2023-12-17 NOTE — DISCHARGE NOTE PROVIDER - PROVIDER TOKENS
Problem: Adult Inpatient Plan of Care  Goal: Plan of Care Review  Outcome: Ongoing, Progressing  Goal: Patient-Specific Goal (Individualized)  Outcome: Ongoing, Progressing  Goal: Absence of Hospital-Acquired Illness or Injury  Outcome: Ongoing, Progressing  Intervention: Identify and Manage Fall Risk  Flowsheets (Taken 12/16/2023 1900)  Safety Promotion/Fall Prevention:   assistive device/personal item within reach   bed alarm refused   toileting scheduled   nonskid shoes/socks when out of bed  Intervention: Prevent Skin Injury  Flowsheets (Taken 12/16/2023 1900)  Body Position: position changed independently  Skin Protection:   adhesive use limited   skin-to-device areas padded   skin-to-skin areas padded   transparent dressing maintained   tubing/devices free from skin contact  Intervention: Prevent and Manage VTE (Venous Thromboembolism) Risk  Flowsheets (Taken 12/16/2023 1900)  Activity Management:   Ambulated -L4   Ambulated to bathroom - L4   Ambulated in room - L4   Sitting at edge of bed - L2   Standing - L3   Step forward and back - L3   Walking in place - L3  VTE Prevention/Management:   bleeding precations maintained   bleeding risk assessed   bleeding risk factor(s) identified, provider notified   ambulation promoted   dorsiflexion/plantar flexion performed   ROM (active) performed   ROM (passive) performed  Range of Motion:   active ROM (range of motion) encouraged   ROM (range of motion) performed  Intervention: Prevent Infection  Flowsheets (Taken 12/16/2023 1900)  Infection Prevention:   rest/sleep promoted   single patient room provided   equipment surfaces disinfected   hand hygiene promoted   personal protective equipment utilized  Goal: Optimal Comfort and Wellbeing  Outcome: Ongoing, Progressing  Goal: Readiness for Transition of Care  Outcome: Ongoing, Progressing     Problem: Adjustment to Illness (Gastrointestinal Bleeding)  Goal: Optimal Coping with Acute Illness  Outcome: Ongoing,  Progressing  Intervention: Optimize Psychosocial Response  Flowsheets (Taken 12/16/2023 1900)  Supportive Measures:   relaxation techniques promoted   self-care encouraged   self-reflection promoted   self-responsibility promoted   positive reinforcement provided   verbalization of feelings encouraged     Problem: Bleeding (Gastrointestinal Bleeding)  Goal: Hemostasis  Outcome: Ongoing, Progressing  Intervention: Manage Gastrointestinal Bleeding  Flowsheets (Taken 12/16/2023 1900)  Bleeding Management:   dressing monitored   packing maintained  Environmental Support:   calm environment promoted   personal routine supported   rest periods encouraged      PROVIDER:[TOKEN:[[id number]],FOLLOWUP:[1-3 days]] PROVIDER:[TOKEN:[[id number]],FOLLOWUP:[1-3 days]],PROVIDER:[TOKEN:[[id number]],FOLLOWUP:[1-3 days]] PROVIDER:[TOKEN:[[id number]],FOLLOWUP:[1-3 days]],PROVIDER:[TOKEN:[[id number]],FOLLOWUP:[1-3 days]],PROVIDER:[TOKEN:[[id number]],FOLLOWUP:[1-3 days]],PROVIDER:[TOKEN:[[id number]],FOLLOWUP:[1-3 days]]

## 2024-03-15 ENCOUNTER — APPOINTMENT (OUTPATIENT)
Dept: NEUROLOGY | Facility: CLINIC | Age: 84
End: 2024-03-15
Payer: MEDICARE

## 2024-03-15 VITALS
OXYGEN SATURATION: 97 % | WEIGHT: 188 LBS | HEART RATE: 87 BPM | BODY MASS INDEX: 33.31 KG/M2 | RESPIRATION RATE: 16 BRPM | HEIGHT: 63 IN | TEMPERATURE: 97.5 F | SYSTOLIC BLOOD PRESSURE: 141 MMHG | DIASTOLIC BLOOD PRESSURE: 82 MMHG

## 2024-03-15 DIAGNOSIS — I67.1 CEREBRAL ANEURYSM, NONRUPTURED: ICD-10-CM

## 2024-03-15 PROCEDURE — 99204 OFFICE O/P NEW MOD 45 MIN: CPT

## 2024-03-15 PROCEDURE — G2211 COMPLEX E/M VISIT ADD ON: CPT

## 2024-03-15 RX ORDER — CLOPIDOGREL BISULFATE 300 MG/1
TABLET, FILM COATED ORAL
Refills: 0 | Status: DISCONTINUED | COMMUNITY
End: 2024-03-15

## 2024-03-15 RX ORDER — METOPROLOL TARTRATE 75 MG/1
TABLET, FILM COATED ORAL
Refills: 0 | Status: DISCONTINUED | COMMUNITY
End: 2024-03-15

## 2024-03-15 RX ORDER — METOPROLOL TARTRATE 50 MG/1
50 TABLET, FILM COATED ORAL
Refills: 0 | Status: ACTIVE | COMMUNITY

## 2024-03-15 RX ORDER — LOSARTAN POTASSIUM 100 MG/1
100 TABLET, FILM COATED ORAL
Refills: 0 | Status: ACTIVE | COMMUNITY

## 2024-03-15 RX ORDER — LISINOPRIL 30 MG/1
TABLET ORAL
Refills: 0 | Status: DISCONTINUED | COMMUNITY
End: 2024-03-15

## 2024-03-20 PROBLEM — I67.1 INTRACRANIAL ANEURYSM: Status: ACTIVE | Noted: 2022-11-01

## 2024-03-26 NOTE — PHYSICAL EXAM
[FreeTextEntry1] : AAOx3, NAD CN 2-12 normal No aphasia or dysarthria Strength 5/5 throughout Normal sensation Normal bulk/tone Narrow based gait

## 2024-03-26 NOTE — HISTORY OF PRESENT ILLNESS
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  She previously had a CTA H done on ED visit on 9/26/22 for c/o of R sided HA and hypertension. MRA H done 11/14/22 reported stable 4mm saccular aneurysm arising from supraclinoid segment of R ICA.  Patient's risk factors include HTN for over 40 years and active smoking for 60+ years  She was last seen in clinic in 2022. She has not had any follow-up imaging since that time. She otherwise has no complaints.

## 2024-05-23 ENCOUNTER — APPOINTMENT (OUTPATIENT)
Dept: OTOLARYNGOLOGY | Facility: CLINIC | Age: 84
End: 2024-05-23

## 2024-08-01 ENCOUNTER — APPOINTMENT (OUTPATIENT)
Dept: OTOLARYNGOLOGY | Facility: CLINIC | Age: 84
End: 2024-08-01
Payer: MEDICARE

## 2024-08-01 VITALS — HEIGHT: 63 IN | BODY MASS INDEX: 33.31 KG/M2 | WEIGHT: 188 LBS

## 2024-08-01 DIAGNOSIS — R59.0 LOCALIZED ENLARGED LYMPH NODES: ICD-10-CM

## 2024-08-01 PROCEDURE — 99203 OFFICE O/P NEW LOW 30 MIN: CPT

## 2024-08-01 NOTE — PHYSICAL EXAM
[de-identified] : salivary gland tenderness  [Normal] : mucosa is normal [Midline] : trachea located in midline position [de-identified] : mandibular torus

## 2024-08-01 NOTE — HISTORY OF PRESENT ILLNESS
[de-identified] : Patient presents today c/o lump under jaw , possible swollen lymph node.  Patient states she noticed it in December .  It is just a little sensitive to touch . Pt had biopsy of neck by Dr. Tom a few weeks ago. Pt accompanied by daughter.

## 2024-08-30 ENCOUNTER — OUTPATIENT (OUTPATIENT)
Dept: OUTPATIENT SERVICES | Facility: HOSPITAL | Age: 84
LOS: 1 days | End: 2024-08-30
Payer: MEDICARE

## 2024-08-30 ENCOUNTER — RESULT REVIEW (OUTPATIENT)
Age: 84
End: 2024-08-30

## 2024-08-30 DIAGNOSIS — R59.0 LOCALIZED ENLARGED LYMPH NODES: ICD-10-CM

## 2024-08-30 DIAGNOSIS — Z98.890 OTHER SPECIFIED POSTPROCEDURAL STATES: Chronic | ICD-10-CM

## 2024-08-30 DIAGNOSIS — Z90.49 ACQUIRED ABSENCE OF OTHER SPECIFIED PARTS OF DIGESTIVE TRACT: Chronic | ICD-10-CM

## 2024-08-30 DIAGNOSIS — Z00.8 ENCOUNTER FOR OTHER GENERAL EXAMINATION: ICD-10-CM

## 2024-08-30 DIAGNOSIS — Z90.89 ACQUIRED ABSENCE OF OTHER ORGANS: Chronic | ICD-10-CM

## 2024-08-30 PROCEDURE — 76536 US EXAM OF HEAD AND NECK: CPT

## 2024-08-30 PROCEDURE — 76536 US EXAM OF HEAD AND NECK: CPT | Mod: 26

## 2024-08-31 DIAGNOSIS — R59.0 LOCALIZED ENLARGED LYMPH NODES: ICD-10-CM

## 2025-03-20 ENCOUNTER — APPOINTMENT (OUTPATIENT)
Dept: ORTHOPEDIC SURGERY | Facility: CLINIC | Age: 85
End: 2025-03-20

## 2025-03-25 ENCOUNTER — APPOINTMENT (OUTPATIENT)
Dept: OTOLARYNGOLOGY | Facility: CLINIC | Age: 85
End: 2025-03-25
Payer: MEDICARE

## 2025-03-25 DIAGNOSIS — R22.1 LOCALIZED SWELLING, MASS AND LUMP, NECK: ICD-10-CM

## 2025-03-25 DIAGNOSIS — R59.0 LOCALIZED ENLARGED LYMPH NODES: ICD-10-CM

## 2025-03-25 PROCEDURE — 31575 DIAGNOSTIC LARYNGOSCOPY: CPT

## 2025-03-25 PROCEDURE — 99214 OFFICE O/P EST MOD 30 MIN: CPT | Mod: 25

## 2025-04-02 ENCOUNTER — NON-APPOINTMENT (OUTPATIENT)
Age: 85
End: 2025-04-02

## 2025-04-02 ENCOUNTER — APPOINTMENT (OUTPATIENT)
Dept: NEUROLOGY | Facility: CLINIC | Age: 85
End: 2025-04-02

## 2025-04-02 VITALS
BODY MASS INDEX: 32.25 KG/M2 | OXYGEN SATURATION: 99 % | HEART RATE: 62 BPM | TEMPERATURE: 98.6 F | DIASTOLIC BLOOD PRESSURE: 73 MMHG | WEIGHT: 182 LBS | SYSTOLIC BLOOD PRESSURE: 130 MMHG | HEIGHT: 63 IN

## 2025-04-02 PROCEDURE — 99214 OFFICE O/P EST MOD 30 MIN: CPT

## 2025-04-02 PROCEDURE — G2211 COMPLEX E/M VISIT ADD ON: CPT

## 2025-04-02 RX ORDER — ATORVASTATIN CALCIUM 40 MG/1
40 TABLET, FILM COATED ORAL DAILY
Refills: 0 | Status: ACTIVE | COMMUNITY

## 2025-04-08 ENCOUNTER — APPOINTMENT (OUTPATIENT)
Dept: OTOLARYNGOLOGY | Facility: CLINIC | Age: 85
End: 2025-04-08
Payer: MEDICARE

## 2025-04-08 DIAGNOSIS — E04.1 NONTOXIC SINGLE THYROID NODULE: ICD-10-CM

## 2025-04-08 DIAGNOSIS — K11.20 SIALOADENITIS, UNSPECIFIED: ICD-10-CM

## 2025-04-08 DIAGNOSIS — I65.29 OCCLUSION AND STENOSIS OF UNSPECIFIED CAROTID ARTERY: ICD-10-CM

## 2025-04-08 DIAGNOSIS — I67.1 CEREBRAL ANEURYSM, NONRUPTURED: ICD-10-CM

## 2025-04-08 DIAGNOSIS — R22.1 LOCALIZED SWELLING, MASS AND LUMP, NECK: ICD-10-CM

## 2025-04-08 PROCEDURE — 99214 OFFICE O/P EST MOD 30 MIN: CPT | Mod: 25

## 2025-04-08 PROCEDURE — 31575 DIAGNOSTIC LARYNGOSCOPY: CPT

## 2025-04-08 RX ORDER — GABAPENTIN 100 MG/1
100 CAPSULE ORAL
Qty: 30 | Refills: 0 | Status: ACTIVE | COMMUNITY
Start: 2025-04-08 | End: 1900-01-01

## 2025-04-08 RX ORDER — OXYCODONE AND ACETAMINOPHEN 2.5; 325 MG/1; MG/1
2.5-325 TABLET ORAL EVERY 6 HOURS
Qty: 8 | Refills: 0 | Status: ACTIVE | COMMUNITY
Start: 2025-04-08 | End: 1900-01-01

## 2025-04-08 RX ORDER — METHYLPREDNISOLONE 4 MG/1
4 TABLET ORAL
Qty: 1 | Refills: 0 | Status: ACTIVE | COMMUNITY
Start: 2025-04-08 | End: 1900-01-01

## 2025-04-09 RX ORDER — OXYCODONE AND ACETAMINOPHEN 5; 325 MG/1; MG/1
5-325 TABLET ORAL
Qty: 6 | Refills: 0 | Status: ACTIVE | COMMUNITY
Start: 2025-04-09 | End: 1900-01-01

## 2025-04-15 ENCOUNTER — EMERGENCY (EMERGENCY)
Facility: HOSPITAL | Age: 85
LOS: 0 days | Discharge: HOME | End: 2025-04-15
Attending: EMERGENCY MEDICINE
Payer: MEDICARE

## 2025-04-15 VITALS
OXYGEN SATURATION: 100 % | RESPIRATION RATE: 19 BRPM | DIASTOLIC BLOOD PRESSURE: 78 MMHG | WEIGHT: 182.1 LBS | TEMPERATURE: 97 F | HEIGHT: 63 IN | HEART RATE: 71 BPM | SYSTOLIC BLOOD PRESSURE: 142 MMHG

## 2025-04-15 VITALS
RESPIRATION RATE: 18 BRPM | SYSTOLIC BLOOD PRESSURE: 139 MMHG | DIASTOLIC BLOOD PRESSURE: 84 MMHG | TEMPERATURE: 98 F | HEART RATE: 67 BPM | OXYGEN SATURATION: 96 %

## 2025-04-15 DIAGNOSIS — Z98.890 OTHER SPECIFIED POSTPROCEDURAL STATES: Chronic | ICD-10-CM

## 2025-04-15 DIAGNOSIS — F17.210 NICOTINE DEPENDENCE, CIGARETTES, UNCOMPLICATED: ICD-10-CM

## 2025-04-15 DIAGNOSIS — E78.5 HYPERLIPIDEMIA, UNSPECIFIED: ICD-10-CM

## 2025-04-15 DIAGNOSIS — Z95.5 PRESENCE OF CORONARY ANGIOPLASTY IMPLANT AND GRAFT: ICD-10-CM

## 2025-04-15 DIAGNOSIS — R07.2 PRECORDIAL PAIN: ICD-10-CM

## 2025-04-15 DIAGNOSIS — I10 ESSENTIAL (PRIMARY) HYPERTENSION: ICD-10-CM

## 2025-04-15 DIAGNOSIS — I25.10 ATHEROSCLEROTIC HEART DISEASE OF NATIVE CORONARY ARTERY WITHOUT ANGINA PECTORIS: ICD-10-CM

## 2025-04-15 DIAGNOSIS — K20.80 OTHER ESOPHAGITIS WITHOUT BLEEDING: ICD-10-CM

## 2025-04-15 DIAGNOSIS — Z90.49 ACQUIRED ABSENCE OF OTHER SPECIFIED PARTS OF DIGESTIVE TRACT: Chronic | ICD-10-CM

## 2025-04-15 DIAGNOSIS — Z90.89 ACQUIRED ABSENCE OF OTHER ORGANS: Chronic | ICD-10-CM

## 2025-04-15 LAB
ALBUMIN SERPL ELPH-MCNC: 3.8 G/DL — SIGNIFICANT CHANGE UP (ref 3.5–5.2)
ALP SERPL-CCNC: 142 U/L — HIGH (ref 30–115)
ALT FLD-CCNC: 11 U/L — SIGNIFICANT CHANGE UP (ref 0–41)
ANION GAP SERPL CALC-SCNC: 11 MMOL/L — SIGNIFICANT CHANGE UP (ref 7–14)
AST SERPL-CCNC: 22 U/L — SIGNIFICANT CHANGE UP (ref 0–41)
BASOPHILS # BLD AUTO: 0.04 K/UL — SIGNIFICANT CHANGE UP (ref 0–0.2)
BASOPHILS NFR BLD AUTO: 0.3 % — SIGNIFICANT CHANGE UP (ref 0–1)
BILIRUB SERPL-MCNC: <0.2 MG/DL — SIGNIFICANT CHANGE UP (ref 0.2–1.2)
BUN SERPL-MCNC: 15 MG/DL — SIGNIFICANT CHANGE UP (ref 10–20)
CALCIUM SERPL-MCNC: 9.2 MG/DL — SIGNIFICANT CHANGE UP (ref 8.4–10.5)
CHLORIDE SERPL-SCNC: 105 MMOL/L — SIGNIFICANT CHANGE UP (ref 98–110)
CO2 SERPL-SCNC: 24 MMOL/L — SIGNIFICANT CHANGE UP (ref 17–32)
CREAT SERPL-MCNC: 0.8 MG/DL — SIGNIFICANT CHANGE UP (ref 0.7–1.5)
EGFR: 73 ML/MIN/1.73M2 — SIGNIFICANT CHANGE UP
EGFR: 73 ML/MIN/1.73M2 — SIGNIFICANT CHANGE UP
EOSINOPHIL # BLD AUTO: 0.39 K/UL — SIGNIFICANT CHANGE UP (ref 0–0.7)
EOSINOPHIL NFR BLD AUTO: 3.4 % — SIGNIFICANT CHANGE UP (ref 0–8)
GLUCOSE SERPL-MCNC: 90 MG/DL — SIGNIFICANT CHANGE UP (ref 70–99)
HCT VFR BLD CALC: 36.8 % — LOW (ref 37–47)
HGB BLD-MCNC: 12.3 G/DL — SIGNIFICANT CHANGE UP (ref 12–16)
IMM GRANULOCYTES NFR BLD AUTO: 0.3 % — SIGNIFICANT CHANGE UP (ref 0.1–0.3)
LYMPHOCYTES # BLD AUTO: 28.4 % — SIGNIFICANT CHANGE UP (ref 20.5–51.1)
LYMPHOCYTES # BLD AUTO: 3.3 K/UL — SIGNIFICANT CHANGE UP (ref 1.2–3.4)
MAGNESIUM SERPL-MCNC: 2.1 MG/DL — SIGNIFICANT CHANGE UP (ref 1.8–2.4)
MCHC RBC-ENTMCNC: 31.4 PG — HIGH (ref 27–31)
MCHC RBC-ENTMCNC: 33.4 G/DL — SIGNIFICANT CHANGE UP (ref 32–37)
MCV RBC AUTO: 93.9 FL — SIGNIFICANT CHANGE UP (ref 81–99)
MONOCYTES # BLD AUTO: 1.33 K/UL — HIGH (ref 0.1–0.6)
MONOCYTES NFR BLD AUTO: 11.4 % — HIGH (ref 1.7–9.3)
NEUTROPHILS # BLD AUTO: 6.53 K/UL — HIGH (ref 1.4–6.5)
NEUTROPHILS NFR BLD AUTO: 56.2 % — SIGNIFICANT CHANGE UP (ref 42.2–75.2)
NRBC BLD AUTO-RTO: 0 /100 WBCS — SIGNIFICANT CHANGE UP (ref 0–0)
NT-PROBNP SERPL-SCNC: 216 PG/ML — SIGNIFICANT CHANGE UP (ref 0–300)
PLATELET # BLD AUTO: 254 K/UL — SIGNIFICANT CHANGE UP (ref 130–400)
PMV BLD: 11.3 FL — HIGH (ref 7.4–10.4)
POTASSIUM SERPL-MCNC: 4.7 MMOL/L — SIGNIFICANT CHANGE UP (ref 3.5–5)
POTASSIUM SERPL-SCNC: 4.7 MMOL/L — SIGNIFICANT CHANGE UP (ref 3.5–5)
PROT SERPL-MCNC: 7.4 G/DL — SIGNIFICANT CHANGE UP (ref 6–8)
RBC # BLD: 3.92 M/UL — LOW (ref 4.2–5.4)
RBC # FLD: 14.5 % — SIGNIFICANT CHANGE UP (ref 11.5–14.5)
SODIUM SERPL-SCNC: 140 MMOL/L — SIGNIFICANT CHANGE UP (ref 135–146)
TROPONIN T, HIGH SENSITIVITY RESULT: 7 NG/L — SIGNIFICANT CHANGE UP (ref 6–13)
TROPONIN T, HIGH SENSITIVITY RESULT: 8 NG/L — SIGNIFICANT CHANGE UP (ref 6–13)
WBC # BLD: 11.63 K/UL — HIGH (ref 4.8–10.8)
WBC # FLD AUTO: 11.63 K/UL — HIGH (ref 4.8–10.8)

## 2025-04-15 PROCEDURE — 80053 COMPREHEN METABOLIC PANEL: CPT

## 2025-04-15 PROCEDURE — 83880 ASSAY OF NATRIURETIC PEPTIDE: CPT

## 2025-04-15 PROCEDURE — 96374 THER/PROPH/DIAG INJ IV PUSH: CPT

## 2025-04-15 PROCEDURE — 83735 ASSAY OF MAGNESIUM: CPT

## 2025-04-15 PROCEDURE — G0378: CPT

## 2025-04-15 PROCEDURE — 99285 EMERGENCY DEPT VISIT HI MDM: CPT | Mod: 25

## 2025-04-15 PROCEDURE — 93005 ELECTROCARDIOGRAM TRACING: CPT

## 2025-04-15 PROCEDURE — 99236 HOSP IP/OBS SAME DATE HI 85: CPT | Mod: 25

## 2025-04-15 PROCEDURE — 84484 ASSAY OF TROPONIN QUANT: CPT

## 2025-04-15 PROCEDURE — 93010 ELECTROCARDIOGRAM REPORT: CPT

## 2025-04-15 PROCEDURE — 71045 X-RAY EXAM CHEST 1 VIEW: CPT

## 2025-04-15 PROCEDURE — 85025 COMPLETE CBC W/AUTO DIFF WBC: CPT

## 2025-04-15 PROCEDURE — 36415 COLL VENOUS BLD VENIPUNCTURE: CPT

## 2025-04-15 PROCEDURE — 71045 X-RAY EXAM CHEST 1 VIEW: CPT | Mod: 26

## 2025-04-15 RX ORDER — DIPHENHYDRAMINE HYDROCHLORIDE AND LIDOCAINE HYDROCHLORIDE AND ALUMINUM HYDROXIDE AND MAGNESIUM HYDRO
30 KIT ONCE
Refills: 0 | Status: COMPLETED | OUTPATIENT
Start: 2025-04-15 | End: 2025-04-15

## 2025-04-15 RX ORDER — SUCRALFATE 1 G
10 TABLET ORAL
Qty: 100 | Refills: 0
Start: 2025-04-15 | End: 2025-04-19

## 2025-04-15 RX ORDER — SUCRALFATE 1 G
1 TABLET ORAL ONCE
Refills: 0 | Status: COMPLETED | OUTPATIENT
Start: 2025-04-15 | End: 2025-04-15

## 2025-04-15 RX ORDER — MAGNESIUM, ALUMINUM HYDROXIDE 200-200 MG
30 TABLET,CHEWABLE ORAL ONCE
Refills: 0 | Status: COMPLETED | OUTPATIENT
Start: 2025-04-15 | End: 2025-04-15

## 2025-04-15 RX ORDER — REGADENOSON 0.08 MG/ML
0.4 INJECTION, SOLUTION INTRAVENOUS ONCE
Refills: 0 | Status: DISCONTINUED | OUTPATIENT
Start: 2025-04-15 | End: 2025-04-15

## 2025-04-15 RX ADMIN — DIPHENHYDRAMINE HYDROCHLORIDE AND LIDOCAINE HYDROCHLORIDE AND ALUMINUM HYDROXIDE AND MAGNESIUM HYDRO 30 MILLILITER(S): KIT at 03:57

## 2025-04-15 RX ADMIN — Medication 40 MILLIGRAM(S): at 05:51

## 2025-04-15 RX ADMIN — Medication 30 MILLILITER(S): at 03:16

## 2025-04-15 RX ADMIN — Medication 1 GRAM(S): at 04:10

## 2025-04-15 RX ADMIN — DIPHENHYDRAMINE HYDROCHLORIDE AND LIDOCAINE HYDROCHLORIDE AND ALUMINUM HYDROXIDE AND MAGNESIUM HYDRO 30 MILLILITER(S): KIT at 06:40

## 2025-04-15 NOTE — ED PROVIDER NOTE - PHYSICAL EXAMINATION
Physical Exam    Vital Signs: I have reviewed the initial vital signs.  Constitutional: well-nourished, appears stated age, no acute distress  Eyes: Conjunctiva pink, Sclera clear  ENT: (+) mild left submandibular LAD. no trimus. no tripoding. no stridor. no drooling. no muffled voice.   Cardiovascular: regular rate, regular rhythm, well-perfused extremities, radial pulses equal and 2+ b/l.   Respiratory: unlabored respiratory effort, clear to auscultation bilaterally no wheezing, rales and rhonchi. pt is speaking full sentences. no accessory muscle use. no chest wall crepitus or tenderness. no flail chest.   Gastrointestinal: soft, non-tender, nondistended abdomen, no pulsatile mass, no rebound, no guarding  Musculoskeletal: no lower extremity edema, no calf tenderness  Integumentary: warm, dry, no rash  Neurologic: awake, alert  Psychiatric: appropriate mood, appropriate affect

## 2025-04-15 NOTE — ED ADULT NURSE REASSESSMENT NOTE - NS ED NURSE REASSESS COMMENT FT1
Pt does not feel relief from Carafate and endorses increased upper epigastric pain. MARY Alanis notified, awaiting further orders.

## 2025-04-15 NOTE — ED CDU PROVIDER DISPOSITION NOTE - CARE PROVIDER_API CALL
Maximiliano Cormier  Gastroenterology  [address] [place] [address]  Phone: [phone]  Fax: [phone]  Follow Up Time: Routine    Hussain Blake  Cardiology  [address], [address]  [place] [address]  Phone: [phone]  Fax: [phone]  Established Patient  Follow Up Time: Routine

## 2025-04-15 NOTE — ED CDU PROVIDER INITIAL DAY NOTE - OBJECTIVE STATEMENT
84-year-old female with a past medical history of CAD x 2 statins followed by Dr. Loving, hypertension, and hyperlipidemia presents to the ED for evaluation of intermittent midsternal chest pain that began at 4 PM after swallowing clindamycin.  Patient reports her ENT started her on clindamycin a couple days ago due to left sided swollen lymph node.  Patient last saw her cardiologist 2 weeks ago as a routine visit.  As per patient's daughter at the bedside patient recently had a nuclear stress test around September which was normal.  Patient is a current daily cigarette smoker.  As per [facility] patient had a nuclear stress test on August 28, 2019 which showed no evidence for ischemia during adenosine infusion, normal resting left ventricular wall motion and wall thickening, and left ventricular ejection fraction of 80% which is within range of normal.  Patient also had a TTE on August 28, 2019 which showed left ventricular ejection fraction by visual estimation is 60 to 65%, normal global left ventricular systolic function, sclerotic aortic valve with normal opening, and no evidence of patent foramen ovale.Patient denies fever, chills, runny nose, sore throat, cough, shortness of breath, difficulty or pain with swallowing, abdominal pain, nausea, vomiting, diarrhea, constipation, urinary symptoms, leg pain, leg swelling, recent travel, recent trauma, recent surgeries, recent hospitalizations, history of cancer, use of hormones, recent sick contacts, or weakness.

## 2025-04-15 NOTE — ED CDU PROVIDER DISPOSITION NOTE - PATIENT PORTAL LINK FT
You can access the FollowMyHealth Patient Portal offered by [facility] by registering at the following website: [email or web address]. By joining [email or web address] [email or web address] portal, you will also be able to view your health information using other applications (apps) compatible with our system.

## 2025-04-15 NOTE — ED PROVIDER NOTE - OBJECTIVE STATEMENT
84-year-old female with a past medical history of CAD x 2 statins followed by Dr. Loving, hypertension, and hyperlipidemia presents to the ED for evaluation of intermittent midsternal chest pain that began at 4 PM after swallowing clindamycin.  Patient reports her ENT started her on clindamycin a couple days ago due to left sided swollen lymph node.  Patient last saw her cardiologist 2 weeks ago as a routine visit.  As per patient's daughter at the bedside patient recently had a nuclear stress test around September which was normal.  Patient is a current daily cigarette smoker.  As per [facility] patient had a nuclear stress test on August 28, 2019 which showed no evidence for ischemia during adenosine infusion, normal resting left ventricular wall motion and wall thickening, and left ventricular ejection fraction of 80% which is within range of normal.  Patient also had a TTE on August 28, 2019 which showed left ventricular ejection fraction by visual estimation is 60 to 65%, normal global left ventricular systolic function, sclerotic aortic valve with normal opening, and no evidence of patent foramen ovale.Patient denies fever, chills, runny nose, sore throat, cough, shortness of breath, difficulty or pain with swallowing, abdominal pain, nausea, vomiting, diarrhea, constipation, urinary symptoms, leg pain, leg swelling, recent travel, recent trauma, recent surgeries, recent hospitalizations, history of cancer, use of hormones, recent sick contacts, or weakness.

## 2025-04-15 NOTE — ED CDU PROVIDER DISPOSITION NOTE - PROVIDER TOKENS
PROVIDER:[TOKEN:[[id number]],FOLLOWUP:[Routine]],PROVIDER:[TOKEN:[[id number]],FOLLOWUP:[Routine],ESTABLISHEDPATIENT:[T]]

## 2025-04-15 NOTE — ED PROVIDER NOTE - CLINICAL SUMMARY MEDICAL DECISION MAKING FREE TEXT BOX
84-year-old female with past medical history HTN, CAD s/p 3 stents, left-sided CEA, cholecystectomy, hysterectomy, being treated for a left cervical gland swelling with clindamycin and prednisone presents to the ED for chest pain since this afternoon after swelling and clindamycin tablet.  Patient states right after swallowing the clindamycin she felt that it might of gotten stuck in her esophagus, which after several glasses of water eventually moved down.  Is now having constant discomfort to the midsternal chest area since this incident.  Patient has not taken her evening medication because of concern that something may be going on in the chest cavity however she is not regurgitating any liquids, is tolerating her secretions, and is able to swallow drinks since the incident no other complaints of nausea/vomiting/diarrhea/shortness of breath/sweats/back pain/leg swelling/abdominal pain.  Pt was given GI cocktail as strong suspicion for pill esophagitis. Labs including trop x 2 did not show acute ischemia. Despite GI treatments x several, pt continues to have intermittent CP. Pt is a [facility] cardio pt, therefore will recommend pt be placed in OBS for ACS management and consultation with her cardio later this morning.

## 2025-04-15 NOTE — ED PROVIDER NOTE - ATTENDING APP SHARED VISIT CONTRIBUTION OF CARE
84-year-old female with past medical history HTN, CAD s/p 3 stents, left-sided CEA, cholecystectomy, hysterectomy, being treated for a left cervical gland swelling with clindamycin and prednisone presents to the ED for chest pain since this afternoon after swelling and clindamycin tablet.  Patient states right after swallowing the clindamycin she felt that it might of gotten stuck in her esophagus, which after several glasses of water eventually moved down.  Is now having constant discomfort to the midsternal chest area since this incident.  Patient has not taken her evening medication because of concern that something may be going on in the chest cavity however she is not regurgitating any liquids, is tolerating her secretions, and is able to swallow drinks since the incident no other complaints of nausea/vomiting/diarrhea/shortness of breath/sweats/back pain/leg swelling/abdominal pain.    Exam AVSS, agree with PA exam management.      A/P chest pain but possibly could be related to mild esophagitis secondary to pill this afternoon.  Will check labs, EKG, x-ray and give patient Maalox (Magic mouthwash), Carafate and reassess.    ALL: nkda  PMH/Psurg: HTN, CAD s/p stents x 3, Left CEA, cholecystectomy, hysterectomy,   SH +smoker, no etoh  PMD Gessman  Meds: atorvastatin, metoprolol, losartan, amlodipine, clindamycin, prednisone, gabapentin

## 2025-04-15 NOTE — ED ADULT NURSE NOTE - NSFALLRISKINTERV_ED_ALL_ED

## 2025-04-15 NOTE — ED ADULT NURSE REASSESSMENT NOTE - NS ED NURSE REASSESS COMMENT FT1
Per provider pt can take scheduled home medications when needed, Nurse educated pt and pt's daughter to notify staff members and let assigned nurse know before taking home scheduled medication.

## 2025-04-15 NOTE — ED ADULT NURSE NOTE - OBJECTIVE STATEMENT
Pt is AOX4 with daughter at bedside. Pt endorses chest pain with raidtaion to the left hand after swallowing an antibiotic pill at 4 p.m.. Pt feels as if the pill is stuck in throat. Pt endorses dizziness while ambulating. Diziness has decreased once sitting down. MARY Alanis notified of pt needing U/S IV.

## 2025-04-16 RX ORDER — CLINDAMYCIN HYDROCHLORIDE 300 MG/1
300 CAPSULE ORAL EVERY 6 HOURS
Qty: 56 | Refills: 0 | Status: DISCONTINUED | COMMUNITY
Start: 2025-04-08 | End: 2025-04-16

## 2025-04-16 RX ORDER — CLINDAMYCIN PALMITATE HYDROCHLORIDE (PEDIATRIC) 75 MG/5ML
75 SOLUTION ORAL
Qty: 560 | Refills: 0 | Status: ACTIVE | COMMUNITY
Start: 2025-04-16 | End: 1900-01-01

## 2025-05-08 ENCOUNTER — APPOINTMENT (OUTPATIENT)
Dept: PULMONOLOGY | Facility: CLINIC | Age: 85
End: 2025-05-08
Payer: MEDICARE

## 2025-05-08 VITALS
SYSTOLIC BLOOD PRESSURE: 112 MMHG | OXYGEN SATURATION: 95 % | HEART RATE: 70 BPM | BODY MASS INDEX: 32.24 KG/M2 | WEIGHT: 182 LBS | DIASTOLIC BLOOD PRESSURE: 64 MMHG

## 2025-05-08 DIAGNOSIS — F17.210 NICOTINE DEPENDENCE, CIGARETTES, UNCOMPLICATED: ICD-10-CM

## 2025-05-08 DIAGNOSIS — J44.9 CHRONIC OBSTRUCTIVE PULMONARY DISEASE, UNSPECIFIED: ICD-10-CM

## 2025-05-08 PROCEDURE — 99204 OFFICE O/P NEW MOD 45 MIN: CPT | Mod: 25

## 2025-05-08 PROCEDURE — 99406 BEHAV CHNG SMOKING 3-10 MIN: CPT

## 2025-05-08 RX ORDER — NICOTINE POLACRILEX 2 MG/1
2 LOZENGE ORAL
Qty: 90 | Refills: 3 | Status: ACTIVE | COMMUNITY
Start: 2025-05-08 | End: 1900-01-01

## 2025-05-08 RX ORDER — ZOLPIDEM TARTRATE 5 MG/1
5 TABLET ORAL
Refills: 0 | Status: ACTIVE | COMMUNITY

## 2025-05-08 RX ORDER — VARENICLINE TARTRATE 0.5 (11)-1
0.5 MG X 11 & KIT ORAL
Qty: 1 | Refills: 0 | Status: ACTIVE | COMMUNITY
Start: 2025-05-08 | End: 1900-01-01

## 2025-05-08 RX ORDER — AMLODIPINE BESYLATE 5 MG/1
5 TABLET ORAL
Refills: 0 | Status: ACTIVE | COMMUNITY

## 2025-05-08 RX ORDER — VARENICLINE TARTRATE 1 MG/561
1 TABLET, FILM COATED ORAL
Qty: 120 | Refills: 3 | Status: ACTIVE | COMMUNITY
Start: 2025-05-08 | End: 1900-01-01

## 2025-05-21 ENCOUNTER — RESULT REVIEW (OUTPATIENT)
Age: 85
End: 2025-05-21

## 2025-05-22 ENCOUNTER — APPOINTMENT (OUTPATIENT)
Dept: PULMONOLOGY | Facility: HOSPITAL | Age: 85
End: 2025-05-22

## 2025-06-09 ENCOUNTER — APPOINTMENT (OUTPATIENT)
Dept: OTOLARYNGOLOGY | Facility: CLINIC | Age: 85
End: 2025-06-09
Payer: MEDICARE

## 2025-06-09 PROCEDURE — 99214 OFFICE O/P EST MOD 30 MIN: CPT
